# Patient Record
Sex: FEMALE | Race: WHITE | NOT HISPANIC OR LATINO | Employment: OTHER | URBAN - METROPOLITAN AREA
[De-identification: names, ages, dates, MRNs, and addresses within clinical notes are randomized per-mention and may not be internally consistent; named-entity substitution may affect disease eponyms.]

---

## 2023-06-20 ENCOUNTER — TRANSFERRED RECORDS (OUTPATIENT)
Dept: HEALTH INFORMATION MANAGEMENT | Facility: CLINIC | Age: 58
End: 2023-06-20

## 2023-06-22 ENCOUNTER — APPOINTMENT (OUTPATIENT)
Dept: GENERAL RADIOLOGY | Facility: CLINIC | Age: 58
DRG: 856 | End: 2023-06-22
Attending: EMERGENCY MEDICINE
Payer: COMMERCIAL

## 2023-06-22 ENCOUNTER — APPOINTMENT (OUTPATIENT)
Dept: CT IMAGING | Facility: CLINIC | Age: 58
DRG: 856 | End: 2023-06-22
Attending: EMERGENCY MEDICINE
Payer: COMMERCIAL

## 2023-06-22 ENCOUNTER — HOSPITAL ENCOUNTER (INPATIENT)
Facility: CLINIC | Age: 58
LOS: 9 days | Discharge: HOME | DRG: 856 | End: 2023-07-01
Attending: EMERGENCY MEDICINE | Admitting: INTERNAL MEDICINE
Payer: COMMERCIAL

## 2023-06-22 DIAGNOSIS — L97.522 FOOT ULCER, LEFT, WITH FAT LAYER EXPOSED (H): Primary | ICD-10-CM

## 2023-06-22 DIAGNOSIS — L03.211 FACIAL CELLULITIS: ICD-10-CM

## 2023-06-22 DIAGNOSIS — L03.032 CELLULITIS OF LEFT TOE: ICD-10-CM

## 2023-06-22 LAB
ALBUMIN SERPL BCG-MCNC: 4.2 G/DL (ref 3.5–5.2)
ALP SERPL-CCNC: 102 U/L (ref 35–104)
ALT SERPL W P-5'-P-CCNC: 5 U/L (ref 0–50)
ANION GAP SERPL CALCULATED.3IONS-SCNC: 15 MMOL/L (ref 7–15)
AST SERPL W P-5'-P-CCNC: 16 U/L (ref 0–45)
BASOPHILS # BLD AUTO: 0 10E3/UL (ref 0–0.2)
BASOPHILS NFR BLD AUTO: 0 %
BILIRUB SERPL-MCNC: 0.5 MG/DL
BUN SERPL-MCNC: 9 MG/DL (ref 6–20)
CALCIUM SERPL-MCNC: 9.5 MG/DL (ref 8.6–10)
CHLORIDE SERPL-SCNC: 100 MMOL/L (ref 98–107)
CREAT SERPL-MCNC: 0.53 MG/DL (ref 0.51–0.95)
DEPRECATED HCO3 PLAS-SCNC: 21 MMOL/L (ref 22–29)
EOSINOPHIL # BLD AUTO: 0 10E3/UL (ref 0–0.7)
EOSINOPHIL NFR BLD AUTO: 0 %
ERYTHROCYTE [DISTWIDTH] IN BLOOD BY AUTOMATED COUNT: 12.1 % (ref 10–15)
GFR SERPL CREATININE-BSD FRML MDRD: >90 ML/MIN/1.73M2
GLUCOSE BLDC GLUCOMTR-MCNC: 201 MG/DL (ref 70–99)
GLUCOSE SERPL-MCNC: 222 MG/DL (ref 70–99)
HBA1C MFR BLD: 6.8 %
HCT VFR BLD AUTO: 43.1 % (ref 35–47)
HGB BLD-MCNC: 14.6 G/DL (ref 11.7–15.7)
IMM GRANULOCYTES # BLD: 0.1 10E3/UL
IMM GRANULOCYTES NFR BLD: 1 %
LACTATE SERPL-SCNC: 1.4 MMOL/L (ref 0.7–2)
LYMPHOCYTES # BLD AUTO: 1.4 10E3/UL (ref 0.8–5.3)
LYMPHOCYTES NFR BLD AUTO: 11 %
MCH RBC QN AUTO: 31.3 PG (ref 26.5–33)
MCHC RBC AUTO-ENTMCNC: 33.9 G/DL (ref 31.5–36.5)
MCV RBC AUTO: 93 FL (ref 78–100)
MONOCYTES # BLD AUTO: 0.8 10E3/UL (ref 0–1.3)
MONOCYTES NFR BLD AUTO: 6 %
NEUTROPHILS # BLD AUTO: 10 10E3/UL (ref 1.6–8.3)
NEUTROPHILS NFR BLD AUTO: 82 %
NRBC # BLD AUTO: 0 10E3/UL
NRBC BLD AUTO-RTO: 0 /100
PLATELET # BLD AUTO: 239 10E3/UL (ref 150–450)
POTASSIUM SERPL-SCNC: 4 MMOL/L (ref 3.4–5.3)
PROT SERPL-MCNC: 7.8 G/DL (ref 6.4–8.3)
RBC # BLD AUTO: 4.66 10E6/UL (ref 3.8–5.2)
SODIUM SERPL-SCNC: 136 MMOL/L (ref 136–145)
WBC # BLD AUTO: 12.2 10E3/UL (ref 4–11)

## 2023-06-22 PROCEDURE — 70487 CT MAXILLOFACIAL W/DYE: CPT

## 2023-06-22 PROCEDURE — 93005 ELECTROCARDIOGRAM TRACING: CPT

## 2023-06-22 PROCEDURE — 999N000127 HC STATISTIC PERIPHERAL IV START W US GUIDANCE

## 2023-06-22 PROCEDURE — 250N000013 HC RX MED GY IP 250 OP 250 PS 637: Performed by: INTERNAL MEDICINE

## 2023-06-22 PROCEDURE — 82010 KETONE BODYS QUAN: CPT | Performed by: INTERNAL MEDICINE

## 2023-06-22 PROCEDURE — 250N000011 HC RX IP 250 OP 636: Performed by: EMERGENCY MEDICINE

## 2023-06-22 PROCEDURE — 80053 COMPREHEN METABOLIC PANEL: CPT | Performed by: EMERGENCY MEDICINE

## 2023-06-22 PROCEDURE — 99223 1ST HOSP IP/OBS HIGH 75: CPT | Performed by: INTERNAL MEDICINE

## 2023-06-22 PROCEDURE — 250N000012 HC RX MED GY IP 250 OP 636 PS 637: Performed by: INTERNAL MEDICINE

## 2023-06-22 PROCEDURE — 83036 HEMOGLOBIN GLYCOSYLATED A1C: CPT | Performed by: INTERNAL MEDICINE

## 2023-06-22 PROCEDURE — 73630 X-RAY EXAM OF FOOT: CPT | Mod: LT

## 2023-06-22 PROCEDURE — 99418 PROLNG IP/OBS E/M EA 15 MIN: CPT | Performed by: INTERNAL MEDICINE

## 2023-06-22 PROCEDURE — 82962 GLUCOSE BLOOD TEST: CPT

## 2023-06-22 PROCEDURE — 250N000011 HC RX IP 250 OP 636: Performed by: STUDENT IN AN ORGANIZED HEALTH CARE EDUCATION/TRAINING PROGRAM

## 2023-06-22 PROCEDURE — 258N000003 HC RX IP 258 OP 636: Performed by: EMERGENCY MEDICINE

## 2023-06-22 PROCEDURE — 99285 EMERGENCY DEPT VISIT HI MDM: CPT | Mod: 25

## 2023-06-22 PROCEDURE — 70487 CT MAXILLOFACIAL W/DYE: CPT | Mod: 26 | Performed by: RADIOLOGY

## 2023-06-22 PROCEDURE — 73630 X-RAY EXAM OF FOOT: CPT | Mod: 26 | Performed by: RADIOLOGY

## 2023-06-22 PROCEDURE — 85014 HEMATOCRIT: CPT | Performed by: EMERGENCY MEDICINE

## 2023-06-22 PROCEDURE — 96374 THER/PROPH/DIAG INJ IV PUSH: CPT

## 2023-06-22 PROCEDURE — 83605 ASSAY OF LACTIC ACID: CPT | Performed by: EMERGENCY MEDICINE

## 2023-06-22 PROCEDURE — 99285 EMERGENCY DEPT VISIT HI MDM: CPT | Performed by: EMERGENCY MEDICINE

## 2023-06-22 PROCEDURE — 120N000002 HC R&B MED SURG/OB UMMC

## 2023-06-22 PROCEDURE — 36415 COLL VENOUS BLD VENIPUNCTURE: CPT | Performed by: EMERGENCY MEDICINE

## 2023-06-22 RX ORDER — HYDROMORPHONE HYDROCHLORIDE 1 MG/ML
.3-.5 INJECTION, SOLUTION INTRAMUSCULAR; INTRAVENOUS; SUBCUTANEOUS
Status: DISCONTINUED | OUTPATIENT
Start: 2023-06-22 | End: 2023-07-01 | Stop reason: HOSPADM

## 2023-06-22 RX ORDER — METRONIDAZOLE 500 MG/100ML
500 INJECTION, SOLUTION INTRAVENOUS ONCE
Status: COMPLETED | OUTPATIENT
Start: 2023-06-22 | End: 2023-06-22

## 2023-06-22 RX ORDER — IOPAMIDOL 755 MG/ML
75 INJECTION, SOLUTION INTRAVASCULAR ONCE
Status: COMPLETED | OUTPATIENT
Start: 2023-06-22 | End: 2023-06-22

## 2023-06-22 RX ORDER — HYDROMORPHONE HYDROCHLORIDE 1 MG/ML
0.5 INJECTION, SOLUTION INTRAMUSCULAR; INTRAVENOUS; SUBCUTANEOUS
Status: DISCONTINUED | OUTPATIENT
Start: 2023-06-22 | End: 2023-06-22

## 2023-06-22 RX ORDER — LIDOCAINE 40 MG/G
CREAM TOPICAL
Status: DISCONTINUED | OUTPATIENT
Start: 2023-06-22 | End: 2023-07-01 | Stop reason: HOSPADM

## 2023-06-22 RX ORDER — LORAZEPAM 2 MG/ML
0.5 INJECTION INTRAMUSCULAR ONCE
Status: COMPLETED | OUTPATIENT
Start: 2023-06-22 | End: 2023-06-22

## 2023-06-22 RX ORDER — NICOTINE POLACRILEX 4 MG
15-30 LOZENGE BUCCAL
Status: DISCONTINUED | OUTPATIENT
Start: 2023-06-22 | End: 2023-06-23

## 2023-06-22 RX ORDER — LEVOFLOXACIN 5 MG/ML
750 INJECTION, SOLUTION INTRAVENOUS EVERY 24 HOURS
Status: DISCONTINUED | OUTPATIENT
Start: 2023-06-23 | End: 2023-07-01 | Stop reason: HOSPADM

## 2023-06-22 RX ORDER — PROCHLORPERAZINE 25 MG
25 SUPPOSITORY, RECTAL RECTAL EVERY 12 HOURS PRN
Status: DISCONTINUED | OUTPATIENT
Start: 2023-06-22 | End: 2023-07-01 | Stop reason: HOSPADM

## 2023-06-22 RX ORDER — ONDANSETRON 2 MG/ML
4 INJECTION INTRAMUSCULAR; INTRAVENOUS EVERY 6 HOURS PRN
Status: DISCONTINUED | OUTPATIENT
Start: 2023-06-22 | End: 2023-07-01 | Stop reason: HOSPADM

## 2023-06-22 RX ORDER — ACETAMINOPHEN 325 MG/1
975 TABLET ORAL EVERY 8 HOURS
Status: DISCONTINUED | OUTPATIENT
Start: 2023-06-22 | End: 2023-07-01 | Stop reason: HOSPADM

## 2023-06-22 RX ORDER — ONDANSETRON 4 MG/1
4 TABLET, ORALLY DISINTEGRATING ORAL EVERY 6 HOURS PRN
Status: DISCONTINUED | OUTPATIENT
Start: 2023-06-22 | End: 2023-07-01 | Stop reason: HOSPADM

## 2023-06-22 RX ORDER — LEVOFLOXACIN 5 MG/ML
750 INJECTION, SOLUTION INTRAVENOUS ONCE
Status: COMPLETED | OUTPATIENT
Start: 2023-06-22 | End: 2023-06-22

## 2023-06-22 RX ORDER — PROCHLORPERAZINE MALEATE 5 MG
10 TABLET ORAL EVERY 6 HOURS PRN
Status: DISCONTINUED | OUTPATIENT
Start: 2023-06-22 | End: 2023-07-01 | Stop reason: HOSPADM

## 2023-06-22 RX ORDER — DEXTROSE MONOHYDRATE 25 G/50ML
25-50 INJECTION, SOLUTION INTRAVENOUS
Status: DISCONTINUED | OUTPATIENT
Start: 2023-06-22 | End: 2023-06-23

## 2023-06-22 RX ORDER — FAMOTIDINE 20 MG/1
20 TABLET, FILM COATED ORAL 2 TIMES DAILY PRN
Status: DISCONTINUED | OUTPATIENT
Start: 2023-06-22 | End: 2023-07-01 | Stop reason: HOSPADM

## 2023-06-22 RX ORDER — METRONIDAZOLE 500 MG/100ML
500 INJECTION, SOLUTION INTRAVENOUS EVERY 12 HOURS
Status: DISCONTINUED | OUTPATIENT
Start: 2023-06-23 | End: 2023-07-01 | Stop reason: HOSPADM

## 2023-06-22 RX ADMIN — IOPAMIDOL 75 ML: 755 INJECTION, SOLUTION INTRAVENOUS at 18:35

## 2023-06-22 RX ADMIN — SODIUM CHLORIDE 500 ML: 9 INJECTION, SOLUTION INTRAVENOUS at 17:12

## 2023-06-22 RX ADMIN — LEVOFLOXACIN 750 MG: 5 INJECTION, SOLUTION INTRAVENOUS at 19:39

## 2023-06-22 RX ADMIN — METRONIDAZOLE 500 MG: 5 INJECTION, SOLUTION INTRAVENOUS at 17:42

## 2023-06-22 RX ADMIN — INSULIN GLARGINE 10 UNITS: 100 INJECTION, SOLUTION SUBCUTANEOUS at 21:33

## 2023-06-22 RX ADMIN — OXYCODONE HYDROCHLORIDE 2.5 MG: 5 TABLET ORAL at 21:30

## 2023-06-22 RX ADMIN — HYDROMORPHONE HYDROCHLORIDE 0.5 MG: 1 INJECTION, SOLUTION INTRAMUSCULAR; INTRAVENOUS; SUBCUTANEOUS at 17:12

## 2023-06-22 ASSESSMENT — ACTIVITIES OF DAILY LIVING (ADL)
ADLS_ACUITY_SCORE: 35
ADLS_ACUITY_SCORE: 35
ADLS_ACUITY_SCORE: 37
ADLS_ACUITY_SCORE: 35

## 2023-06-22 NOTE — CONSULTS
".  [facility] DENTAL   CONSULT  Glenny Campos,  MRN: [id number],  : 1965      ASSESSMENT   57 year old female presents to the Emergency Department for evaluation of right-sided upper facial swelling.        PLAN  1. Unasyn 3g q6h, 0.12% chlorhexidine twice daily, pain control per primary   2. Admit to medicine, [facility] on consult (Dr. Jose Enrique Granados)  3. Patient to be rounded on by on call resident.   4.  Possible I&D to be placed on 23 if site is drainable. Patient is vehemently against doing procedure under local. NO2 or General Anesthesia is her preferable choice.   5. HOB >30  6. Patients endodontist Dr. Soler believes the tooth can be restored. Will follow back up with endodontist once released from ED.     Please contact the [facility] resident on-call with questions or concerns.    [place] with staff Dr. Granados, at 2040 hrs.     Juany Gutierrez, ASHLY  [facility], PGY-1  ____________________________________________    HPI  57 year old female who had a history of right third tooth root canal years ago.  She again had an infection in this and saw a Dr. Soler at [facility] for a retreat of #3 mesial buccal root, and distal buccal root on 23. Dr Soler advised patient to take antibiotics after said procedure, but the patient refused and instead took essential oils for palliative treatment.  Since that time she has had increasing pain and swelling.  She has no fever but does have some chills.  She has nausea without vomiting.  She reports taking some of her pain medication seem to upset her stomach.  She also has complaints of a left third toe infection and has previously had amputation of her great toe.  She has no trauma.  She tells me that she will need to have an anesthesiologist place her IV and if she needs a procedure she will need to be \"knocked out.\"     HISTORY  Past Medical History: History reviewed. No pertinent past medical history.  Past Surgical History: History reviewed. No " "pertinent surgical history.  Medications:   No current facility-administered medications on file prior to encounter.  No current outpatient medications on file prior to encounter.       levofloxacin  750 mg Intravenous Once     metroNIDAZOLE  500 mg Intravenous Once     Allergies:   Allergies   Allergen Reactions     Cephalosporins      Other reaction(s): *Unknown     Piperacillin-Tazobactam In Dex Hives     Statins Muscle Pain (Myalgia)     Cephalexin Rash     Clindamycin Rash     possible rash, was on zosyn at the same time       Penicillins Hives and Rash     Social History:   Social History     Socioeconomic History     Marital status:      Spouse name: Not on file     Number of children: Not on file     Years of education: Not on file     Highest education level: Not on file   Occupational History     Not on file   Tobacco Use     Smoking status: Not on file     Smokeless tobacco: Not on file   Substance and Sexual Activity     Alcohol use: Not on file     Drug use: Not on file     Sexual activity: Not on file   Other Topics Concern     Not on file   Social History Narrative     Not on file     Social Determinants of Health     Financial Resource Strain: Not on file   Food Insecurity: Not on file   Transportation Needs: Not on file   Physical Activity: Not on file   Stress: Not on file   Social Connections: Not on file   Intimate Partner Violence: Not on file   Housing Stability: Not on file       REVIEW OF SYSTEMS  10 point ROS reviewed and negative aside from listed in HPI    PHYSICAL EXAM  Vital Signs:   Vitals:    06/22/23 1545 06/22/23 1736 06/22/23 1800   BP: (!) 144/81 (!) 148/71    Pulse: 94 87 90   Resp: 18     Temp: 99.9  F (37.7  C) 99.2  F (37.3  C)    TempSrc: Oral     SpO2: 98%  97%   Weight: 131.5 kg (290 lb)     Height: 1.778 m (5' 10\")         GEN: WD/WN female, NAD  HEENT: NC/AT, EOMI, PERRL, moderate swelling of the right mandibular maxillary region, right eye was puffy and indurated " along with the right maxillary upper region. No obvious submandibular lymphadenopathy, no abnormal skin lesions, inferior border of mandible is palpable bilaterally, CAROL 27 mm. Unable to view the intraoral cavity due to the patient stating she was in too much pain.   CV: RRR, no M/G/R, warm and well-perfused  PULM: CTAB, breathing comfortably on room air  GI: Soft, ND/NT  MSK: SHUKLA, no peripheral extremity edema  NEURO: AAOx4, CN II-XII intact bilaterally  PSYCH: Appropriate mood and affect               REVIEW OF LABORATORY DATA  Most Recent 3 CBC's:  Recent Labs   Lab Test 06/22/23  1640   WBC 12.2*   HGB 14.6   MCV 93         Most Recent 3 BMP's:  Recent Labs   Lab Test 06/22/23  1640      POTASSIUM 4.0   CHLORIDE 100   CO2 21*   BUN 9.0   CR 0.53   ANIONGAP 15   JOSE CARLOS 9.5   *     Most Recent 2 LFT's:  Recent Labs   Lab Test 06/22/23  1640   AST 16   ALT 5   ALKPHOS 102   BILITOTAL 0.5     Most Recent INR's and Anticoagulation Dosing History:  Anticoagulation Dose History         No data to display              Most Recent 3 Troponin's:No lab results found.  Most Recent Cholesterol Panel:No lab results found.  Most Recent 6 Bacteria Isolates From Any Culture (See EPIC Reports for Culture Details):No lab results found.  Most Recent TSH, T4 and A1c Labs:No lab results found.    IMAGING RESULTS (Include outside hospital results)     Independently reviewed   1. There is substantial inflammatory change about the right maxillary  and mandibular subcutaneous soft tissues without discrete fluid  collection following recent dental procedure to the right maxillary  third molar. Small adjacent foci of likely postsurgical gas.  2. Reactive bilateral lymph nodes, noted above.  3. Right maxillary sinusitis without definite dentigerous cyst or  odontogenic origin.

## 2023-06-22 NOTE — ED PROVIDER NOTES
"ED Provider Note  [facility]      History     Chief Complaint   Patient presents with     Post-op Problem     Facial Swelling     HPI  Glenny Campos is a 57 year old female who had a history of right third tooth root canal years ago.  She again had an infection in this region and underwent a dental procedure 2 days ago.  Since that time she has had increasing pain and swelling.  She has no fever but does have some chills.  She has nausea without vomiting.  She reports taking some of her pain medication seem to upset her stomach.  She also has complaints of a left third toe infection and has previously had amputation of her great toe.  She has no trauma.  She tells me that she will need to have an anesthesiologist place her IV and if she needs a procedure she will need to be \"knocked out.\"    Past Medical History  Past Medical History:   Diagnosis Date     Type 2 diabetes mellitus with diabetic polyneuropathy (H)      History reviewed. No pertinent surgical history.  No current outpatient medications on file.    Allergies   Allergen Reactions     Cephalosporins      Other reaction(s): *Unknown     Piperacillin-Tazobactam In Dex Hives     Statins Muscle Pain (Myalgia)     Cephalexin Rash     Clindamycin Rash     possible rash, was on zosyn at the same time       Penicillins Hives and Rash     Family History  History reviewed. No pertinent family history.  Social History          A medically appropriate review of systems was performed with pertinent positives and negatives noted in the HPI, and all other systems negative.    Physical Exam   BP: (!) 144/81  Pulse: 94  Temp: 99.9  F (37.7  C)  Resp: 18  Height: 177.8 cm (5' 10\")  Weight: 131.5 kg (290 lb)  SpO2: 98 %  Physical Exam  Constitutional:       General: She is in acute distress.      Appearance: She is well-developed.   HENT:      Head: Normocephalic and atraumatic.     Eyes:      Conjunctiva/sclera: Conjunctivae normal.      " Pupils: Pupils are equal, round, and reactive to light.   Neck:      Thyroid: No thyromegaly.      Trachea: No tracheal deviation.   Cardiovascular:      Rate and Rhythm: Normal rate and regular rhythm.      Heart sounds: Normal heart sounds. No murmur heard.  Pulmonary:      Effort: Pulmonary effort is normal. No respiratory distress.      Breath sounds: Normal breath sounds. No wheezing.   Chest:      Chest wall: No tenderness.   Abdominal:      General: There is no distension.      Palpations: Abdomen is soft.      Tenderness: There is no abdominal tenderness.   Musculoskeletal:         General: No tenderness.      Cervical back: Normal range of motion and neck supple.        Feet:    Skin:     General: Skin is warm.      Findings: No rash.   Neurological:      Mental Status: She is alert and oriented to person, place, and time.      Sensory: No sensory deficit.   Psychiatric:         Behavior: Behavior normal.         ED Course, Procedures, & Data      Procedures             Results for orders placed or performed during the hospital encounter of 06/22/23   CT Facial Bones with Contrast     Status: None    Narrative    CT FACIAL BONES WITH CONTRAST 6/22/2023 6:48 PM    History:  root canal 6/20.  Facial swelling    Comparison:  None      Technique: Using thin collimation multidetector helical acquisition  technique, axial and coronal thin section CT images were reconstructed  through the facial bones. Images were reviewed in bone and soft tissue  windows.    Findings:    Reported dental procedure involving the third maxillary molar. The  third molar and lateral margins of the maxillary sinus without  evidence of dentigerous cyst, however, there is at least moderate  right maxillary sinus mucosal debris/thickening, with a milder degree  within the left maxillary sinus and scattered paranasal sinus mucosal  thickening. There is substantial right subcutaneous facial soft tissue  inflammatory change. Questionable  small fluid collection along the  right hemimandible, image 21 of series 2. There is trace air foci near  the postsurgical region (series 2 image 19-20), also with somewhat  streaky hypodensities within the inflammatory change without discrete  fluid collection. There is somewhat limited evaluation secondary to  dental hardware streak artifact. Bilateral nonenlarged reactive  parotid, some and fibular, and submental lymph nodes, as well as  bilateral upper jugular lymph node seen.     No evident fracture of the facial bones. The cribriform plate appears  intact. Alignment of the facial bones appears normal.  The orbits are  normal. Mastoid air cells are unremarkable. Visualized portions of the  brain parenchyma demonstrate no definite abnormality.       Impression    Impression:  1. Extensive inflammatory change about the right maxillary and  mandibular subcutaneous soft tissues. Questionable small fluid  collection along the right hemimandible.  2. Reactive bilateral lymph nodes, noted above.  3. Right maxillary sinusitis without definite dentigerous cyst or  odontogenic origin.    I have personally reviewed the examination and initial interpretation  and I agree with the findings.    JUDY TAN MD         SYSTEM ID:  [id number]   Comprehensive metabolic panel     Status: Abnormal   Result Value Ref Range    Sodium 136 136 - 145 mmol/L    Potassium 4.0 3.4 - 5.3 mmol/L    Chloride 100 98 - 107 mmol/L    Carbon Dioxide (CO2) 21 (L) 22 - 29 mmol/L    Anion Gap 15 7 - 15 mmol/L    Urea Nitrogen 9.0 6.0 - 20.0 mg/dL    Creatinine 0.53 0.51 - 0.95 mg/dL    Calcium 9.5 8.6 - 10.0 mg/dL    Glucose 222 (H) 70 - 99 mg/dL    Alkaline Phosphatase 102 35 - 104 U/L    AST 16 0 - 45 U/L    ALT 5 0 - 50 U/L    Protein Total 7.8 6.4 - 8.3 g/dL    Albumin 4.2 3.5 - 5.2 g/dL    Bilirubin Total 0.5 <=1.2 mg/dL    GFR Estimate >90 >60 mL/min/1.73m2   Lactic acid whole blood     Status: Normal   Result Value Ref Range    Lactic Acid  1.4 0.7 - 2.0 mmol/L   CBC with platelets and differential     Status: Abnormal   Result Value Ref Range    WBC Count 12.2 (H) 4.0 - 11.0 10e3/uL    RBC Count 4.66 3.80 - 5.20 10e6/uL    Hemoglobin 14.6 11.7 - 15.7 g/dL    Hematocrit 43.1 35.0 - 47.0 %    MCV 93 78 - 100 fL    MCH 31.3 26.5 - 33.0 pg    MCHC 33.9 31.5 - 36.5 g/dL    RDW 12.1 10.0 - 15.0 %    Platelet Count 239 150 - 450 10e3/uL    % Neutrophils 82 %    % Lymphocytes 11 %    % Monocytes 6 %    % Eosinophils 0 %    % Basophils 0 %    % Immature Granulocytes 1 %    NRBCs per 100 WBC 0 <1 /100    Absolute Neutrophils 10.0 (H) 1.6 - 8.3 10e3/uL    Absolute Lymphocytes 1.4 0.8 - 5.3 10e3/uL    Absolute Monocytes 0.8 0.0 - 1.3 10e3/uL    Absolute Eosinophils 0.0 0.0 - 0.7 10e3/uL    Absolute Basophils 0.0 0.0 - 0.2 10e3/uL    Absolute Immature Granulocytes 0.1 <=0.4 10e3/uL    Absolute NRBCs 0.0 10e3/uL   Result Value Ref Range   EKG 12-lead, tracing only - DKA     Status: None   Result Value Ref Range    Systolic Blood Pressure  mmHg    Diastolic Blood Pressure  mmHg    Ventricular Rate 89 BPM    Atrial Rate 89 BPM    MI Interval 196 ms    QRS Duration 76 ms     ms    QTc 435 ms    P Axis 21 degrees    R AXIS 0 degrees    T Axis -12 degrees    Interpretation ECG       Sinus rhythm  Possible Inferior infarct , age undetermined  Abnormal ECG  Unconfirmed report - interpretation of this ECG is computer generated - see medical record for final interpretation  Confirmed by - EMERGENCY ROOM, PHYSICIAN ([id number]),  NIMISHA MELCHOR ([id number]) on 6/23/2023 1:06:22 PM                  Critical care was not performed.     Medical Decision Making  The patient's presentation was of high complexity (an acute health issue posing potential threat to life or bodily function).    The patient's evaluation involved:  ordering and/or review of 3+ test(s) in this encounter (see separate area of note for details)  review of 3+ test result(s) ordered prior to this  encounter (see separate area of note for details)  independent interpretation of testing performed by another health professional (see separate area of note for details)  discussion of management or test interpretation with another health professional (see separate area of note for details)    The patient's management necessitated high risk (a decision regarding hospitalization).      Assessment & Plan    Patient is a 57-year-old female who underwent a dental procedure for infection 2 days ago.  She previously had a root canal on that tooth with a crown placed.  Given having increasing pain and facial swelling since time of the procedure.  She has no fever but does report some chills.  She has tried using essential oils without relief.  She did not initially want to have oral antibiotics.  Patient also has complaints for left third toe infection.    On exam, patient pulse rate is 94 with a temperature of 99.9.  Respirations are 18 with oxygen saturations of 98%.    Patient has right-sided facial swelling over her maxilla, right sinus, and around her right eye which impacts really open the right eye.  It is red and and indurated.  It is exquisitely tender in the maxillary region.  There is no evidence of airway compromise.    Her left foot reveals a shallow ulceration between her second and third toes.  There is mild swelling.    IV was established and bloods were drawn.    Patient has multiple antibiotic allergies, and treatment plan was discussed with the ER pharmacist for appropriate antibiotics.  We decided upon Levaquin and Flagyl.  I also discussed the case with history who recommended a facial CT.    Patient's white count returned at 12.2 with a hemoglobin of 14.6.  Last white count on 3/5/2023 was 5.2.    Patient's comprehensive metabolic profile was remarkable for a glucose of 222 and bicarb of 21.  3 months ago, her bicarb was 20 with a glucose of 140.    Lactate level returned at 1.4.    Patient was given  IV fluids.    Patient had a foot x-ray obtained.  I read independently.  It showed amputation of the great toe with partial amputation of the tip of the second toe.  There was some cortical disruption of the third toe without obvious osteomyelitis.  Patient is on antibiotics which should cover a foot infection as well.    Facial CT showed extensive inflammatory change about the right maxillary and mandibular subcutaneous soft tissue with a questional small fluid collection.    Case again discussed with dentistry.  Patient be admitted to medicine service with dentistry consultation.  She will continue on antibiotics.  She may need a drainage procedure if does not improve with antibiotics.        I have reviewed the nursing notes. I have reviewed the findings, diagnosis, plan and need for follow up with the patient.    New Prescriptions    No medications on file       Final diagnoses:   Facial cellulitis   Cellulitis of left toe       Wilmar Golden MD  [facility] EMERGENCY DEPARTMENT  6/22/2023     Wilmar Golden MD  06/24/23 1031

## 2023-06-22 NOTE — ED TRIAGE NOTES
Patient presents to ED with faical swelling after oral surgery on Tuesday. Pain 9/10.      Triage Assessment     Row Name 06/22/23 1350       Triage Assessment (Adult)    Airway WDL X  facial swelling       Respiratory WDL    Respiratory WDL WDL       Skin Circulation/Temperature WDL    Skin Circulation/Temperature WDL WDL       Cardiac WDL    Cardiac WDL WDL       Peripheral/Neurovascular WDL    Peripheral Neurovascular WDL WDL       Cognitive/Neuro/Behavioral WDL    Cognitive/Neuro/Behavioral WDL WDL

## 2023-06-23 LAB
ALBUMIN SERPL BCG-MCNC: 4 G/DL (ref 3.5–5.2)
ALBUMIN UR-MCNC: 70 MG/DL
ALP SERPL-CCNC: 107 U/L (ref 35–104)
ALT SERPL W P-5'-P-CCNC: 12 U/L (ref 0–50)
ANION GAP SERPL CALCULATED.3IONS-SCNC: 10 MMOL/L (ref 7–15)
ANION GAP SERPL CALCULATED.3IONS-SCNC: 15 MMOL/L (ref 7–15)
ANION GAP SERPL CALCULATED.3IONS-SCNC: 19 MMOL/L (ref 7–15)
ANION GAP SERPL CALCULATED.3IONS-SCNC: 20 MMOL/L (ref 7–15)
APPEARANCE UR: CLEAR
AST SERPL W P-5'-P-CCNC: 18 U/L (ref 0–45)
ATRIAL RATE - MUSE: 89 BPM
B-OH-BUTYR SERPL-SCNC: 1.9 MMOL/L
B-OH-BUTYR SERPL-SCNC: 2.5 MMOL/L
B-OH-BUTYR SERPL-SCNC: 4 MMOL/L
B-OH-BUTYR SERPL-SCNC: 4.4 MMOL/L
B-OH-BUTYR SERPL-SCNC: 4.4 MMOL/L
BACTERIA #/AREA URNS HPF: ABNORMAL /HPF
BASE EXCESS BLDV CALC-SCNC: -2.3 MMOL/L (ref -7.7–1.9)
BASE EXCESS BLDV CALC-SCNC: -4.9 MMOL/L (ref -7.7–1.9)
BASOPHILS # BLD AUTO: 0 10E3/UL (ref 0–0.2)
BASOPHILS NFR BLD AUTO: 0 %
BILIRUB DIRECT SERPL-MCNC: <0.2 MG/DL (ref 0–0.3)
BILIRUB SERPL-MCNC: 0.4 MG/DL
BILIRUB UR QL STRIP: NEGATIVE
BUN SERPL-MCNC: 7.4 MG/DL (ref 6–20)
BUN SERPL-MCNC: 7.7 MG/DL (ref 6–20)
BUN SERPL-MCNC: 7.8 MG/DL (ref 6–20)
BUN SERPL-MCNC: 7.9 MG/DL (ref 6–20)
CALCIUM SERPL-MCNC: 8.7 MG/DL (ref 8.6–10)
CALCIUM SERPL-MCNC: 9.1 MG/DL (ref 8.6–10)
CALCIUM SERPL-MCNC: 9.2 MG/DL (ref 8.6–10)
CALCIUM SERPL-MCNC: 9.3 MG/DL (ref 8.6–10)
CHLORIDE SERPL-SCNC: 100 MMOL/L (ref 98–107)
CHLORIDE SERPL-SCNC: 102 MMOL/L (ref 98–107)
CHLORIDE SERPL-SCNC: 105 MMOL/L (ref 98–107)
CHLORIDE SERPL-SCNC: 99 MMOL/L (ref 98–107)
COLOR UR AUTO: ABNORMAL
CREAT SERPL-MCNC: 0.5 MG/DL (ref 0.51–0.95)
CREAT SERPL-MCNC: 0.51 MG/DL (ref 0.51–0.95)
CREAT SERPL-MCNC: 0.51 MG/DL (ref 0.51–0.95)
CREAT SERPL-MCNC: 0.57 MG/DL (ref 0.51–0.95)
CRP SERPL-MCNC: 316 MG/L
DEPRECATED HCO3 PLAS-SCNC: 15 MMOL/L (ref 22–29)
DEPRECATED HCO3 PLAS-SCNC: 17 MMOL/L (ref 22–29)
DEPRECATED HCO3 PLAS-SCNC: 20 MMOL/L (ref 22–29)
DEPRECATED HCO3 PLAS-SCNC: 23 MMOL/L (ref 22–29)
DIASTOLIC BLOOD PRESSURE - MUSE: NORMAL MMHG
EOSINOPHIL # BLD AUTO: 0 10E3/UL (ref 0–0.7)
EOSINOPHIL NFR BLD AUTO: 0 %
ERYTHROCYTE [DISTWIDTH] IN BLOOD BY AUTOMATED COUNT: 12.4 % (ref 10–15)
ERYTHROCYTE [SEDIMENTATION RATE] IN BLOOD BY WESTERGREN METHOD: 40 MM/HR (ref 0–30)
GFR SERPL CREATININE-BSD FRML MDRD: >90 ML/MIN/1.73M2
GLUCOSE BLDC GLUCOMTR-MCNC: 162 MG/DL (ref 70–99)
GLUCOSE BLDC GLUCOMTR-MCNC: 167 MG/DL (ref 70–99)
GLUCOSE BLDC GLUCOMTR-MCNC: 168 MG/DL (ref 70–99)
GLUCOSE BLDC GLUCOMTR-MCNC: 197 MG/DL (ref 70–99)
GLUCOSE BLDC GLUCOMTR-MCNC: 205 MG/DL (ref 70–99)
GLUCOSE BLDC GLUCOMTR-MCNC: 206 MG/DL (ref 70–99)
GLUCOSE BLDC GLUCOMTR-MCNC: 207 MG/DL (ref 70–99)
GLUCOSE BLDC GLUCOMTR-MCNC: 208 MG/DL (ref 70–99)
GLUCOSE BLDC GLUCOMTR-MCNC: 212 MG/DL (ref 70–99)
GLUCOSE BLDC GLUCOMTR-MCNC: 260 MG/DL (ref 70–99)
GLUCOSE BLDC GLUCOMTR-MCNC: 280 MG/DL (ref 70–99)
GLUCOSE BLDC GLUCOMTR-MCNC: 300 MG/DL (ref 70–99)
GLUCOSE BLDC GLUCOMTR-MCNC: 303 MG/DL (ref 70–99)
GLUCOSE SERPL-MCNC: 178 MG/DL (ref 70–99)
GLUCOSE SERPL-MCNC: 230 MG/DL (ref 70–99)
GLUCOSE SERPL-MCNC: 300 MG/DL (ref 70–99)
GLUCOSE SERPL-MCNC: 320 MG/DL (ref 70–99)
GLUCOSE UR STRIP-MCNC: 500 MG/DL
HCO3 BLDV-SCNC: 20 MMOL/L (ref 21–28)
HCO3 BLDV-SCNC: 23 MMOL/L (ref 21–28)
HCT VFR BLD AUTO: 40.6 % (ref 35–47)
HGB BLD-MCNC: 13.8 G/DL (ref 11.7–15.7)
HGB UR QL STRIP: NEGATIVE
IMM GRANULOCYTES # BLD: 0.1 10E3/UL
IMM GRANULOCYTES NFR BLD: 1 %
INR PPP: 1.3 (ref 0.85–1.15)
INTERPRETATION ECG - MUSE: NORMAL
KETONES UR STRIP-MCNC: >150 MG/DL
LEUKOCYTE ESTERASE UR QL STRIP: ABNORMAL
LYMPHOCYTES # BLD AUTO: 0.7 10E3/UL (ref 0.8–5.3)
LYMPHOCYTES NFR BLD AUTO: 5 %
MAGNESIUM SERPL-MCNC: 3.1 MG/DL (ref 1.7–2.3)
MCH RBC QN AUTO: 31.2 PG (ref 26.5–33)
MCHC RBC AUTO-ENTMCNC: 34 G/DL (ref 31.5–36.5)
MCV RBC AUTO: 92 FL (ref 78–100)
MONOCYTES # BLD AUTO: 1 10E3/UL (ref 0–1.3)
MONOCYTES NFR BLD AUTO: 6 %
MUCOUS THREADS #/AREA URNS LPF: PRESENT /LPF
NEUTROPHILS # BLD AUTO: 13.3 10E3/UL (ref 1.6–8.3)
NEUTROPHILS NFR BLD AUTO: 88 %
NITRATE UR QL: NEGATIVE
NRBC # BLD AUTO: 0 10E3/UL
NRBC BLD AUTO-RTO: 0 /100
O2/TOTAL GAS SETTING VFR VENT: 0 %
O2/TOTAL GAS SETTING VFR VENT: 0 %
OSMOLALITY SERPL: 307 MMOL/KG (ref 275–295)
P AXIS - MUSE: 21 DEGREES
PCO2 BLDV: 34 MM HG (ref 40–50)
PCO2 BLDV: 42 MM HG (ref 40–50)
PH BLDV: 7.35 [PH] (ref 7.32–7.43)
PH BLDV: 7.37 [PH] (ref 7.32–7.43)
PH UR STRIP: 5.5 [PH] (ref 5–7)
PHOSPHATE SERPL-MCNC: 1.9 MG/DL (ref 2.5–4.5)
PHOSPHATE SERPL-MCNC: 2.3 MG/DL (ref 2.5–4.5)
PHOSPHATE SERPL-MCNC: 2.6 MG/DL (ref 2.5–4.5)
PHOSPHATE SERPL-MCNC: 3.2 MG/DL (ref 2.5–4.5)
PLATELET # BLD AUTO: 251 10E3/UL (ref 150–450)
PO2 BLDV: 28 MM HG (ref 25–47)
PO2 BLDV: 32 MM HG (ref 25–47)
POTASSIUM SERPL-SCNC: 3.6 MMOL/L (ref 3.4–5.3)
POTASSIUM SERPL-SCNC: 3.8 MMOL/L (ref 3.4–5.3)
POTASSIUM SERPL-SCNC: 4.2 MMOL/L (ref 3.4–5.3)
POTASSIUM SERPL-SCNC: 4.3 MMOL/L (ref 3.4–5.3)
PR INTERVAL - MUSE: 196 MS
PROT SERPL-MCNC: 7.7 G/DL (ref 6.4–8.3)
QRS DURATION - MUSE: 76 MS
QT - MUSE: 358 MS
QTC - MUSE: 435 MS
R AXIS - MUSE: 0 DEGREES
RBC # BLD AUTO: 4.43 10E6/UL (ref 3.8–5.2)
RBC URINE: 0 /HPF
SODIUM SERPL-SCNC: 134 MMOL/L (ref 136–145)
SODIUM SERPL-SCNC: 136 MMOL/L (ref 136–145)
SODIUM SERPL-SCNC: 137 MMOL/L (ref 136–145)
SODIUM SERPL-SCNC: 138 MMOL/L (ref 136–145)
SP GR UR STRIP: 1.03 (ref 1–1.03)
SQUAMOUS EPITHELIAL: 3 /HPF
SYSTOLIC BLOOD PRESSURE - MUSE: NORMAL MMHG
T AXIS - MUSE: -12 DEGREES
UROBILINOGEN UR STRIP-MCNC: NORMAL MG/DL
VENTRICULAR RATE- MUSE: 89 BPM
WBC # BLD AUTO: 15.1 10E3/UL (ref 4–11)
WBC URINE: 24 /HPF

## 2023-06-23 PROCEDURE — 82248 BILIRUBIN DIRECT: CPT | Performed by: INTERNAL MEDICINE

## 2023-06-23 PROCEDURE — 82010 KETONE BODYS QUAN: CPT | Performed by: PHYSICIAN ASSISTANT

## 2023-06-23 PROCEDURE — G0463 HOSPITAL OUTPT CLINIC VISIT: HCPCS

## 2023-06-23 PROCEDURE — 87086 URINE CULTURE/COLONY COUNT: CPT | Performed by: INTERNAL MEDICINE

## 2023-06-23 PROCEDURE — 82310 ASSAY OF CALCIUM: CPT | Performed by: INTERNAL MEDICINE

## 2023-06-23 PROCEDURE — 250N000011 HC RX IP 250 OP 636: Performed by: INTERNAL MEDICINE

## 2023-06-23 PROCEDURE — 81001 URINALYSIS AUTO W/SCOPE: CPT | Performed by: INTERNAL MEDICINE

## 2023-06-23 PROCEDURE — 250N000013 HC RX MED GY IP 250 OP 250 PS 637: Performed by: INTERNAL MEDICINE

## 2023-06-23 PROCEDURE — 36415 COLL VENOUS BLD VENIPUNCTURE: CPT

## 2023-06-23 PROCEDURE — 82962 GLUCOSE BLOOD TEST: CPT

## 2023-06-23 PROCEDURE — 86140 C-REACTIVE PROTEIN: CPT | Performed by: INTERNAL MEDICINE

## 2023-06-23 PROCEDURE — 258N000003 HC RX IP 258 OP 636: Performed by: INTERNAL MEDICINE

## 2023-06-23 PROCEDURE — 85610 PROTHROMBIN TIME: CPT | Performed by: INTERNAL MEDICINE

## 2023-06-23 PROCEDURE — 36415 COLL VENOUS BLD VENIPUNCTURE: CPT | Performed by: CLINICAL NURSE SPECIALIST

## 2023-06-23 PROCEDURE — 120N000002 HC R&B MED SURG/OB UMMC

## 2023-06-23 PROCEDURE — 999N000128 HC STATISTIC PERIPHERAL IV START W/O US GUIDANCE

## 2023-06-23 PROCEDURE — 99223 1ST HOSP IP/OBS HIGH 75: CPT | Performed by: PHYSICIAN ASSISTANT

## 2023-06-23 PROCEDURE — 83930 ASSAY OF BLOOD OSMOLALITY: CPT | Performed by: INTERNAL MEDICINE

## 2023-06-23 PROCEDURE — 82803 BLOOD GASES ANY COMBINATION: CPT | Performed by: INTERNAL MEDICINE

## 2023-06-23 PROCEDURE — 36415 COLL VENOUS BLD VENIPUNCTURE: CPT | Performed by: INTERNAL MEDICINE

## 2023-06-23 PROCEDURE — 82010 KETONE BODYS QUAN: CPT | Performed by: INTERNAL MEDICINE

## 2023-06-23 PROCEDURE — 83735 ASSAY OF MAGNESIUM: CPT | Performed by: INTERNAL MEDICINE

## 2023-06-23 PROCEDURE — 87077 CULTURE AEROBIC IDENTIFY: CPT

## 2023-06-23 PROCEDURE — 85652 RBC SED RATE AUTOMATED: CPT | Performed by: INTERNAL MEDICINE

## 2023-06-23 PROCEDURE — 87077 CULTURE AEROBIC IDENTIFY: CPT | Performed by: CLINICAL NURSE SPECIALIST

## 2023-06-23 PROCEDURE — 99233 SBSQ HOSP IP/OBS HIGH 50: CPT | Performed by: INTERNAL MEDICINE

## 2023-06-23 PROCEDURE — 250N000009 HC RX 250: Performed by: INTERNAL MEDICINE

## 2023-06-23 PROCEDURE — 250N000013 HC RX MED GY IP 250 OP 250 PS 637

## 2023-06-23 PROCEDURE — 250N000011 HC RX IP 250 OP 636: Mod: JZ | Performed by: INTERNAL MEDICINE

## 2023-06-23 PROCEDURE — 84100 ASSAY OF PHOSPHORUS: CPT | Performed by: INTERNAL MEDICINE

## 2023-06-23 PROCEDURE — 87149 DNA/RNA DIRECT PROBE: CPT

## 2023-06-23 PROCEDURE — 85025 COMPLETE CBC W/AUTO DIFF WBC: CPT | Performed by: INTERNAL MEDICINE

## 2023-06-23 RX ORDER — NALOXONE HYDROCHLORIDE 0.4 MG/ML
0.4 INJECTION, SOLUTION INTRAMUSCULAR; INTRAVENOUS; SUBCUTANEOUS
Status: DISCONTINUED | OUTPATIENT
Start: 2023-06-23 | End: 2023-07-01 | Stop reason: HOSPADM

## 2023-06-23 RX ORDER — HYDROMORPHONE HYDROCHLORIDE 1 MG/ML
0.5 INJECTION, SOLUTION INTRAMUSCULAR; INTRAVENOUS; SUBCUTANEOUS ONCE
Status: DISCONTINUED | OUTPATIENT
Start: 2023-06-24 | End: 2023-06-23

## 2023-06-23 RX ORDER — NALOXONE HYDROCHLORIDE 0.4 MG/ML
0.2 INJECTION, SOLUTION INTRAMUSCULAR; INTRAVENOUS; SUBCUTANEOUS
Status: DISCONTINUED | OUTPATIENT
Start: 2023-06-23 | End: 2023-07-01 | Stop reason: HOSPADM

## 2023-06-23 RX ORDER — NICOTINE POLACRILEX 4 MG
15-30 LOZENGE BUCCAL
Status: DISCONTINUED | OUTPATIENT
Start: 2023-06-23 | End: 2023-06-23

## 2023-06-23 RX ORDER — SODIUM CHLORIDE AND POTASSIUM CHLORIDE 150; 450 MG/100ML; MG/100ML
INJECTION, SOLUTION INTRAVENOUS CONTINUOUS
Status: DISCONTINUED | OUTPATIENT
Start: 2023-06-23 | End: 2023-06-23

## 2023-06-23 RX ORDER — DEXTROSE MONOHYDRATE, SODIUM CHLORIDE, AND POTASSIUM CHLORIDE 50; 1.49; 4.5 G/1000ML; G/1000ML; G/1000ML
INJECTION, SOLUTION INTRAVENOUS CONTINUOUS
Status: DISCONTINUED | OUTPATIENT
Start: 2023-06-23 | End: 2023-06-23

## 2023-06-23 RX ORDER — DEXTROSE MONOHYDRATE 25 G/50ML
25-50 INJECTION, SOLUTION INTRAVENOUS
Status: DISCONTINUED | OUTPATIENT
Start: 2023-06-23 | End: 2023-07-01 | Stop reason: HOSPADM

## 2023-06-23 RX ORDER — LIDOCAINE HYDROCHLORIDE 10 MG/ML
10 INJECTION, SOLUTION EPIDURAL; INFILTRATION; INTRACAUDAL; PERINEURAL ONCE
Status: DISCONTINUED | OUTPATIENT
Start: 2023-06-24 | End: 2023-07-01 | Stop reason: HOSPADM

## 2023-06-23 RX ORDER — DIPHENHYDRAMINE HYDROCHLORIDE 50 MG/ML
50 INJECTION INTRAMUSCULAR; INTRAVENOUS ONCE
Status: COMPLETED | OUTPATIENT
Start: 2023-06-24 | End: 2023-06-24

## 2023-06-23 RX ORDER — NICOTINE POLACRILEX 4 MG
15-30 LOZENGE BUCCAL
Status: DISCONTINUED | OUTPATIENT
Start: 2023-06-23 | End: 2023-07-01 | Stop reason: HOSPADM

## 2023-06-23 RX ORDER — DEXTROSE MONOHYDRATE 25 G/50ML
25-50 INJECTION, SOLUTION INTRAVENOUS
Status: DISCONTINUED | OUTPATIENT
Start: 2023-06-23 | End: 2023-06-23

## 2023-06-23 RX ORDER — POTASSIUM PHOS IN 0.9 % NACL 15MMOL/250
15 PLASTIC BAG, INJECTION (ML) INTRAVENOUS
Status: COMPLETED | OUTPATIENT
Start: 2023-06-23 | End: 2023-06-24

## 2023-06-23 RX ORDER — METOCLOPRAMIDE HYDROCHLORIDE 5 MG/ML
10 INJECTION INTRAMUSCULAR; INTRAVENOUS EVERY 6 HOURS PRN
Status: DISCONTINUED | OUTPATIENT
Start: 2023-06-23 | End: 2023-07-01 | Stop reason: HOSPADM

## 2023-06-23 RX ORDER — HYDROMORPHONE HYDROCHLORIDE 1 MG/ML
0.5 INJECTION, SOLUTION INTRAMUSCULAR; INTRAVENOUS; SUBCUTANEOUS ONCE
Status: COMPLETED | OUTPATIENT
Start: 2023-06-24 | End: 2023-06-24

## 2023-06-23 RX ORDER — DEXTROSE MONOHYDRATE 100 MG/ML
INJECTION, SOLUTION INTRAVENOUS CONTINUOUS PRN
Status: DISCONTINUED | OUTPATIENT
Start: 2023-06-23 | End: 2023-07-01 | Stop reason: HOSPADM

## 2023-06-23 RX ORDER — CHLORHEXIDINE GLUCONATE ORAL RINSE 1.2 MG/ML
15 SOLUTION DENTAL 2 TIMES DAILY
Status: DISCONTINUED | OUTPATIENT
Start: 2023-06-23 | End: 2023-07-01 | Stop reason: HOSPADM

## 2023-06-23 RX ORDER — DIPHENHYDRAMINE HYDROCHLORIDE 50 MG/ML
50 INJECTION INTRAMUSCULAR; INTRAVENOUS EVERY 6 HOURS PRN
Status: DISCONTINUED | OUTPATIENT
Start: 2023-06-24 | End: 2023-06-23

## 2023-06-23 RX ADMIN — CHLORHEXIDINE GLUCONATE 15 ML: 1.2 SOLUTION ORAL at 09:10

## 2023-06-23 RX ADMIN — POTASSIUM & SODIUM PHOSPHATES POWDER PACK 280-160-250 MG 1 PACKET: 280-160-250 PACK at 19:34

## 2023-06-23 RX ADMIN — HUMAN INSULIN 2 UNITS/HR: 100 INJECTION, SOLUTION SUBCUTANEOUS at 13:59

## 2023-06-23 RX ADMIN — HUMAN INSULIN 1.5 UNITS/HR: 100 INJECTION, SOLUTION SUBCUTANEOUS at 16:28

## 2023-06-23 RX ADMIN — SODIUM CHLORIDE 1000 ML: 9 INJECTION, SOLUTION INTRAVENOUS at 09:53

## 2023-06-23 RX ADMIN — INSULIN GLARGINE 10 UNITS: 100 INJECTION, SOLUTION SUBCUTANEOUS at 09:10

## 2023-06-23 RX ADMIN — HUMAN INSULIN 3 UNITS/HR: 100 INJECTION, SOLUTION SUBCUTANEOUS at 12:42

## 2023-06-23 RX ADMIN — ONDANSETRON 4 MG: 2 INJECTION INTRAMUSCULAR; INTRAVENOUS at 00:58

## 2023-06-23 RX ADMIN — METRONIDAZOLE 500 MG: 5 INJECTION, SOLUTION INTRAVENOUS at 18:48

## 2023-06-23 RX ADMIN — METRONIDAZOLE 500 MG: 5 INJECTION, SOLUTION INTRAVENOUS at 05:58

## 2023-06-23 RX ADMIN — ACETAMINOPHEN 975 MG: 325 TABLET, FILM COATED ORAL at 09:52

## 2023-06-23 RX ADMIN — CHLORHEXIDINE GLUCONATE 15 ML: 1.2 SOLUTION ORAL at 21:56

## 2023-06-23 RX ADMIN — LEVOFLOXACIN 750 MG: 5 INJECTION, SOLUTION INTRAVENOUS at 16:37

## 2023-06-23 RX ADMIN — POTASSIUM CHLORIDE AND SODIUM CHLORIDE: 450; 150 INJECTION, SOLUTION INTRAVENOUS at 10:59

## 2023-06-23 RX ADMIN — POTASSIUM PHOSPHATE, MONOBASIC POTASSIUM PHOSPHATE, DIBASIC 15 MMOL: 224; 236 INJECTION, SOLUTION, CONCENTRATE INTRAVENOUS at 23:24

## 2023-06-23 RX ADMIN — ACETAMINOPHEN 975 MG: 325 TABLET, FILM COATED ORAL at 16:31

## 2023-06-23 RX ADMIN — ACETAMINOPHEN 975 MG: 325 TABLET, FILM COATED ORAL at 21:58

## 2023-06-23 RX ADMIN — HUMAN INSULIN 3.5 UNITS/HR: 100 INJECTION, SOLUTION SUBCUTANEOUS at 11:36

## 2023-06-23 ASSESSMENT — ACTIVITIES OF DAILY LIVING (ADL)
ADLS_ACUITY_SCORE: 37
ADLS_ACUITY_SCORE: 39
ADLS_ACUITY_SCORE: 39
ADLS_ACUITY_SCORE: 37
ADLS_ACUITY_SCORE: 39
ADLS_ACUITY_SCORE: 37
ADLS_ACUITY_SCORE: 39

## 2023-06-23 NOTE — PROGRESS NOTES
[facility]    Medicine Progress Note - Hospitalist Service, GOLD TEAM 10    Date of Admission:  6/22/2023    Assessment & Plan   56 yo female with history of DM2 on insulin pump complicated by retinopathy, peripheral neuropathy, and prior left toe amputations and chronic left foot wound, obesity, HLD admitted for sepsis 2/2 dental infection and R facial cellulitis. Hospital course complicated by DKA 2/2 infection.     Sepsis 2/2 R Canine Space Abscess, Associated Facial Cellulitis and Sinusitis   Had root canal of R maxillary 1st molar on 6/20 as outpatient which led to above complication. Presented to ED with worsening facial pain and swelling, had leukocytosis and tachycardia at admission.   -Continue levofloxacin and metronidazole (had multiple drug allergies)  -OMFS consulted, appreciate assistance    -Recomment bedside I&D R canine space abscess under local anesthesia    -Warm compress, avoid ice, HOB >30   -Dental team consulted, appreciate assistance    -Recommend 0.12% chlorhexidine glutamate mouthrinse BID  -Pain control: PRN oral oxycodone and PRN IV dilaudid     Insulin Dependent DM2  Likely DKA 2/2 infection as above   Typically has a continuous glucose monitor and an insulin pump and per notes and per patient's pump she has about 1 unit of humalog per hour. Has had her pump off for about 12 hours prior to admission.   On 6/23 AM, labs showed DKA although pH on VBG was 7.37 (?).   -Endocrine consulted, appreciate assistance   -6/23 - started insulin drip with DKA protocol in AM, with closing gap transitioned to non-DKA insulin drip in evening. Lantus given per endocrine recs.   -Trend BMP and ketones, ensure gap is closing     Left Third Toe Wound   With h/o osteomyelitis of prior amputated toes. X-ray without obvious signs of osteo.   -Podiatry/Orthopedics consulted, appreciate assistance    -No evidence of infections   -No urgent interventions   -Needs  "ongoing podiatry care  -WOCN Consulted    -Wound cares per their orders   -In addition to podiatry consult, recommend vascular consult. Will place pending remainder of hospital course.         Diet: Combination Diet Full Liquid    DVT Prophylaxis: Pneumatic Compression Devices and Ambulate every shift  Brenner Catheter: Not present  Lines: None     Cardiac Monitoring: ACTIVE order. Indication: DKA  Code Status: Full Code      Clinically Significant Risk Factors             # Anion Gap Metabolic Acidosis: Highest Anion Gap = 20 mmol/L in last 2 days, will monitor and treat as appropriate   # Coagulation Defect: INR = 1.30 (Ref range: 0.85 - 1.15) and/or PTT = N/A, will monitor for bleeding          # DMII: A1C = 6.8 % (Ref range: <5.7 %) within past 6 months, PRESENT ON ADMISSION  # Severe Obesity: Estimated body mass index is 41.61 kg/m  as calculated from the following:    Height as of this encounter: 1.778 m (5' 10\").    Weight as of this encounter: 131.5 kg (290 lb)., PRESENT ON ADMISSION          Disposition Plan     Expected Discharge Date: 06/24/2023                  Jhoana Estrella DO  Hospitalist Service, GOLD TEAM 10  [facility]  Securely message with [employer] (more info)  Text page via [facility] Paging/Directory   See signed in provider for up to date coverage information  ______________________________________________________________________    Interval History   No overnight events reported.  She is having facial pain, ongoing nausea and vomiting.     Physical Exam   Vital Signs: Temp: 99.1  F (37.3  C) Temp src: Oral BP: (!) 160/74 Pulse: 88   Resp: 20 SpO2: 97 % O2 Device: None (Room air)    Weight: 290 lbs 0 oz    Constitutional: in mild distress due to nausea/vomiting, pleasant and cooperative   Cardiovascular: regular rate and rhythm, normal S1 and S2,  no bilateral lower extremity edema   Respiratory: clear to auscultation bilaterally, no wheezing, rales, or " rhonchi, normal work of breathing   GI: abdomen soft, non tender, non distended, bowel sounds present   Neuro: alert and oriented, no gross focal deficits noted   Skin: Right upper cheek, upper lip, and lower eyelid swollen, erythematous, and tender     Medical Decision Making       50 MINUTES SPENT BY ME on the date of service doing chart review, history, exam, documentation & further activities per the note.  MANAGEMENT DISCUSSED with the following over the past 24 hours: OMFS, endocrinology       Data     I have personally reviewed the following data over the past 24 hrs:    15.1 (H)  \   13.8   / 251     137 102 7.9 /  197 (H)   3.8 20 (L) 0.50 (L) \       ALT: 12 AST: 18 AP: 107 (H) TBILI: 0.4   ALB: 4.0 TOT PROTEIN: 7.7 LIPASE: N/A       TSH: N/A T4: N/A A1C: N/A       Procal: N/A CRP: 316.00 (H) Lactic Acid: N/A       INR:  1.30 (H) PTT:  N/A   D-dimer:  N/A Fibrinogen:  N/A       Imaging results reviewed over the past 24 hrs:   Recent Results (from the past 24 hour(s))   CT Facial Bones with Contrast    Narrative    CT FACIAL BONES WITH CONTRAST 6/22/2023 6:48 PM    History:  root canal 6/20.  Facial swelling    Comparison:  None      Technique: Using thin collimation multidetector helical acquisition  technique, axial and coronal thin section CT images were reconstructed  through the facial bones. Images were reviewed in bone and soft tissue  windows.    Findings:    Reported dental procedure involving the third maxillary molar. The  third molar and lateral margins of the maxillary sinus without  evidence of dentigerous cyst, however, there is at least moderate  right maxillary sinus mucosal debris/thickening, with a milder degree  within the left maxillary sinus and scattered paranasal sinus mucosal  thickening. There is substantial right subcutaneous facial soft tissue  inflammatory change. Questionable small fluid collection along the  right hemimandible, image 21 of series 2. There is trace air foci  near  the postsurgical region (series 2 image 19-20), also with somewhat  streaky hypodensities within the inflammatory change without discrete  fluid collection. There is somewhat limited evaluation secondary to  dental hardware streak artifact. Bilateral nonenlarged reactive  parotid, some and fibular, and submental lymph nodes, as well as  bilateral upper jugular lymph node seen.     No evident fracture of the facial bones. The cribriform plate appears  intact. Alignment of the facial bones appears normal.  The orbits are  normal. Mastoid air cells are unremarkable. Visualized portions of the  brain parenchyma demonstrate no definite abnormality.       Impression    Impression:  1. Extensive inflammatory change about the right maxillary and  mandibular subcutaneous soft tissues. Questionable small fluid  collection along the right hemimandible.  2. Reactive bilateral lymph nodes, noted above.  3. Right maxillary sinusitis without definite dentigerous cyst or  odontogenic origin.    I have personally reviewed the examination and initial interpretation  and I agree with the findings.    JUDY TAN MD         SYSTEM ID:  [id number]

## 2023-06-23 NOTE — H&P
[facility]    History and Physical - Hospitalist Service, GOLD TEAM        Date of Admission:  6/22/2023    Assessment & Plan      Glenny Campos is a 57 year old female with past medical history of Type 2 diabetes mellitus (on insulin pump and previously followed with endo) (complicated by retinopathy, neuropathy, prior left toe amputations), obesity, HLD, biliary colic who presents with sepsis secondary to dental infection, sinusitis and facial cellulitis (right sided).    Sepsis secondary to dental infection, facial cellulitis  Appears quite ill. Had a root canal- was seen by dental and OMFS and they'll still follow the patient. No discrete drainable abscess seen on imaging  -has multiple drug allergies so instead of unasyn she received levofloxacin and flagyl which we will continue  -scheduled tylenol, cold packs, PRN oxycodone (low dose) for moderate pain, IV dilaudid PRN for severe pain  -Will re-image if worsening  -Will consider ENT or ophthalmology consultation if worsening/not improving    Nausea with vomiting  -PRN anti-emetics    Type 2 DM complicated by polyneuropathy, prior amputations of left toes  -A1c 6.8 on admit  -Typically has a continuous glucose monitor and an insulin pump and per notes and per patient's pump she has about 1 unit of humalog per hour. Has had her pump off for about 12 hours prior to admission  -We discussed that when her insulin needs are labile (due to poor PO intake, hyperglycemia due to infection), I wouldn't recommend her insulin pump for now  -For now, will give glargine 10 units BID and monitor blood sugars q4h (since PO intake is low)  -Sliding scale insulin as well    Left third toe wound with h/o osteomyelitis of prior amputated toes  -x-ray without obvious signs of osteo  -will consult podiatry and WOC nurse       Diet: Combination Diet Full Liquid    DVT Prophylaxis: Pneumatic Compression Devices  Brenner Catheter: Not  "present  Lines: None     Cardiac Monitoring: None  Code Status: Full Code      Clinically Significant Risk Factors Present on Admission                      # DMII: A1C = 6.8 % (Ref range: <5.7 %) within past 6 months    # Severe Obesity: Estimated body mass index is 41.61 kg/m  as calculated from the following:    Height as of this encounter: 1.778 m (5' 10\").    Weight as of this encounter: 131.5 kg (290 lb).            Disposition Plan      Expected Discharge Date: 06/24/2023                  Laureen Mitchell MD  Hospitalist Service, [facility]  Securely message with [employer] (more info)  Text page via [facility] Paging/Directory   See signed in provider for up to date coverage information    ______________________________________________________________________    Chief Complaint   Face swelling and pain    History is obtained from the patient and the patient's significant other    History of Present Illness   Glenny Campos is a 57 year old female with past medical history of Type 2 diabetes mellitus (on insulin pump and previously followed with endo) (complicated by retinopathy, neuropathy, prior left toe amputations), obesity, HLD, biliary colic who presents with sepsis secondary to dental infection, sinusitis and facial cellulitis (right sided).    She had recent root canal on 6/20. She started having increased redness, pain and swelling around the right cheek, lip, eye area. She tried essential oils on it and it continued to get worse. Has had poor appetite. Has had vomiting and unable to keep anything down. Did try pain med at home that upset her stomach -flurbiprofen.     Doesn't take much in the way of prescribed medications other than her insulin pump. She takes supplements but didn't feel up to reviewing these with me.       Past Medical History    Type 2 DM  Retinopathy  Neuropathy  HLD  Obesity  Biliary colic with h/o gallstone " pancreatitis      Past Surgical History    Left 1st and 2nd toe amputation      Prior to Admission Medications   Patient had tried some flurbiprofen prior to arrival      Social History   Employed, still working. Has significant other       Allergies   Allergies   Allergen Reactions     Cephalosporins      Other reaction(s): *Unknown     Piperacillin-Tazobactam In Dex Hives     Statins Muscle Pain (Myalgia)     Cephalexin Rash     Clindamycin Rash     possible rash, was on zosyn at the same time       Penicillins Hives and Rash        Physical Exam   Vital Signs: Temp: 99.5  F (37.5  C) Temp src: Oral BP: (!) 161/71 Pulse: 94   Resp: 18 SpO2: 98 % O2 Device: None (Room air)    Weight: 290 lbs 0 oz    Constitutional: Awake, alert, appears ill. Actively vomiting.   Head: Normocephalic. Atraumatic.   EENT: significant right eye swelling, right facial swelling, right sided lip swelling. Red and tender. Right eyelid swollen  Cardiovascular: Regular rate and rhythm. No murmurs, clicks, gallops or rubs. No edema  Respiratory: Clear to auscultation without wheezes or crackles. Normal respiratory rate and effort.   Musculoskeletal: Has a left third toe eschar. Has absence of left great toe  Skin: no suspicious lesions, rashes, jaundice, or cyanosis -  Psychiatric: mentation appears normal and affect flat      Medical Decision Making   90 MINUTES SPENT BY ME on the date of service doing chart review, history, exam, documentation & further activities per the note.      Data     I have personally reviewed the following data over the past 24 hrs:    12.2 (H)  \   14.6   / 239     136 100 9.0 /  201 (H)   4.0 21 (L) 0.53 \       ALT: 5 AST: 16 AP: 102 TBILI: 0.5   ALB: 4.2 TOT PROTEIN: 7.8 LIPASE: N/A       TSH: N/A T4: N/A A1C: 6.8 (H)       Procal: N/A CRP: N/A Lactic Acid: 1.4         Imaging results reviewed over the past 24 hrs:   Recent Results (from the past 24 hour(s))   CT Facial Bones with Contrast    Narrative    CT  FACIAL BONES WITH CONTRAST 6/22/2023 6:48 PM    History:  root canal 6/20.  Facial swelling    Comparison:  None      Technique: Using thin collimation multidetector helical acquisition  technique, axial and coronal thin section CT images were reconstructed  through the facial bones. Images were reviewed in bone and soft tissue  windows.    Findings:    Reported dental procedure involving the third maxillary molar. The  third molar and lateral margins of the maxillary sinus without  evidence of dentigerous cyst, however, there is at least moderate  right maxillary sinus mucosal debris/thickening, with a milder degree  within the left maxillary sinus and scattered paranasal sinus mucosal  thickening. There is substantial right subcutaneous facial soft tissue  inflammatory change. Questionable small fluid collection along the  right hemimandible, image 21 of series 2. There is trace air foci near  the postsurgical region (series 2 image 19-20), also with somewhat  streaky hypodensities within the inflammatory change without discrete  fluid collection. There is somewhat limited evaluation secondary to  dental hardware streak artifact. Bilateral nonenlarged reactive  parotid, some and fibular, and submental lymph nodes, as well as  bilateral upper jugular lymph node seen.     No evident fracture of the facial bones. The cribriform plate appears  intact. Alignment of the facial bones appears normal.  The orbits are  normal. Mastoid air cells are unremarkable. Visualized portions of the  brain parenchyma demonstrate no definite abnormality.       Impression    Impression:  1. Extensive inflammatory change about the right maxillary and  mandibular subcutaneous soft tissues. Questionable small fluid  collection along the right hemimandible.  2. Reactive bilateral lymph nodes, noted above.  3. Right maxillary sinusitis without definite dentigerous cyst or  odontogenic origin.    I have personally reviewed the examination and  initial interpretation  and I agree with the findings.    JUDY TAN MD         SYSTEM ID:  [id number]

## 2023-06-23 NOTE — PROGRESS NOTES
Patient vomiting at bedside, zofran administered. Complaining that she is thirsty, but refuses to drink our water because it is not bottled water.

## 2023-06-23 NOTE — CONSULTS
[facility]  ORTHOPAEDIC SURGERY CONSULT - HISTORY AND PHYSICAL    DATE OF CONSULT: 6/23/2023 10:43 AM    REQUESTING PROVIDER: Jhoana Estrella DO, MD - [facility] medicine team staff.    CC: Chronic ulcers on the distal aspect of the third toe on the left foot and remaining second toe of the right foot    DATE OF INJURY: About a month and a half ago    HISTORY OF PRESENT ILLNESS:   The orthopaedic surgery service was consulted by Jhoana Bueno DO for evaluation and treatment recommendations of chronic left foot wounds.    Glenny Campos is a 57 year old  female with pmhx type 2 diabetes who is currently admitted to the emergency department with oral abscess in setting of recent root canal that has now been complicated by spread causing facial cellulitis.  Orthopedic surgery has been called to evaluate patient's left foot given that patient has chronic ulcers and want a rule out as a additional source of infection.  Patient has history of amputation of the first toe on the left foot in 2020.  Patient reports that she had the distal phalanx amputated of the second toe on the left foot in May of this year.  Patient reports at that time she developed an ulcer on the adjacent third toe at the distal aspect.  Patient has been receiving wound care.  Patient denies any recent trauma or injuries to the foot.  She has baseline neuropathy secondary to her diabetes.  Patient reports that her oral symptoms started a few days ago after she underwent a revision of her root canal.  Patient reports that she has had fevers and chills which she attributes to the oral infection.  She is not complaining of any current pain in the left foot at this time.  Denies any new numbness or tingling in the left foot.    Patient currently denies any complaints of chest pain or shortness of breath.    PAST MEDICAL HISTORY:   Past Medical History:   Diagnosis Date     Type 2 diabetes mellitus with diabetic polyneuropathy (H)       [Patient denies any personal history of bleeding disorders, clotting disorders, or adverse reactions to anesthesia].    PAST SURGICAL HISTORY:    History reviewed. No pertinent surgical history.    MEDICATIONS:   Prior to Admission medications    Not on File       ALLERGIES:   Cephalosporins, Piperacillin-tazobactam in dex, Statins, Cephalexin, Clindamycin, and Penicillins    SOCIAL HISTORY:   Social History     Socioeconomic History     Marital status:      Spouse name: Not on file     Number of children: Not on file     Years of education: Not on file     Highest education level: Not on file   Occupational History     Not on file   Tobacco Use     Smoking status: Not on file     Smokeless tobacco: Not on file   Substance and Sexual Activity     Alcohol use: Not on file     Drug use: Not on file     Sexual activity: Not on file   Other Topics Concern     Not on file   Social History Narrative     Not on file     Social Determinants of Health     Financial Resource Strain: Not on file   Food Insecurity: Not on file   Transportation Needs: Not on file   Physical Activity: Not on file   Stress: Not on file   Social Connections: Not on file   Intimate Partner Violence: Not on file   Housing Stability: Not on file     Living situation: Patient lives in a home with her  who is with her at bedside.    Tobacco: Denies active use  Alcohol: Occasional drinker  Illicit Drugs: Denies    FAMILY HISTORY:  History reviewed. No pertinent family history.    Patient denies known family history of bleeding, clotting, or anesthesia related complications.     REVIEW OF SYSTEMS:   Positive for findings reported in HPI. Otherwise a 10-point reviews of systems was negative except as noted above in the HPI.     PHYSICAL EXAM:   Vitals:    06/22/23 1800 06/22/23 1935 06/23/23 0130 06/23/23 0849   BP:  (!) 161/71 (!) 154/70 134/71   BP Location:   Left arm Right arm   Pulse: 90 94 97    Resp:  18 18 22   Temp:  99.5  F  (37.5  C)  98.4  F (36.9  C)   TempSrc:  Oral  Oral   SpO2: 97% 98% 98% 98%   Weight:       Height:         General: Awake, alert, appropriate, following commands, NAD.  Lungs: Breathing comfortably and nonlabored, no wheezes or stridor noted.  Heart/Cardiovascular: Regular pulse, no peripheral cyanosis.  Back: Nontender to palpation throughout, no step-offs or deformities appreciated. Bilateral SI joints non-tender.   Pelvis: Stable to AP and Lateral compression, non-tender.  Right Upper Extremity: No deformity, skin intact. No significant tenderness to palpation over clavicle, AC joint, shoulder, arm, elbow, forearm, wrist. Normal ROM shoulder, elbow, wrist without pain. Motor intact distally with finger flexion/extension/intrinsics/EPL, OK sign 5/5 strength. SILT ax/m/r/u nerve distributions. Radial pulse palpable, 2+.   Left Upper Extremity: No deformity, skin intact. No significant tenderness to palpation over clavicle, AC joint, shoulder, arm, elbow, forearm, wrist. Normal ROM shoulder, elbow, wrist without pain. Motor intact distally with finger flexion/extension/intrinsics/EPL, OK sign 5/5 strength. SILT ax/m/r/u nerve distributions. Radial pulse palpable, 2+.   Right Lower Extremity: No deformity, skin intact. No significant tenderness to palpation over thigh, knee, leg, ankle/foot. No pain with ROM hip/knee/ankle. Motor intact distally TA/GSC/EHL/FHL with 5/5 strength.  Patient has diminished sensation to light touch in the lower extremity up to about the mid shin. DP/PT pulses palpable, 2+, toes warm and well perfused.   Left Lower Extremity: Patient has a well-healed incision from the amputation of the first toe.  The tip of the remaining second toe is well-healed but patient does have a granulated wound noted over the dorsal aspect of the second toe extending into the webspace between the second and third toe.  The third toe tip is blackened with appearance of dry gangrene.  There is no palpable  fluctuance.  Patient does not have any significant erythema noted at the forefoot.  There is no palpable fluctuance noted at the forefoot in the vicinity of the first second and third toe.  There is no drainage noted.  No significant tenderness to palpation over thigh, knee, leg, ankle/foot. No pain with ROM hip/knee/ankle. Motor intact distally TA/GSC/EHL/FHL with 5/5 strength.  Patient has diminished sensation to light touch in the lower extremity up to about the mid shin DP/PT pulses palpable, 2+, toes warm and well perfused.     LABS:  Hemoglobin   Date Value Ref Range Status   06/23/2023 13.8 11.7 - 15.7 g/dL Final   06/22/2023 14.6 11.7 - 15.7 g/dL Final     WBC Count   Date Value Ref Range Status   06/23/2023 15.1 (H) 4.0 - 11.0 10e3/uL Final     Platelet Count   Date Value Ref Range Status   06/23/2023 251 150 - 450 10e3/uL Final     INR   Date Value Ref Range Status   06/23/2023 1.30 (H) 0.85 - 1.15 Final     Creatinine   Date Value Ref Range Status   06/23/2023 0.51 0.51 - 0.95 mg/dL Final     Glucose   Date Value Ref Range Status   06/23/2023 300 (H) 70 - 99 mg/dL Final     GLUCOSE BY METER POCT   Date Value Ref Range Status   06/23/2023 280 (H) 70 - 99 mg/dL Final     No results found for: CRP  No results found for: SED      IMAGING:  Images of the left foot reviewed.  No obvious osteomyelitis noted.  There are no new fractures noted.  Nor dislocations.    IMPRESSION:   Glenny Campos is a 57 year old who is currently admitted to the hospital and is undergoing medical management for oral/facial infection in setting of recent revision root canal.  Patient does have chronic ulcers of the left lower extremity and has had ulcers progressed to require amputation in the past.  Evaluation of the wounds on the left foot right now do not show any clear evidence of acute infection.  Patient's inflammatory markers are elevated and this is likely secondary to the obvious oral abscesses that patient is being  managed for.  Radiographic imaging of the left foot do not show any signs of osteo mellitus.  There is no recommendation for any urgent intervention of the left foot at this time.  Patient will benefit from ongoing podiatry care and wound care management of the chronic ulcers.  Patient is currently on antibiotics for her abscesses and the mouth.  There is no recommendation for antibiotics for the left foot.  Podiatry will be contacted and made aware of this patient and will evaluate.  Continue care per primary team.  Patient can continue to be weightbearing as tolerated on the lower extremities.    Orthopaedics will follow this patient peripherally at this time. Please call or page me or the on-call resident with any concerns or questions.        Assessment and Plan discussed with Dr. Rockwell,      Thank you,    Xavier Wakefield MD   [id number]  Department of Orthopaedic Surgery  Pager [phone]

## 2023-06-23 NOTE — ED NOTES
Pt inquired about placing her home insulin pump. Per Dr Cantu, blood sugar and insulin management with be addressed by admitting team. Pt updated.

## 2023-06-23 NOTE — PROGRESS NOTES
[facility]    Patient: Glenny Campos  : 1965  MRN: [id number]  Date of Admission: 2023  Date of Visit: 2023  Requesting Provider: Jhoana Estrella    Assessment and Recommendations:   Assessment:  Glenny Campos is a 57 year old female with PMH of T2DM, obesity, HLD, biliary colic presents with sepsis secondary to dental infection related to tooth #3 (upper right first molar) and right-sided facial cellulitis      Recommendations:  1. Continue with IV Flagyl and levofloxacin. Patient was provided however refused option to drain abscess under local anesthesia and/or nitrous   2. Continue PRN oxycodone and IV dilaudid for pain   3. Continue anti-emetics PRN  4. Recommend 0.12% chlorhexidine glutamate mouthrinse BID (ordered for you). Patient initially refused as she prefers to take her essential oil mouthrinse, writer was able to eventually convince patient to rinse prescribed mouthrinse besides the essential oil mouthrinse for the intra-oral anti-bacterial effects.  5. Recommend warm compression, avoid cold packs  6. Head of bed > 30   7. Dental team will continue to follow up while inpatient   8. Patient to be follow up with Dr. Estrella Soler from [facility] upon discharge. Next visit is planned next Tuesday     Clinic information:   [facility]  [address] [place] [address]  [phone]    Recommendations discussed with attending Dr. Jose Enrique Granados    The [facility] team will continue to follow along. Please feel free to call dental on call (number on amc) with any questions.     Sarahi Gamboa DDS   [facility] PGY-1   Pager: [phone]    History of Present Illness:   No acute event overnight. Patient continues to reports pain and nausea throughout the night. She denies vomiting. She denies other constitutional symptoms. She reports that the soreness today goes  down to her right throat.     Patient was asleep when writer came for interview and exam, however was arousable and stayed awake the whole exam and interview.    Patient was accompanied with her  Zhao during the entirety of the interview and exam. Bill is aware about patient refusing local anesthesia and prescribed mouthrinse and he is willing to try to convince patient to use the prescribed mouthrinse.    Review of Systems:     Complete ROS obtained. Pertinent positives/negatives as noted in the HPI.     CONSTITUTIONAL:  No fevers or chills  INTEGUMENTARY/SKIN: negative for worrisome rashes, moles or lesions. Positive for her left third toe lesions.   EYES: Negative for icterus, vision changes or irritation  ENT/MOUTH:  Positive for oral lesions and sore throat  RESPIRATORY:  Negative for cough and dyspnea  CARDIOVASCULAR:  Negative for chest pain, palpitations and  shortness of breath  GASTROINTESTINAL:  Negative for abdominal pain, nausea, vomiting, diarrhea and constipation  GENITOURINARY:  Negative for dysuria, hematuria, frequency and urgency  MUSCULOSKELETAL: Negative for joint pain, swelling, motion restriction, negative for musculoskeletal pain  NEURO:  Negative for headache, altered mental status, numbness or weakness  PSYCHIATRIC: Negative for changes in mood or affect  HEMATOLOGIC/LYMPHATIC: negative for lymphadenopathy or bleeding  ALLERGIC/IMMUNOLOGIC: Negative for allergic reaction   ENDOCRINE: Negative for temperature intolerance, skin/hair changes    Past Medical History:     Past Medical History:   Diagnosis Date     Type 2 diabetes mellitus with diabetic polyneuropathy (H)        Allergies:     Allergies   Allergen Reactions     Cephalosporins      Other reaction(s): *Unknown     Piperacillin-Tazobactam In Dex Hives     Statins Muscle Pain (Myalgia)     Cephalexin Rash     Clindamycin Rash     possible rash, was on zosyn at the same time       Penicillins Hives and Rash       Family  "History:   History reviewed. No pertinent family history.    Social History:     Social History     Socioeconomic History     Marital status:      Spouse name: Not on file     Number of children: Not on file     Years of education: Not on file     Highest education level: Not on file   Occupational History     Not on file   Tobacco Use     Smoking status: Not on file     Smokeless tobacco: Not on file   Substance and Sexual Activity     Alcohol use: Not on file     Drug use: Not on file     Sexual activity: Not on file   Other Topics Concern     Not on file   Social History Narrative     Not on file     Social Determinants of Health     Financial Resource Strain: Not on file   Food Insecurity: Not on file   Transportation Needs: Not on file   Physical Activity: Not on file   Stress: Not on file   Social Connections: Not on file   Intimate Partner Violence: Not on file   Housing Stability: Not on file         Physical Exam:   BP (!) 154/70 (BP Location: Left arm)   Pulse 97   Temp 99.5  F (37.5  C) (Oral)   Resp 18   Ht 1.778 m (5' 10\")   Wt 131.5 kg (290 lb)   SpO2 98%   BMI 41.61 kg/m       General: awake and alert laying in bed with some distress from pain and nausea.   HEENT: AT/NC. Sclera and conjunctiva clear. Pupils equal. TMs normal. Nasal and oral cavities without lesions. No tonsillar erythema, edema, or exudates.     Extraoral exam: NC/AT, EOMI, PERRL, elba-orbital edema has been improved since yesterday with eye opening improved this morning almost as wide as the contralateral side. Facial edema at the right sided cheek is still present with induration and overlying erythema, no purulent drainage. Mild edema at right sided chin with no erythema or induration or purulent drainage.     No obvious submandibular lymphadenopathy, no abnormal skin lesions, inferior border of mandible is palpable bilaterally, CAROL limited to 2 fingers. No TMJ discomfort bilaterally.      Intraoral exam: limited due " to patient's limited mouth opening and refusing to open wide for exam. Tooth #3 with presence of polyethylene sutures in place at the lingual aspect, buccal aspect with presence of white patched mucosa, most likely from food impaction and lack of movements. Mallampati III. CAROL limited to 2 fingers. FOM soft and tender.     Neck: Supple.   Heart: RRR.   Lungs: Effort normal. CTAB.   Abdomen: Bowel sounds present. S/NT/ND. No organomegaly appreciated.   Extremities: Right third toe lesions with presence of packing no obvious persistent bleeding.  Skin: No suspicious lesions on exposed areas.   Lymphatic: No concerning LAD.  Neurologic: Mentation intact. Speech normal. Moves all extremities spontaneously. Grossly non-focal.   Psychiatric: Mood appropriate. Behavior normal.     Recent Microbiology Data:   No results for input(s): CULT, SDES in the last 168 hours.    Imaging:     Dental CT reveals condyles seated in fossa bilaterally, maxillary sinuses clear bilaterally. View of upper dentition is somewhat limited due to tooth restorative meterial artifacts. Presence of dental material at the apex of roots #3. Also small fluid collection along the right salvador-mandible.     Recent Results (from the past 48 hour(s))   CT Facial Bones with Contrast    Narrative    CT FACIAL BONES WITH CONTRAST 6/22/2023 6:48 PM    History:  root canal 6/20.  Facial swelling    Comparison:  None      Technique: Using thin collimation multidetector helical acquisition  technique, axial and coronal thin section CT images were reconstructed  through the facial bones. Images were reviewed in bone and soft tissue  windows.    Findings:    Reported dental procedure involving the third maxillary molar. The  third molar and lateral margins of the maxillary sinus without  evidence of dentigerous cyst, however, there is at least moderate  right maxillary sinus mucosal debris/thickening, with a milder degree  within the left maxillary sinus and  scattered paranasal sinus mucosal  thickening. There is substantial right subcutaneous facial soft tissue  inflammatory change. Questionable small fluid collection along the  right hemimandible, image 21 of series 2. There is trace air foci near  the postsurgical region (series 2 image 19-20), also with somewhat  streaky hypodensities within the inflammatory change without discrete  fluid collection. There is somewhat limited evaluation secondary to  dental hardware streak artifact. Bilateral nonenlarged reactive  parotid, some and fibular, and submental lymph nodes, as well as  bilateral upper jugular lymph node seen.     No evident fracture of the facial bones. The cribriform plate appears  intact. Alignment of the facial bones appears normal.  The orbits are  normal. Mastoid air cells are unremarkable. Visualized portions of the  brain parenchyma demonstrate no definite abnormality.       Impression    Impression:  1. Extensive inflammatory change about the right maxillary and  mandibular subcutaneous soft tissues. Questionable small fluid  collection along the right hemimandible.  2. Reactive bilateral lymph nodes, noted above.  3. Right maxillary sinusitis without definite dentigerous cyst or  odontogenic origin.    I have personally reviewed the examination and initial interpretation  and I agree with the findings.    UJDY TAN MD         SYSTEM ID:  [id number]

## 2023-06-23 NOTE — CONSULTS
NEW INPATIENT DIABETES MANAGEMENT CONSULT  Glenny Campos  Age: 57 year old  MRN # [id number]   YOB: 1965    Chief Complaint: Right Facial Cellulitis  Reason for Consult: Diabetes on an insulin pump ?DKA  Consulting Provider: Jhoana Patel    History of Present Illness:   Glenny Campos is a 57 year old female with past medical history of Type 2 diabetes mellitus (on insulin pump and previously followed with endo) (complicated by retinopathy, neuropathy, prior left toe amputations), obesity, HLD, biliary colic who presents with sepsis secondary to dental infection, sinusitis and facial cellulitis (right sided).    Diabetes Focus:  Glenny Campos presents with Right facial cellulitis post dental procedure( root canal) this past Tuesday.  She has not been able to eat since Tuesday.  She has had pain and low grade fevers.  She has had some vomiting she thinks from the pain medicine.  The nausea and vomiting were worse overnight. She is urinating frequently she thinks from the IV fluids and reports thirst.  Cannot really tell me if this was the case prior to admission.  She says she always runs higher BG overnight.  Her BG in the am have been 150 and then the rest of the day they are 200's.  She cannot tell me if she was running in basal 1 or basal 2.  She says she removed her pod-- she thinks on Wednesday night and did not replace it because she thought she would need an MRI and she thought she needed to come in.  Her BG were initially in the low 200's upon admission and then this morning up to the 300's.  She received Lantus 10 last night and Lantus 10 again this morning with medium sliding scale.  Her presenting Co2=21 and then Co2= 17 this am.  Her AG was 15 and AG 20 this am.  Her ketone this am=4.00 and her azau=373.   Her VBG has ph=7.37. She has good mentation although teary and ill appearing.       Review BG:      Other Active Medical Problems: Right facial cellulitis  Diabetes Mellitus  Type: 2  Duration:    Diagnosed in 1997  Diabetic Complications: partial left 2nd toe amputation  With infection of  Third toe, diabetic retinopathy, diabetic neuropathy  Prior to Admission Diabetes Regimen:    Did not tolerate metformin  Gerard 3  Omnipod Dash ( has been wearing this since 10/13/2022)    BG monitor: Gerard 3  Frequency of checks: multiple times daily  Omnipod Dash  Basal 1  00:00-11:29-->  1.05  11:30 am to 17:59-->  1.0  18:00 to 23:59-->  1.05  Total basal=24.85  ISF=1:45  Insulin to carb ratio: 1:15   Target Glucose=100-110  Total Gluc 4 hours    Inactive Basal 2  00:00-06:59--> 1.5  07:00-16:59--> 1  17:00--23:59-->2  Total basal 34.5      Omnipod Dash setting per Epic in 11/2/22    Basal 00:00-09:59 am-->  1.5  10:00 am to 21:59--> 1.0  22:00 to 23:59--> 1.5    ISF=1:55  1:18 carb ratio  Target=110-120    Diet: 2 meals but has not eaten since Tuesday  Usual BG control PTA:  with A1c 6.8 on 6/22/2023  History of DKA:  Less likely DKA but just ketosis  Able to Detect Hypoglycemia: yes she gets sweaty and fatigue  Usual Diabetes Care Provider: Lori Leone MD   Department of Diabetes and Endocrinology   [facility]     Primary Care Provider: Provider Not In System  Factors Impacting IP Glucose Control: Cellulitis of the right face, not able to eat well due to swelling, prefers supplements rather then medication  Current Diet: full liquid diet    10 point ROS completed with pertinent positives and negatives noted in the HPI  Past medical, family and social histories are reviewed and updated.    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 [employer]    Family History     Dementia Birth Father    possible   Cancer, Colon Birth Mother        Cataract Birth Mother        Diabetes, Type II Birth  "Mother        Glaucoma Birth Mother        Cataract Maternal Grandfather        Cerebrovascular Disease Maternal Grandfather        Coronary Artery Disease Maternal Grandmother    CHF   Glaucoma Other    maternal aunts       PMH:  Amputation of left great toe (C) 10/21/2020   Background diabetic retinopathy(362.01) 2/19/2010   Chicken pox   Cortical senile cataract LT>RT 10/31/2013   Dermatophytosis of foot 2/18/2015   Diabetes ([facility])   Diabetes mellitus, type 1 ([facility]) 1997   dx age 31   Diabetes, neuropathy   Diabetes, retinopathy   Diabetic neuropathy ([facility])   ICD 10   Diabetic polyneuropathy ([facility]) 10/9/2013   Per documentation 10-10-12, Chip Ye   Diabetic ulcer of toe of left foot associated with type 1 diabetes mellitus, limited to breakdown of skin ([facility]) 4/26/2017   Essential hypertension 11/30/2015   FHx: glaucoma 3/12/2009   Gall stone   Hammer toes of both feet 4/27/2016   Hypercholesterolemia   Keloid scar 2/25/2015   Pancreatic pseudocyst 1996   infected/drained   Pancreatitis 1992   ? GB disease   Pes planus of both feet 4/27/2016   Plantar fasciitis, right 3/29/2018   Scarring, keloid 2/5/2009   Tinea pedis of both feet 4/27/2016     Physical Exam   BP (!) 154/70 (BP Location: Left arm)   Pulse 97   Temp 99.5  F (37.5  C) (Oral)   Resp 18   Ht 1.778 m (5' 10\")   Wt 131.5 kg (290 lb)   SpO2 98%   BMI 41.61 kg/m    General: awake, alert appears ill,  present at side   HEENT: normocephalic, atraumatic. significant right eye swelling, right facial swelling, right sided lip swelling. Red  Right eyelid swollen  Lungs: unlabored respiration, no cough  ABD: rounded, nondistended  Skin: warm and dry, no obvious lesions but limited exam, venous stasis changes on legs  Right arm with ilbre  MSK:  moves all extremities, Has a left third toe with black tissue. Has absence of left great toe and part of left second toe  Lymp:  some LE edema   Mental status:  alert, oriented to self, place, " date  Psych:  teary but appropriate interaction     Most Recent Laboratory Tests:  Recent Labs   Lab 06/23/23 0622   HGB 13.8     Recent Labs   Lab 06/22/23  1640   A1C 6.8*     Recent Labs   Lab 06/23/23 0622   CR 0.51     Recent Labs   Lab 06/23/23  0622 06/23/23  0105 06/22/23 2133 06/22/23  1640   * 303* 201* 222*     ROUTINE IP LABS (Last four results)  BMPRecent Labs   Lab 06/23/23  1134 06/23/23  0918 06/23/23  0854 06/23/23  0622 06/22/23 2133 06/22/23  1640   NA  --  136  --  134*  --  136   POTASSIUM  --  4.3  --  4.2  --  4.0   CHLORIDE  --  99  --  100  --  100   JOSE CARLOS  --  9.2  --  8.7  --  9.5   CO2  --  17*  --  15*  --  21*   BUN  --  7.7  --  7.4  --  9.0   CR  --  0.57  --  0.51  --  0.53   * 320* 280* 300*   < > 222*    < > = values in this interval not displayed.     CBC  Recent Labs   Lab 06/23/23 0622 06/22/23  1640   WBC 15.1* 12.2*   RBC 4.43 4.66   HGB 13.8 14.6   HCT 40.6 43.1   MCV 92 93   MCH 31.2 31.3   MCHC 34.0 33.9   RDW 12.4 12.1    239     INR  Recent Labs   Lab 06/23/23 0622   INR 1.30*     Lab Results   Component Value Date    AST 18 06/23/2023     Lab Results   Component Value Date    ALT 12 06/23/2023     No results found for: BILICONJ   Lab Results   Component Value Date    BILITOTAL 0.4 06/23/2023     Lab Results   Component Value Date    ALBUMIN 4.0 06/23/2023     Lab Results   Component Value Date    PROTTOTAL 7.7 06/23/2023      Lab Results   Component Value Date    ALKPHOS 107 06/23/2023     Hemoglobin A1C (%)   Date Value   08/25/2022 7.2 (H)      Point of Care A1c (%)   Date Value   11/30/2017 7.4 (H)      Hemoglobin A1C (Rapid) (%)   Date Value   04/16/2019 8.8 (H)     trend or trigger automated decision support.     C-Peptide, Serum  Order: [id number]   Ref Range & Units 10 mo ago   C-Peptide 0.5 - 3.3 ng/mL 1.2    Comment: INTERPRETIVE INFORMATION: Serum, C-Peptide     Reference Interval applies to fasting specimens. To convert to   nmol/L,  multiply by 0.33   Performed By: [employer]   [address] [place] [address]   : Gurvinder Haro MD, PhD   Resulting Agency  [facility]     Specimen Collected: 08/25/22  8:26 AM    Performed by: [facility] Last Resulted: 08/27/22  7:49 PM   Received From: [facility]day  Result Received: 06/22/23  3:12 PM       Assessment:   Glenny Campos is a 57 year old women with past medical history of Type 2 diabetes mellitus (on insulin pump and previously followed with endo) (complicated by retinopathy, neuropathy, prior left toe amputations), obesity, HLD, biliary colic who presents with sepsis secondary to dental infection, sinusitis and facial cellulitis (right sided).    1.  Type 2 diabetes, suboptimally controlled with retinopathy, amputation, neuropathy  2.  Possible diabetic ketoacidosis  VBG ph=7.37, AG=20 and Co2=15  3.  Infection/stress  induced hyperglycemia  4. Elevated BMI    Plan:     -Continue the insulin drip, could change to non DKA since AG continues to improve after BMP this evening   -  Lantus 20 units, will give again this evening at 21:00   -Novolog 1:15 CHO coverage, although not really eating   -Fluids per primary team   -Hold Novolog medium sliding scale   -BG monitoring every hour   -hypoglycemia protocol   -recommend diet per primary care with carb counting protocol   -diabetes education needs will be assessed closer to discharge   -on discharge, will recommend outpatient follow up with [facility] endocrinology and CDE    [facility] by [facility]  Thank you for this consult; Inpatient Diabetes will continue to follow.     To contact Endocrine Diabetes service:   From 8AM-4PM: page inpatient diabetes provider that is following the patient  For questions or updates from 4PM-8AM: page the diabetes job code for on call fellow: [phone]    Review of prior external note(s) from -  [facility] or Care Everywhere   80 minutes spent on the date of the encounter doing chart review, history and exam, documentation and further activities per the note     Over 50% of my time on the unit was spent counseling the patient and/or coordinating care regarding acute hyperglycemia management.  See note for details.         Estrella Liu PA-C  Inpatient Diabetes Service  Pager   [phone]  Date of Service: 6/23/2023

## 2023-06-23 NOTE — ED PROVIDER NOTES
This patient was signed over to me by my colleague pending dental residency consultation and CT read.  CT demonstrates no sign of obvious drainable abscess.  The patient was evaluated by the dental resident in the emergency department with recommendation against acute bedside procedure.  Recommendation provided was for hospitalization for antibiotics due to concern of facial cellulitis.  Patient currently protecting airway and vitally stable currently appropriate for floor level of care.     Khris Cantu MD  06/22/23 1954

## 2023-06-23 NOTE — CONSULTS
[facility] Nurse Inpatient Assessment     Consulted for: left 3rd toe podiatry also consulted     Summary: spouse present and giving helpful information     Patient History (according to provider note(s):      Glenny Campos is a 57 year old female with past medical history of Type 2 diabetes mellitus (on insulin pump and previously followed with endo) (complicated by retinopathy, neuropathy, prior left toe amputations), obesity, HLD, biliary colic who presents with sepsis secondary to dental infection, sinusitis and facial cellulitis (right sided).   She also has complaints of a left third toe infection and has previously had amputation of her great toe.     Assessment:      Areas visualized during today's visit: Focused:    Wound Location:  Left 2nd & 3rd toe      Date of last photo 6/23  Wound History: had had previous amputations and recently had a partial amputation of 2nd toe sees a podiatrist   Wound Base:   2nd toe 100 % granulation tissue moderate amounts of drainage   3rd toe 98% 2% eschar & unknown but red and draining small amounts of serosanguinous      Palpation of the wound bed: would not tolerate it       Drainage: moderate     Description of drainage: serosanguinous     Measurements (length x width x depth, in cm)   2nd toe 2  x 2  x  0.2 cm  3rd toe  2.5 x 2 cm      Tunneling N/A     Undermining N/A  Periwound skin: edematous, irritant dermatitis, macerated and 3rd toe has thick yellow hyperkeratotic build up       Color: red      Temperature: hot  Odor: none  Pain: severe and tension to hands, feet and body, intermittent and stabbing      Treatment Plan:     Left 2nd & 3rd toes - q shift until POD sees and writes orders   Apply vashe moistened #[id number]  2x2 gauze between toes x 10 minutes   Remove and place a NS moistened Aquacel Ag to open wound  on 2nd toe only (cut to fit)  Betadine to 3rd toe   Apply kerlix gauze to secure     Orders:  Written    RECOMMEND PRIMARY TEAM ORDER: Podiatry consult and Vascular consult  Education provided: plan of care and Infection prevention   Discussed plan of care with: Patient and Family  [facility] nurse follow-up plan: weekly and only if Podiatry signs off   Notify WO if wound(s) deteriorate.  Nursing to notify the Provider(s) and re-consult the WO Nurse if new skin concern.    DATA:     Current support surface: Standard  ED cart  Containment of urine/stool: Continent of bladder and Continent of bowel  BMI: Body mass index is 41.61 kg/m .   Active diet order: Orders Placed This Encounter      Combination Diet Full Liquid     Output: No intake/output data recorded.     Labs: Recent Labs   Lab 06/23/23  0622 06/22/23  1640   ALBUMIN 4.0 4.2   HGB 13.8 14.6   INR 1.30*  --    WBC 15.1* 12.2*   A1C  --  6.8*     Pressure injury risk assessment:   Sensory Perception: 3-->slightly limited  Moisture: 4-->rarely moist  Activity: 3-->walks occasionally  Mobility: 3-->slightly limited  Nutrition: 2-->probably inadequate  Friction and Shear: 2-->potential problem  Sloan Score: 17    SHWETA Mauro RN CWOCN  Pager no longer is use, please contact through [employer] group: [facility] Nurse  Dept. Office Number: [phone]

## 2023-06-23 NOTE — CONSULTS
ORAL & MAXILLOFACIAL SURGERY   CONSULT  Glenny Campos,  MRN: [id number],  : 1965        ASSESSMENT   57 year old female with pmh significant for T2DM now in DKA, obesity, HLD, biliary colic presents with sepsis 2/2 to a right canine space abscess and worsening facial cellulitis and sinusitis following a dental procedure (likley apicoectomy per patient description) completed by an endodontist on  of her right maxillary 1st molar.    PLAN  1. Rec bedside I&D right canine space abscess under local anesthesia  2. Patient in DKA- d/w primary, will defer surgical intervention until better glycemic control.   3. Continue levo and flagyl given allergy to penicillin  4. Endocrinology on board, appreciate recs   5. Warm compresses to right face, avoid ice  6.  HOB > 30  7.  At this time, no concern for potential airway compromise. Indicated procedure is not emergent and can be delayed until medically optimized  8. Remainder of care per primary  9. OMFS will continue to follow while inpatient    Please contact the OMFS resident on-call with questions or concerns.    Discussed with chief resident and staff.    Evelio Thomas DDS  Oral & Maxillofacial Surgery, Intern    Oral and [facility]illofacial Surgery Clinic - [facility]  [address] [place] [address]  Clinic phone number: [phone]  Clinic fax number: [phone]  ________________________________________      HPI  57 year old female with pmh significant for T2DM, obesity, HLD, biliary colic presents with sepsis 2/2 to a right canine space abscess and worsening facial cellulitis and sinusitis following a dental procedure (likley apicoectomy per patient description) completed by an endodontist on  of her right maxillary 1st molar. Patient states that following the procedure her face became significantly swollen and painful. She reports to putting ice on the swelling which made it  worse. She ultimately presented to the ED for evaluation. A CT facial with contrast demonstrates early abscess vs phlegmon changes along the right posterior maxilla adjacent tooth #3. She currently denies dysphagia, odynophagia, dyspnea, or dysphonia. She is tolerating her secretions well. Otherwise denies f/c/n/v or any constitutional symptoms.    HISTORY  Past Medical History:   Past Medical History:   Diagnosis Date     Type 2 diabetes mellitus with diabetic polyneuropathy (H)      Past Surgical History: History reviewed. No pertinent surgical history.  Medications:   No current facility-administered medications on file prior to encounter.  No current outpatient medications on file prior to encounter.       acetaminophen  975 mg Oral Q8H     chlorhexidine  15 mL Swish & Spit BID     insulin aspart  1-7 Units Subcutaneous TID AC     insulin aspart  1-5 Units Subcutaneous At Bedtime     insulin aspart   Subcutaneous TID w/meals     insulin glargine  10 Units Subcutaneous BID     levofloxacin  750 mg Intravenous Q24H     metroNIDAZOLE  500 mg Intravenous Q12H     sodium chloride (PF)  3 mL Intracatheter Q8H     Allergies:   Allergies   Allergen Reactions     Cephalosporins      Other reaction(s): *Unknown     Piperacillin-Tazobactam In Dex Hives     Statins Muscle Pain (Myalgia)     Cephalexin Rash     Clindamycin Rash     possible rash, was on zosyn at the same time       Penicillins Hives and Rash     Social History:   Social History     Socioeconomic History     Marital status:      Spouse name: Not on file     Number of children: Not on file     Years of education: Not on file     Highest education level: Not on file   Occupational History     Not on file   Tobacco Use     Smoking status: Not on file     Smokeless tobacco: Not on file   Substance and Sexual Activity     Alcohol use: Not on file     Drug use: Not on file     Sexual activity: Not on file   Other Topics Concern     Not on file   Social  History Narrative     Not on file     Social Determinants of Health     Financial Resource Strain: Not on file   Food Insecurity: Not on file   Transportation Needs: Not on file   Physical Activity: Not on file   Stress: Not on file   Social Connections: Not on file   Intimate Partner Violence: Not on file   Housing Stability: Not on file       REVIEW OF SYSTEMS  10 point ROS reviewed and negative aside from listed in HPI    PHYSICAL EXAM  Vital Signs:   Vitals:    06/22/23 1800 06/22/23 1935 06/23/23 0130 06/23/23 0849   BP:  (!) 161/71 (!) 154/70 134/71   BP Location:   Left arm Right arm   Pulse: 90 94 97    Resp:  18 18 22   Temp:  99.5  F (37.5  C)  98.4  F (36.9  C)   TempSrc:  Oral  Oral   SpO2: 97% 98% 98% 98%   Weight:       Height:           GEN: WD/WN female, NAD  HEENT: NC/AT, EOMI, PERRL, moderate indurated swelling of the right infraorbital area, inferior border of the mandible is palpable bilaterally, no submandibular or submental swelling, fluctuant swelling along the right maxillary vestibule, no vestibular swelling of the mandible, FOM is soft and nontender, uvula is midline, ACROL 30mm spontaneously, can be manipulated to 40mm under distress, uvula is midline, no purulent drainage,   CV: RRR, no M/G/R, warm and well-perfused  PULM: CTAB, breathing comfortably on room air  GI: Soft, ND/NT  MSK: SHUKLA, no peripheral extremity edema  NEURO: AAOx4, CN II-XII intact bilaterally  PSYCH: Appropriate mood and affect            REVIEW OF LABORATORY DATA  Most Recent 3 CBC's:  Recent Labs   Lab Test 06/23/23 0622 06/22/23  1640   WBC 15.1* 12.2*   HGB 13.8 14.6   MCV 92 93    239      Most Recent 3 BMP's:  Recent Labs   Lab Test 06/23/23 0622 06/23/23  0105 06/22/23  2133 06/22/23  1640   *  --   --  136   POTASSIUM 4.2  --   --  4.0   CHLORIDE 100  --   --  100   CO2 15*  --   --  21*   BUN 7.4  --   --  9.0   CR 0.51  --   --  0.53   ANIONGAP 19*  --   --  15   JOSE CARLOS 8.7  --   --  9.5   *  303* 201* 222*     Most Recent 2 LFT's:  Recent Labs   Lab Test 06/23/23  0622 06/22/23  1640   AST 18 16   ALT 12 5   ALKPHOS 107* 102   BILITOTAL 0.4 0.5     Most Recent INR's and Anticoagulation Dosing History:  Anticoagulation Dose History        Latest Ref Rng & Units 6/23/2023   Recent Dosing and Labs   INR 0.85 - 1.15 1.30      Most Recent 3 Troponin's:No lab results found.  Most Recent Cholesterol Panel:No lab results found.  Most Recent 6 Bacteria Isolates From Any Culture (See EPIC Reports for Culture Details):No lab results found.  Most Recent TSH, T4 and A1c Labs:  Recent Labs   Lab Test 06/22/23  1640   A1C 6.8*       IMAGING RESULTS (Include outside hospital results)     Independently reviewed A CT facial with contrast demonstrates early abscess vs phlegmon changes along the right posterior maxilla adjacent tooth #3.

## 2023-06-24 LAB
ANION GAP SERPL CALCULATED.3IONS-SCNC: 11 MMOL/L (ref 7–15)
B-OH-BUTYR SERPL-SCNC: 0.7 MMOL/L
B-OH-BUTYR SERPL-SCNC: 1.8 MMOL/L
BACTERIA UR CULT: NO GROWTH
BUN SERPL-MCNC: 8.2 MG/DL (ref 6–20)
CALCIUM SERPL-MCNC: 9.1 MG/DL (ref 8.6–10)
CHLORIDE SERPL-SCNC: 104 MMOL/L (ref 98–107)
CREAT SERPL-MCNC: 0.49 MG/DL (ref 0.51–0.95)
DEPRECATED HCO3 PLAS-SCNC: 22 MMOL/L (ref 22–29)
ENTEROCOCCUS FAECALIS: NOT DETECTED
ENTEROCOCCUS FAECIUM: NOT DETECTED
ERYTHROCYTE [DISTWIDTH] IN BLOOD BY AUTOMATED COUNT: 12.6 % (ref 10–15)
GFR SERPL CREATININE-BSD FRML MDRD: >90 ML/MIN/1.73M2
GLUCOSE BLDC GLUCOMTR-MCNC: 116 MG/DL (ref 70–99)
GLUCOSE BLDC GLUCOMTR-MCNC: 118 MG/DL (ref 70–99)
GLUCOSE BLDC GLUCOMTR-MCNC: 121 MG/DL (ref 70–99)
GLUCOSE BLDC GLUCOMTR-MCNC: 122 MG/DL (ref 70–99)
GLUCOSE BLDC GLUCOMTR-MCNC: 123 MG/DL (ref 70–99)
GLUCOSE BLDC GLUCOMTR-MCNC: 125 MG/DL (ref 70–99)
GLUCOSE BLDC GLUCOMTR-MCNC: 134 MG/DL (ref 70–99)
GLUCOSE BLDC GLUCOMTR-MCNC: 135 MG/DL (ref 70–99)
GLUCOSE BLDC GLUCOMTR-MCNC: 138 MG/DL (ref 70–99)
GLUCOSE BLDC GLUCOMTR-MCNC: 139 MG/DL (ref 70–99)
GLUCOSE BLDC GLUCOMTR-MCNC: 142 MG/DL (ref 70–99)
GLUCOSE BLDC GLUCOMTR-MCNC: 142 MG/DL (ref 70–99)
GLUCOSE BLDC GLUCOMTR-MCNC: 143 MG/DL (ref 70–99)
GLUCOSE BLDC GLUCOMTR-MCNC: 144 MG/DL (ref 70–99)
GLUCOSE BLDC GLUCOMTR-MCNC: 145 MG/DL (ref 70–99)
GLUCOSE BLDC GLUCOMTR-MCNC: 151 MG/DL (ref 70–99)
GLUCOSE BLDC GLUCOMTR-MCNC: 154 MG/DL (ref 70–99)
GLUCOSE BLDC GLUCOMTR-MCNC: 154 MG/DL (ref 70–99)
GLUCOSE BLDC GLUCOMTR-MCNC: 155 MG/DL (ref 70–99)
GLUCOSE BLDC GLUCOMTR-MCNC: 157 MG/DL (ref 70–99)
GLUCOSE BLDC GLUCOMTR-MCNC: 167 MG/DL (ref 70–99)
GLUCOSE SERPL-MCNC: 138 MG/DL (ref 70–99)
HCT VFR BLD AUTO: 38.4 % (ref 35–47)
HGB BLD-MCNC: 12.8 G/DL (ref 11.7–15.7)
LISTERIA SPECIES (DETECTED/NOT DETECTED): NOT DETECTED
MAGNESIUM SERPL-MCNC: 2 MG/DL (ref 1.7–2.3)
MCH RBC QN AUTO: 30.8 PG (ref 26.5–33)
MCHC RBC AUTO-ENTMCNC: 33.3 G/DL (ref 31.5–36.5)
MCV RBC AUTO: 93 FL (ref 78–100)
MRSA DNA SPEC QL NAA+PROBE: NEGATIVE
PHOSPHATE SERPL-MCNC: 2.4 MG/DL (ref 2.5–4.5)
PHOSPHATE SERPL-MCNC: 2.8 MG/DL (ref 2.5–4.5)
PLATELET # BLD AUTO: 276 10E3/UL (ref 150–450)
POTASSIUM SERPL-SCNC: 3.9 MMOL/L (ref 3.4–5.3)
POTASSIUM SERPL-SCNC: 4 MMOL/L (ref 3.4–5.3)
RBC # BLD AUTO: 4.15 10E6/UL (ref 3.8–5.2)
SA TARGET DNA: POSITIVE
SODIUM SERPL-SCNC: 137 MMOL/L (ref 136–145)
STAPHYLOCOCCUS AUREUS: NOT DETECTED
STAPHYLOCOCCUS EPIDERMIDIS: DETECTED
STAPHYLOCOCCUS LUGDUNENSIS: NOT DETECTED
STREPTOCOCCUS AGALACTIAE: NOT DETECTED
STREPTOCOCCUS ANGINOSUS GROUP: NOT DETECTED
STREPTOCOCCUS PNEUMONIAE: NOT DETECTED
STREPTOCOCCUS PYOGENES: NOT DETECTED
STREPTOCOCCUS SPECIES: NOT DETECTED
WBC # BLD AUTO: 12.2 10E3/UL (ref 4–11)

## 2023-06-24 PROCEDURE — 87040 BLOOD CULTURE FOR BACTERIA: CPT | Performed by: INTERNAL MEDICINE

## 2023-06-24 PROCEDURE — 250N000009 HC RX 250

## 2023-06-24 PROCEDURE — 82962 GLUCOSE BLOOD TEST: CPT

## 2023-06-24 PROCEDURE — 250N000011 HC RX IP 250 OP 636

## 2023-06-24 PROCEDURE — 250N000009 HC RX 250: Performed by: INTERNAL MEDICINE

## 2023-06-24 PROCEDURE — 80048 BASIC METABOLIC PNL TOTAL CA: CPT | Performed by: INTERNAL MEDICINE

## 2023-06-24 PROCEDURE — 84100 ASSAY OF PHOSPHORUS: CPT | Performed by: INTERNAL MEDICINE

## 2023-06-24 PROCEDURE — 83735 ASSAY OF MAGNESIUM: CPT | Performed by: INTERNAL MEDICINE

## 2023-06-24 PROCEDURE — 258N000003 HC RX IP 258 OP 636: Performed by: INTERNAL MEDICINE

## 2023-06-24 PROCEDURE — 99233 SBSQ HOSP IP/OBS HIGH 50: CPT | Performed by: INTERNAL MEDICINE

## 2023-06-24 PROCEDURE — 0W930ZZ DRAINAGE OF ORAL CAVITY AND THROAT, OPEN APPROACH: ICD-10-PCS | Performed by: DENTIST

## 2023-06-24 PROCEDURE — 250N000011 HC RX IP 250 OP 636: Performed by: INTERNAL MEDICINE

## 2023-06-24 PROCEDURE — 85027 COMPLETE CBC AUTOMATED: CPT | Performed by: INTERNAL MEDICINE

## 2023-06-24 PROCEDURE — 84132 ASSAY OF SERUM POTASSIUM: CPT | Performed by: INTERNAL MEDICINE

## 2023-06-24 PROCEDURE — 87077 CULTURE AEROBIC IDENTIFY: CPT

## 2023-06-24 PROCEDURE — 87641 MR-STAPH DNA AMP PROBE: CPT | Performed by: INTERNAL MEDICINE

## 2023-06-24 PROCEDURE — 120N000002 HC R&B MED SURG/OB UMMC

## 2023-06-24 PROCEDURE — 99231 SBSQ HOSP IP/OBS SF/LOW 25: CPT | Mod: GC | Performed by: INTERNAL MEDICINE

## 2023-06-24 PROCEDURE — 250N000013 HC RX MED GY IP 250 OP 250 PS 637: Performed by: INTERNAL MEDICINE

## 2023-06-24 PROCEDURE — 999N000127 HC STATISTIC PERIPHERAL IV START W US GUIDANCE

## 2023-06-24 PROCEDURE — 87070 CULTURE OTHR SPECIMN AEROBIC: CPT

## 2023-06-24 PROCEDURE — 36415 COLL VENOUS BLD VENIPUNCTURE: CPT | Performed by: INTERNAL MEDICINE

## 2023-06-24 PROCEDURE — 82010 KETONE BODYS QUAN: CPT | Performed by: INTERNAL MEDICINE

## 2023-06-24 RX ORDER — LIDOCAINE HYDROCHLORIDE AND EPINEPHRINE 10; 10 MG/ML; UG/ML
INJECTION, SOLUTION INFILTRATION; PERINEURAL
Status: COMPLETED
Start: 2023-06-24 | End: 2023-06-24

## 2023-06-24 RX ORDER — FLURBIPROFEN 100 MG
1 TABLET ORAL
Status: ON HOLD | COMMUNITY
Start: 2023-06-21 | End: 2023-06-30

## 2023-06-24 RX ORDER — OXYCODONE HYDROCHLORIDE 5 MG/1
5 TABLET ORAL EVERY 4 HOURS PRN
Status: DISCONTINUED | OUTPATIENT
Start: 2023-06-24 | End: 2023-07-01 | Stop reason: HOSPADM

## 2023-06-24 RX ORDER — LIDOCAINE HYDROCHLORIDE AND EPINEPHRINE 10; 10 MG/ML; UG/ML
1 INJECTION, SOLUTION INFILTRATION; PERINEURAL ONCE
Status: COMPLETED | OUTPATIENT
Start: 2023-06-24 | End: 2023-06-24

## 2023-06-24 RX ORDER — KETOROLAC TROMETHAMINE 30 MG/ML
30 INJECTION, SOLUTION INTRAMUSCULAR; INTRAVENOUS EVERY 6 HOURS PRN
Status: ACTIVE | OUTPATIENT
Start: 2023-06-24 | End: 2023-06-29

## 2023-06-24 RX ORDER — INSULIN PUMP CONTROLLER
EACH MISCELLANEOUS
COMMUNITY
Start: 2023-05-11

## 2023-06-24 RX ORDER — BLOOD-GLUCOSE SENSOR
EACH MISCELLANEOUS
COMMUNITY
Start: 2023-06-06

## 2023-06-24 RX ADMIN — LIDOCAINE HYDROCHLORIDE AND EPINEPHRINE 1 ML: 10; 10 INJECTION, SOLUTION INFILTRATION; PERINEURAL at 06:53

## 2023-06-24 RX ADMIN — HUMAN INSULIN 3 UNITS/HR: 100 INJECTION, SOLUTION SUBCUTANEOUS at 07:01

## 2023-06-24 RX ADMIN — VANCOMYCIN HYDROCHLORIDE 2500 MG: 1 INJECTION, POWDER, LYOPHILIZED, FOR SOLUTION INTRAVENOUS at 15:39

## 2023-06-24 RX ADMIN — LIDOCAINE HYDROCHLORIDE,EPINEPHRINE BITARTRATE 1 ML: 10; .01 INJECTION, SOLUTION INFILTRATION; PERINEURAL at 06:53

## 2023-06-24 RX ADMIN — DIPHENHYDRAMINE HYDROCHLORIDE 50 MG: 50 INJECTION, SOLUTION INTRAMUSCULAR; INTRAVENOUS at 05:56

## 2023-06-24 RX ADMIN — METRONIDAZOLE 500 MG: 5 INJECTION, SOLUTION INTRAVENOUS at 20:02

## 2023-06-24 RX ADMIN — POTASSIUM PHOSPHATE, MONOBASIC POTASSIUM PHOSPHATE, DIBASIC 15 MMOL: 224; 236 INJECTION, SOLUTION, CONCENTRATE INTRAVENOUS at 02:25

## 2023-06-24 RX ADMIN — LEVOFLOXACIN 750 MG: 5 INJECTION, SOLUTION INTRAVENOUS at 20:01

## 2023-06-24 RX ADMIN — HYDROMORPHONE HYDROCHLORIDE 0.5 MG: 1 INJECTION, SOLUTION INTRAMUSCULAR; INTRAVENOUS; SUBCUTANEOUS at 05:58

## 2023-06-24 RX ADMIN — ACETAMINOPHEN 975 MG: 325 TABLET, FILM COATED ORAL at 16:11

## 2023-06-24 RX ADMIN — INSULIN GLARGINE 40 UNITS: 100 INJECTION, SOLUTION SUBCUTANEOUS at 20:58

## 2023-06-24 RX ADMIN — METRONIDAZOLE 500 MG: 5 INJECTION, SOLUTION INTRAVENOUS at 07:01

## 2023-06-24 RX ADMIN — HUMAN INSULIN 0.5 UNITS/HR: 100 INJECTION, SOLUTION SUBCUTANEOUS at 16:16

## 2023-06-24 RX ADMIN — ACETAMINOPHEN 975 MG: 325 TABLET, FILM COATED ORAL at 04:48

## 2023-06-24 RX ADMIN — HYDROMORPHONE HYDROCHLORIDE 0.5 MG: 1 INJECTION, SOLUTION INTRAMUSCULAR; INTRAVENOUS; SUBCUTANEOUS at 13:18

## 2023-06-24 RX ADMIN — HUMAN INSULIN 0.2 UNITS/HR: 100 INJECTION, SOLUTION SUBCUTANEOUS at 17:24

## 2023-06-24 ASSESSMENT — ACTIVITIES OF DAILY LIVING (ADL)
ADLS_ACUITY_SCORE: 39

## 2023-06-24 NOTE — PLAN OF CARE
"BP (!) 154/68 (BP Location: Right arm, Patient Position: Semi-Nails's, Cuff Size: Adult Large)   Pulse 86   Temp 99.5  F (37.5  C) (Oral)   Resp 18   Ht 1.778 m (5' 10\")   Wt 131.5 kg (290 lb)   SpO2 98%   BMI 41.61 kg/m      Goal Outcome Evaluation:    [id number]    Admitted for facial swelling and pain 2/2 dental work recently. Swelling and pain is getting better per pt report. Elevated BP, didn't meet parameter for team notification. VS q8h. Tolerable pain on scheduled Tylenol, no prn pain med needed. On two iv abx. On Insulin drip for DKA (Ketone 1.9), BS q1h. Phos replaced via IV. Oral surgery team is here for I&D.   "

## 2023-06-24 NOTE — MEDICATION SCRIBE - ADMISSION MEDICATION HISTORY
Medication Scribe Admission Medication History    Admission medication history is complete. The information provided in this note is only as accurate as the sources available at the time of the update.    Medication reconciliation/reorder completed by provider prior to medication history? No    Information Source(s): Patient, Family member and CareEverywhere/SureScripts via in-person    Pertinent Information: Patient could not communicate well because of pain in face/jaw.     Changes made to PTA medication list:    Added: Insulin listpro 100 unit/mL, [place]-vitamins, Gerard 3 sensor freestyles, flurbiprofen 100 mg, Omnipod dash (gen 5)    Deleted: None    Changed: None    Medication Affordability:  Not including over the counter (OTC) medications, was there a time in the past 3 months when you did not take your medications as prescribed because of cost?: Unable to Assess    Allergies reviewed with patient and updates made in EHR: yes    Medication History Completed By: Izzy Patel 6/24/2023 12:59 PM    Prior to Admission medications    Medication Sig Last Dose Taking? Auth Provider Long Term End Date   Continuous Blood Gluc Sensor (FREESTYLE GERARD 3 SENSOR) MISC    Reported, Patient     flurbiprofen (ANSAID) 100 MG tablet Take 1 tablet by mouth 3 times daily   Reported, Patient Yes    Insulin Disposable Pump (OMNIPOD DASH PODS, GEN 4,) MISC    Reported, Patient     Multiple Vitamin (MULTIVITAMIN ADULT PO) Take 1 tablet by mouth daily   Reported, Patient

## 2023-06-24 NOTE — PROGRESS NOTES
"Vital signs:  Temp: 99.1  F (37.3  C) Temp src: Axillary BP: (!) 150/67 Pulse: 87   Resp: 18 SpO2: 97 % O2 Device: None (Room air)   Height: 177.8 cm (5' 10\") Weight: 131.5 kg (290 lb)     Admitted for facial swelling and pain 2/2 dental work recently.  Tolerable pain on scheduled Tylenol, no prn pain med needed. On two iv abx. On Insulin drip for DKA (Ketone 1.8), BS q1h. Oral surgery team performed I & D this am. Continue with insulin gtt and IV antibiotics.          "

## 2023-06-24 NOTE — ED NOTES
Bed: ED25  Expected date:   Expected time:   Means of arrival:   Comments:  HOLD FOR PATIENT IN ROOM 1

## 2023-06-24 NOTE — PROGRESS NOTES
ORAL & MAXILLOFACIAL SURGERY   PROGRESS NOTE  Glenny Campos,  MRN: [id number],  : 1965           ASSESSMENT:  57 year old female with pmh significant for T2DM (found to be in DKA on admission), obesity, HLD, biliary colic presents with sepsis 2/2 to a right canine space abscess and worsening facial cellulitis and sinusitis following a dental procedure (likley apicoectomy per patient description) completed by an endodontist on  of her right maxillary 1st molar. She is now POD0 s/p I&D of a right canine space abscess with placement of a penrose drain (x1).     PLAN  1. Bedside I&D right canine space abscess under local anesthesia this AM (see procedure note for details)  2. Continue levo and flagyl given allergy to penicillin; surgical cultures obtained, will follow up on results  3. Endocrinology on board, appreciate recs   4. Warm compresses to right face, avoid ice  6.  HOB > 30  7. Follow up for drain removal will be coordinated outpatient for 1 week pending hospital course (address below)  8. Remainder of care per primary  9. OMFS will continue to follow peripherally     Please contact the OMFS resident on-call with questions or concerns.     Discussed with chief resident and staff.     Evelio Thomas DDS  Oral & Maxillofacial Surgery, Intern    Please contact the OMFS resident on-call with questions or concerns.  ____________________________________      SUBJECTIVE:  NAD, ADELIA overnight. Patient has not been able to move to the floor and remains in the ED. States the swelling feels about the same and the pain is a 8/10. Denies any purulent drainage or foul taste in her mouth. Continues to deny odynophagia, dysphagia, dyspnea, or dysphonia.    PHYSICAL EXAM:   Vitals:    23 0849 23 1143 23 1634 23 0000   BP: 134/71 (!) 141/73 (!) 160/74 (!) 154/68   BP Location: Right arm Right arm Right arm Right arm   Patient Position:   Semi-Nails's Semi-Nails's   Cuff Size:   Adult  Large Adult Large   Pulse:  91 88 86   Resp: 22 22 20 18   Temp: 98.4  F (36.9  C) 99.2  F (37.3  C) 99.1  F (37.3  C) 99.5  F (37.5  C)   TempSrc: Oral Oral Oral Oral   SpO2: 98% 97% 97% 98%   Weight:       Height:          GEN: WD/WN female, NAD  HEENT: NC/AT, EOMI, PERRL, moderate indurated swelling of the right infraorbital area, inferior border of the mandible is palpable bilaterally, no submandibular or submental swelling, fluctuant swelling along the right maxillary vestibule, no vestibular swelling of the mandible, FOM is soft and nontender, uvula is midline, CAROL 30mm spontaneously, can be manipulated to 40mm under distress, uvula is midline, no purulent drainage,   CV: RRR, no M/G/R, warm and well-perfused  PULM: CTAB, breathing comfortably on room air  GI: Soft, ND/NT  MSK: SHUKLA, no peripheral extremity edema  NEURO: AAOx4, CN II-XII intact bilaterally  PSYCH: Appropriate mood and affect     LABS:   CBC RESULTS:   Recent Labs   Lab Test 06/23/23  0622   WBC 15.1*   RBC 4.43   HGB 13.8   HCT 40.6   MCV 92   MCH 31.2   MCHC 34.0   RDW 12.4         Last Comprehensive Metabolic Panel:  Sodium   Date Value Ref Range Status   06/23/2023 138 136 - 145 mmol/L Final     Potassium   Date Value Ref Range Status   06/23/2023 3.6 3.4 - 5.3 mmol/L Final     Chloride   Date Value Ref Range Status   06/23/2023 105 98 - 107 mmol/L Final     Carbon Dioxide (CO2)   Date Value Ref Range Status   06/23/2023 23 22 - 29 mmol/L Final     Anion Gap   Date Value Ref Range Status   06/23/2023 10 7 - 15 mmol/L Final     GLUCOSE BY METER POCT   Date Value Ref Range Status   06/24/2023 142 (H) 70 - 99 mg/dL Final     Urea Nitrogen   Date Value Ref Range Status   06/23/2023 7.8 6.0 - 20.0 mg/dL Final     Creatinine   Date Value Ref Range Status   06/23/2023 0.51 0.51 - 0.95 mg/dL Final     GFR Estimate   Date Value Ref Range Status   06/23/2023 >90 >60 mL/min/1.73m2 Final     Calcium   Date Value Ref Range Status   06/23/2023 9.1  8.6 - 10.0 mg/dL Final        RADIOLOGY:  No new imaging

## 2023-06-24 NOTE — PROCEDURES
PROCEDURE: Bedside incision and drainage of a right canine space abscess and placement of a penrose drain (x1)    INDICATION: 57 year old female with pmh significant for T2DM, obesity, HLD, biliary colic presents with sepsis 2/2 to a right canine space abscess and worsening facial cellulitis and sinusitis following a dental procedure (neriley apicoectomy per patient description) completed by an endodontist on 06/20 of her right maxillary 1st molar. Patient states that following the procedure her face became significantly swollen and painful. She reports to putting ice on the swelling which made it worse. She ultimately presented to the ED for evaluation. A CT facial with contrast demonstrates early abscess vs phlegmon changes along the right posterior maxilla adjacent tooth #3. She currently denies dysphagia, odynophagia, dyspnea, or dysphonia. She is tolerating her secretions well. Otherwise denies f/c/n/v or any constitutional symptoms.    PERFORMED BY: Evelio Thomas DDS     Informed consent was obtained from the patient prior to beginning this procedure. All risks, benefits, alternatives and medical necessity were communicated. Time was given for questions to be asked regarding the procedure, all of which were answered.    A surgical time-out was completed by the entire surgical team verifying correct patient, procedure, site, positioning, and availability of necessary imaging and equipment. Prior to beginning the procedure, the patient was administered 0.5mg dilaudid and 50mg benadryl for pain control. The patient was anesthetized with 10 cc of 1% lidocaine.  An incision was made with a #15 scalpel blade at the head of fluctuance within the right maxillary vestibule.  Purulence was immediately encountered.  A culture swab was collected and sent.  A curved hemostat was used to bluntly dissect in a subperiosteal plane and into the cutaneous tissues.  Loculations were broken up.  Approximately 5 cc of purulence was  drained from the site.  The site was profusely irrigated with copious amounts of normal saline.  A 1/2 inch Penrose drain was placed into the right canine space and secured with a single 3-0 silk suture.  The bleeding was controlled and resolved with direct pressure to the wound.  The patient tolerated the procedure well.  A warm compress was placed on the patient's face.

## 2023-06-24 NOTE — PROGRESS NOTES
IDS inpatient progress note      Assessment:   Glenny Campos is a 57 year old women with past medical history of Type 2 diabetes mellitus (on insulin pump and previously followed with endo) (complicated by retinopathy, neuropathy, prior left toe amputations), obesity, HLD, biliary colic who presents with sepsis secondary to dental infection, sinusitis and facial cellulitis (right sided).    1.  Type 2 diabetes, suboptimally controlled with retinopathy, amputation, neuropathy hemoglobin A1c 6.8 on this admission  2.  Impending diabetic ketoacidosis  VBG ph=7.37, AG=20 and Co2=15  3.  Infection/stress  induced hyperglycemia  4. Elevated BMI  Continues to have high requirement and insulin drip.  Rate mostly over the night 3 units/h.  Likely increased requirement due to the active infection.  Yesterday she received 10 units of Lantus in the morning and 20 units at the bedtime.    Plan:     -Continue with non-DKA insulin drip for today.   -Increase Lantus from 20 units to 40 units starting from tonight.   -Increase NovoLog carb coverage from 1: 15 g CHO to 1: 10 g CHO with meals and snacks. (Did not start to eat yet)   -Hold Novolog medium sliding scale while on the insulin drip.   -BG monitoring every 2 hours with adjusting the rate of the drip accordingly.   -hypoglycemia protocol   -recommend diet per primary care with carb counting protocol   -diabetes education needs will be assessed closer to discharge   -on discharge, will recommend outpatient follow up with [facility] endocrinology and CDE    History of Present Illness:   Glenny Campos is a 57 year old female with past medical history of Type 2 diabetes mellitus (on insulin pump and previously followed with endo) (complicated by retinopathy, neuropathy, prior left toe amputations), obesity, HLD, biliary colic who presents with sepsis secondary to dental infection, sinusitis and facial cellulitis (right sided).    Diabetes Focus:  Glenny Campos  presents with Right facial cellulitis post dental procedure( root canal) this past Tuesday.  She has not been able to eat since Tuesday.  She has had pain and low grade fevers.  She has had some vomiting she thinks from the pain medicine.  The nausea and vomiting were worse overnight. She is urinating frequently she thinks from the IV fluids and reports thirst.  Cannot really tell me if this was the case prior to admission.  She says she always runs higher BG overnight.  Her BG in the am have been 150 and then the rest of the day they are 200's.  She cannot tell me if she was running in basal 1 or basal 2.  She says she removed her pod-- she thinks on Wednesday night and did not replace it because she thought she would need an MRI and she thought she needed to come in.  Her BG were initially in the low 200's upon admission and then this morning up to the 300's.  She received Lantus 10 last night and Lantus 10 again this morning with medium sliding scale.  Her presenting Co2=21 and then Co2= 17 this am.  Her AG was 15 and AG 20 this am.  Her ketone this am=4.00 and her yfww=878.   Her VBG has ph=7.37. She has good mentation although teary and ill appearing.       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  With infection of  Third toe, diabetic retinopathy, diabetic neuropathy  Prior to Admission Diabetes Regimen:    Did not tolerate metformin  Gerard 3  Omnipod Dash ( has been wearing this since 10/13/2022)    BG monitor: Gerard 3  Frequency of checks: multiple times daily  Omnipod Dash (the following settings were confirmed from the PDM)  Basal 1                       (these are the settings basal 1 which she was using prior to the admission)  00:00-11:29-->  1.05  11:30 am to 17:59-->  1.0  18:00 to 23:59-->  1.05  Total basal=24.85  ISF=1:45  Insulin to carb ratio: 1:15  "  Target Glucose=100-110  Total Gluc 4 hours    Inactive Basal 2 (switched from the settings to basal 1 prior to the admission)  00:00-06:59--> 1.5  07:00-16:59--> 1  17:00--23:59-->2  Total basal 34.5      Omnipod Dash setting per Epic in 11/2/22    Basal 00:00-09:59 am-->  1.5  10:00 am to 21:59--> 1.0  22:00 to 23:59--> 1.5    ISF=1:55  1:18 carb ratio  Target=110-120    Diet: 2 meals but has not eaten since Tuesday  Usual BG control PTA:  with A1c 6.8 on 6/22/2023  History of DKA:  Less likely DKA but just ketosis  Able to Detect Hypoglycemia: yes she gets sweaty and fatigue  Usual Diabetes Care Provider: Lori Leone MD   Department of Diabetes and Endocrinology   [facility]     Primary Care Provider: No primary care provider on file.  Factors Impacting IP Glucose Control: Cellulitis of the right face, not able to eat well due to swelling, prefers supplements rather then medication  Current Diet: full liquid diet        Physical Exam   BP (!) 152/73   Pulse 91   Temp 99.1  F (37.3  C) (Axillary)   Resp 18   Ht 1.778 m (5' 10\")   Wt 131.5 kg (290 lb)   SpO2 95%   BMI 41.61 kg/m    General: awake, alert appears ill,  present at side   HEENT:  Facial swelling.  Lungs: unlabored respiration, no cough  MSK:  moves all extremities,   Mental status:  alert, oriented to self, place, date  Psych:  Not able to talk because of the bandage on her face however she was able to communicate with us by writing.    Most Recent Laboratory Tests:  Recent Labs   Lab 06/24/23  0601   HGB 12.8     Recent Labs   Lab 06/22/23  1640   A1C 6.8*     Recent Labs   Lab 06/24/23  0601   CR 0.49*     Recent Labs   Lab 06/24/23  0901 06/24/23  0805 06/24/23  0701 06/24/23  0601 06/24/23  0549 06/24/23  0419   * 154* 167* 138* 142* 154*     ROUTINE IP LABS (Last four results)  BMP  Recent Labs   Lab 06/24/23  0901 06/24/23  0805 06/24/23  0701 06/24/23  0601 06/23/23  2156 06/23/23 2049 " 06/23/23  1527 06/23/23  1422 06/23/23  1134 06/23/23  0918   NA  --   --   --  137  --  138  --  137  --  136   POTASSIUM  --   --   --  3.9  --  3.6  --  3.8  --  4.3   CHLORIDE  --   --   --  104  --  105  --  102  --  99   JOSE CARLOS  --   --   --  9.1  --  9.1  --  9.3  --  9.2   CO2  --   --   --  22  --  23  --  20*  --  17*   BUN  --   --   --  8.2  --  7.8  --  7.9  --  7.7   CR  --   --   --  0.49*  --  0.51  --  0.50*  --  0.57   * 154* 167* 138*   < > 178*   < > 230*   < > 320*    < > = values in this interval not displayed.     CBC  Recent Labs   Lab 06/24/23  0601 06/23/23  0622 06/22/23  1640   WBC 12.2* 15.1* 12.2*   RBC 4.15 4.43 4.66   HGB 12.8 13.8 14.6   HCT 38.4 40.6 43.1   MCV 93 92 93   MCH 30.8 31.2 31.3   MCHC 33.3 34.0 33.9   RDW 12.6 12.4 12.1    251 239     INR  Recent Labs   Lab 06/23/23  0622   INR 1.30*         trend or trigger automated decision support.     C-Peptide, Serum  Order: [id number]   Ref Range & Units 10 mo ago   C-Peptide 0.5 - 3.3 ng/mL 1.2    Comment: INTERPRETIVE INFORMATION: Serum, C-Peptide     Reference Interval applies to fasting specimens. To convert to   nmol/L, multiply by 0.33   Performed By: [facility]   [address] [place] [id number]   : Gurvinder Haro MD, PhD   Resulting Agency  [facility]     Specimen Collected: 08/25/22  8:26 AM    Performed by: [facility] Last Resulted: 08/27/22  7:49 PM   Received From: [facility]  Result Received: 06/22/23  3:12 PM         Italo Franco     Endocrinology diabetes and metabolism  fellow   Pager number: [phone]       I have seen and examined the patient, reviewed records, reviewed and edited the fellow's note.  I agree with the plan of care.    Preet Simmons MD   Division of Diabetes, Endocrinology and Metabolism  Department of Medicine

## 2023-06-24 NOTE — PROGRESS NOTES
[facility]    Medicine Progress Note - Hospitalist Service, GOLD TEAM 10    Date of Admission:  6/22/2023    Assessment & Plan   58 yo female with history of DM2 on insulin pump complicated by retinopathy, peripheral neuropathy, and prior left toe amputations and chronic left foot wound, obesity, HLD admitted for sepsis 2/2 dental infection and R facial cellulitis. Hospital course complicated by DKA 2/2 infection.     Today:  -Blood cultures 6/23 with staph epi in 2/4   -Add vancomycin   -Recheck blood cultures today   -Likely ID consult tomorrow   -Discussed with endocrine, continue insulin drip for today, increase insulin glargine, hopefully discontinue insulin drip tomorrow   -Recheck K, Phos, and Ketones this afternoon   -Add PRN toradol for pain     Sepsis 2/2 R Canine Space Abscess, Associated Facial Cellulitis and Sinusitis   S/p I&D of a right canine space abscess with placement of a penrose drain (x1) 6/24   Had root canal of R maxillary 1st molar on 6/20 as outpatient which led to above complication. Presented to ED with worsening facial pain and swelling, had leukocytosis and tachycardia at admission.   -Antibiotics (has multiple drug allergies)    -Levofloxacin 6/22 -    -Metronidazole 6/22 -    -Vancomycin 6/24 -   -OMFS consulted, appreciate assistance    -s/p I&D of a right canine space abscess with placement of a penrose drain (x1).   -Warm compress, avoid ice, HOB >30   -Dental team consulted, appreciate assistance    -Recommend 0.12% chlorhexidine glutamate mouthrinse BID  -Pain control: Scheduled acetaminophen, PRN oral oxycodone, PRN IV dilaudid, PRN toradol      Staph Epi + Blood cultures 2/4   Blood cultures drawn 6/23 as a part of sepsis/DKA work-up. 6/24 returned positive for staph Epi (methicillin sensitive). Unclear if represents true bacteremia vs contamination.   -Continue vancomycin 6/24 -   -Follow up repeat blood cultures 6/24 - pending  "  -Check MRSA nares     Insulin Dependent DM2  Likely DKA 2/2 infection as above, improving DKA   Typically has a continuous glucose monitor and an insulin pump and per notes and per patient's pump she has about 1 unit of humalog per hour. Has had her pump off for about 12 hours prior to admission.   On 6/23 AM, labs showed DKA although pH on VBG was 7.37 (?).   -Endocrine consulted, appreciate assistance   -6/23 - started insulin drip with DKA protocol in AM, with closing gap transitioned to non-DKA insulin drip in evening. Lantus given per endocrine recs.   -6/24 - continued on insulin drip + lantus per endocrine recs/assistance   -Trend BMP and ketones    Left Third Toe Wound   With h/o osteomyelitis of prior amputated toes. X-ray without obvious signs of osteo.   -Podiatry/Orthopedics consulted, appreciate assistance    -No evidence of infections   -No urgent interventions   -Needs ongoing podiatry care  -WOCN Consulted    -Wound cares per their orders   -In addition to podiatry consult, recommend vascular consult. Will place consult pending remainder of hospital course.       Diet: Combination Diet Full Liquid    DVT Prophylaxis: Pneumatic Compression Devices and Ambulate every shift  Brenner Catheter: Not present  Lines: None     Cardiac Monitoring: ACTIVE order. Indication: DKA  Code Status: Full Code      Clinically Significant Risk Factors             # Anion Gap Metabolic Acidosis: Highest Anion Gap = 20 mmol/L in last 2 days, will monitor and treat as appropriate   # Coagulation Defect: INR = 1.30 (Ref range: 0.85 - 1.15) and/or PTT = N/A, will monitor for bleeding          # DMII: A1C = 6.8 % (Ref range: <5.7 %) within past 6 months, PRESENT ON ADMISSION  # Severe Obesity: Estimated body mass index is 41.61 kg/m  as calculated from the following:    Height as of this encounter: 1.778 m (5' 10\").    Weight as of this encounter: 131.5 kg (290 lb)., PRESENT ON ADMISSION          Disposition Plan     Expected " Discharge Date: 06/24/2023                  Jhoana Estrella DO  Hospitalist Service, GOLD TEAM 10  [facility]  Securely message with Notegraph[employer] (more info)  Text page via [employer] Paging/Directory   See signed in provider for up to date coverage information  ______________________________________________________________________    Interval History   No overnight events reported.  Had I&D of dental abscess at bedside early this morning by OMFS. Pain is stable since. Cannot talk due to dressing on her face. No nausea or vomiting today.     Physical Exam   Vital Signs: Temp: 99.1  F (37.3  C) Temp src: Axillary BP: (!) 152/73 Pulse: 91   Resp: 18 SpO2: 95 % O2 Device: None (Room air)    Weight: 290 lbs 0 oz    Constitutional: no apparent distress, pleasant and cooperative   Cardiovascular: regular rate and rhythm, normal S1 and S2  Respiratory: clear to auscultation bilaterally, no wheezing, rales, or rhonchi, normal work of breathing   GI: abdomen soft, non tender, non distended, bowel sounds present   Neuro: alert and oriented, no gross focal deficits noted   Skin: Right upper cheek, upper lip, and lower eyelid swollen, erythematous, and tender, appears somewhat worse than yesterday     Medical Decision Making       50 MINUTES SPENT BY ME on the date of service doing chart review, history, exam, documentation & further activities per the note.  MANAGEMENT DISCUSSED with the following over the past 24 hours: OMFS, endocrinology       Data     I have personally reviewed the following data over the past 24 hrs:    12.2 (H)  \   12.8   / 276     137 104 8.2 /  121 (H)   3.9 22 0.49 (L) \       Imaging results reviewed over the past 24 hrs:   No results found for this or any previous visit (from the past 24 hour(s)).

## 2023-06-24 NOTE — PHARMACY-VANCOMYCIN DOSING SERVICE
Pharmacy Vancomycin Initial Note  Date of Service 2023  Patient's  1965  57 year old, female    Indication: Bacteremia    Current estimated CrCl = Estimated Creatinine Clearance: 191.9 mL/min (A) (based on SCr of 0.49 mg/dL (L)).    Creatinine for last 3 days  2023:  4:40 PM Creatinine 0.53 mg/dL  2023:  6:22 AM Creatinine 0.51 mg/dL;  9:18 AM Creatinine 0.57 mg/dL;  2:22 PM Creatinine 0.50 mg/dL;  8:49 PM Creatinine 0.51 mg/dL  2023:  6:01 AM Creatinine 0.49 mg/dL    Recent Vancomycin Level(s) for last 3 days  No results found for requested labs within last 3 days.      Vancomycin IV Administrations (past 72 hours)      No vancomycin orders with administrations in past 72 hours.                Nephrotoxins and other renal medications (From now, onward)    Start     Dose/Rate Route Frequency Ordered Stop    23 0400  vancomycin (VANCOCIN) 1,500 mg in 0.9% NaCl 250 mL intermittent infusion         1,500 mg  over 90 Minutes Intravenous EVERY 12 HOURS 23 1437      23 1440  vancomycin (VANCOCIN) 2,500 mg in sodium chloride 0.9 % 500 mL intermittent infusion         2,500 mg  over 120 Minutes Intravenous ONCE 23 1437      23 1428  ketorolac (TORADOL) injection 30 mg         30 mg Intravenous EVERY 6 HOURS PRN 23 1428 23 1427          Contrast Orders - past 72 hours (72h ago, onward)    Start     Dose/Rate Route Frequency Stop    23 1835  iopamidol (ISOVUE-370) solution 75 mL         75 mL Intravenous ONCE 23 1835          [employer] Prediction of Planned Initial Vancomycin Regimen  Loading dose: 2500 mg at 14:36 2023.  Regimen: 1500 mg IV every 12 hours.  Start time: 14:36 on 2023  Exposure target: AUC24 (range)400-600 mg/L.hr   AUC24,ss: 538 mg/L.hr  Probability of AUC24 > 400: 71 %  Ctrough,ss: 13.6 mg/L  Probability of Ctrough,ss > 20: 33 %  Probability of nephrotoxicity (Lodise COLTEN ): 9 %        Plan:  1. Start  vancomycin  2500 mg IV once then 1500 mg Q12H.   2. Vancomycin monitoring method: AUC  3. Vancomycin therapeutic monitoring goal: 400-600 mg*h/L  4. Pharmacy will check vancomycin levels as appropriate in 1-3 Days.    5. Serum creatinine levels will be ordered daily for the first week of therapy and at least twice weekly for subsequent weeks.      Souleymane Becerra RPH

## 2023-06-24 NOTE — ED NOTES
Evelio Naidu DDS at bedside with Sarahi Gamboa DDS. Incision and drainage performed by DDS. Patient tolerated the procedure fairly. Sample collected. 1 stitch placed to right upper buccal area. DDS recommended warm compress to right side of face.

## 2023-06-25 LAB
ALBUMIN SERPL BCG-MCNC: 3.2 G/DL (ref 3.5–5.2)
ALP SERPL-CCNC: 94 U/L (ref 35–104)
ALT SERPL W P-5'-P-CCNC: 16 U/L (ref 0–50)
ANION GAP SERPL CALCULATED.3IONS-SCNC: 12 MMOL/L (ref 7–15)
AST SERPL W P-5'-P-CCNC: 30 U/L (ref 0–45)
B-OH-BUTYR SERPL-SCNC: 1.4 MMOL/L
BILIRUB SERPL-MCNC: 0.3 MG/DL
BUN SERPL-MCNC: 6.1 MG/DL (ref 6–20)
CALCIUM SERPL-MCNC: 8.7 MG/DL (ref 8.6–10)
CHLORIDE SERPL-SCNC: 105 MMOL/L (ref 98–107)
CREAT SERPL-MCNC: 0.44 MG/DL (ref 0.51–0.95)
DEPRECATED HCO3 PLAS-SCNC: 22 MMOL/L (ref 22–29)
GFR SERPL CREATININE-BSD FRML MDRD: >90 ML/MIN/1.73M2
GLUCOSE BLDC GLUCOMTR-MCNC: 100 MG/DL (ref 70–99)
GLUCOSE BLDC GLUCOMTR-MCNC: 102 MG/DL (ref 70–99)
GLUCOSE BLDC GLUCOMTR-MCNC: 107 MG/DL (ref 70–99)
GLUCOSE BLDC GLUCOMTR-MCNC: 111 MG/DL (ref 70–99)
GLUCOSE BLDC GLUCOMTR-MCNC: 113 MG/DL (ref 70–99)
GLUCOSE BLDC GLUCOMTR-MCNC: 116 MG/DL (ref 70–99)
GLUCOSE BLDC GLUCOMTR-MCNC: 119 MG/DL (ref 70–99)
GLUCOSE BLDC GLUCOMTR-MCNC: 122 MG/DL (ref 70–99)
GLUCOSE BLDC GLUCOMTR-MCNC: 132 MG/DL (ref 70–99)
GLUCOSE BLDC GLUCOMTR-MCNC: 135 MG/DL (ref 70–99)
GLUCOSE BLDC GLUCOMTR-MCNC: 97 MG/DL (ref 70–99)
GLUCOSE BLDC GLUCOMTR-MCNC: 98 MG/DL (ref 70–99)
GLUCOSE SERPL-MCNC: 102 MG/DL (ref 70–99)
PHOSPHATE SERPL-MCNC: 3.3 MG/DL (ref 2.5–4.5)
POTASSIUM SERPL-SCNC: 3.5 MMOL/L (ref 3.4–5.3)
PROT SERPL-MCNC: 6.7 G/DL (ref 6.4–8.3)
SODIUM SERPL-SCNC: 139 MMOL/L (ref 136–145)

## 2023-06-25 PROCEDURE — 82962 GLUCOSE BLOOD TEST: CPT

## 2023-06-25 PROCEDURE — 99233 SBSQ HOSP IP/OBS HIGH 50: CPT | Performed by: INTERNAL MEDICINE

## 2023-06-25 PROCEDURE — 99418 PROLNG IP/OBS E/M EA 15 MIN: CPT | Performed by: STUDENT IN AN ORGANIZED HEALTH CARE EDUCATION/TRAINING PROGRAM

## 2023-06-25 PROCEDURE — 36415 COLL VENOUS BLD VENIPUNCTURE: CPT | Performed by: INTERNAL MEDICINE

## 2023-06-25 PROCEDURE — 250N000011 HC RX IP 250 OP 636: Mod: JZ | Performed by: INTERNAL MEDICINE

## 2023-06-25 PROCEDURE — C9113 INJ PANTOPRAZOLE SODIUM, VIA: HCPCS | Mod: JZ | Performed by: INTERNAL MEDICINE

## 2023-06-25 PROCEDURE — 250N000012 HC RX MED GY IP 250 OP 636 PS 637: Performed by: STUDENT IN AN ORGANIZED HEALTH CARE EDUCATION/TRAINING PROGRAM

## 2023-06-25 PROCEDURE — 84100 ASSAY OF PHOSPHORUS: CPT | Performed by: INTERNAL MEDICINE

## 2023-06-25 PROCEDURE — 99223 1ST HOSP IP/OBS HIGH 75: CPT | Performed by: STUDENT IN AN ORGANIZED HEALTH CARE EDUCATION/TRAINING PROGRAM

## 2023-06-25 PROCEDURE — 80053 COMPREHEN METABOLIC PANEL: CPT | Performed by: INTERNAL MEDICINE

## 2023-06-25 PROCEDURE — 250N000013 HC RX MED GY IP 250 OP 250 PS 637: Performed by: INTERNAL MEDICINE

## 2023-06-25 PROCEDURE — 99231 SBSQ HOSP IP/OBS SF/LOW 25: CPT | Mod: GC | Performed by: INTERNAL MEDICINE

## 2023-06-25 PROCEDURE — 258N000003 HC RX IP 258 OP 636: Performed by: INTERNAL MEDICINE

## 2023-06-25 PROCEDURE — 250N000013 HC RX MED GY IP 250 OP 250 PS 637

## 2023-06-25 PROCEDURE — 120N000002 HC R&B MED SURG/OB UMMC

## 2023-06-25 PROCEDURE — 87040 BLOOD CULTURE FOR BACTERIA: CPT | Performed by: INTERNAL MEDICINE

## 2023-06-25 PROCEDURE — 82010 KETONE BODYS QUAN: CPT | Performed by: INTERNAL MEDICINE

## 2023-06-25 RX ADMIN — ACETAMINOPHEN 975 MG: 325 TABLET, FILM COATED ORAL at 12:09

## 2023-06-25 RX ADMIN — LEVOFLOXACIN 750 MG: 5 INJECTION, SOLUTION INTRAVENOUS at 19:56

## 2023-06-25 RX ADMIN — ACETAMINOPHEN 975 MG: 325 TABLET, FILM COATED ORAL at 01:33

## 2023-06-25 RX ADMIN — INSULIN GLARGINE 40 UNITS: 100 INJECTION, SOLUTION SUBCUTANEOUS at 22:15

## 2023-06-25 RX ADMIN — VANCOMYCIN HYDROCHLORIDE 1500 MG: 10 INJECTION, POWDER, LYOPHILIZED, FOR SOLUTION INTRAVENOUS at 16:17

## 2023-06-25 RX ADMIN — CHLORHEXIDINE GLUCONATE 15 ML: 1.2 SOLUTION ORAL at 19:56

## 2023-06-25 RX ADMIN — METRONIDAZOLE 500 MG: 5 INJECTION, SOLUTION INTRAVENOUS at 05:07

## 2023-06-25 RX ADMIN — ACETAMINOPHEN 975 MG: 325 TABLET, FILM COATED ORAL at 20:56

## 2023-06-25 RX ADMIN — CHLORHEXIDINE GLUCONATE 15 ML: 1.2 SOLUTION ORAL at 08:57

## 2023-06-25 RX ADMIN — VANCOMYCIN HYDROCHLORIDE 1500 MG: 10 INJECTION, POWDER, LYOPHILIZED, FOR SOLUTION INTRAVENOUS at 05:53

## 2023-06-25 RX ADMIN — PANTOPRAZOLE SODIUM 40 MG: 40 INJECTION, POWDER, FOR SOLUTION INTRAVENOUS at 08:57

## 2023-06-25 RX ADMIN — METRONIDAZOLE 500 MG: 5 INJECTION, SOLUTION INTRAVENOUS at 18:31

## 2023-06-25 ASSESSMENT — ACTIVITIES OF DAILY LIVING (ADL)
ADLS_ACUITY_SCORE: 26

## 2023-06-25 NOTE — CONSULTS
[facility]EAS[facility] CONSULT NOTE     Patient:  Glenny Campos   Date of birth 1965, Medical record number [id number]  Date of Visit:  06/25/2023  Date of Admission: 6/22/2023  Consult Requester:Jhoana Estrella DO          Assessment and Recommendations:   ID Problem List  1. Right sided dental infection following dental procedure complicated by facial cellulitis with hemimandible abscess (s/p bedside I&D 6/24) and maxillary sinusitis  2. Chronic wound of left foot - 2nd and 3rd digits  3. Positive blood culture - Staph epidermidis, +6/23 in 2/2 sets with repeat NGTD - unclear if true infection vs pathogen  4. History of osteomyelitis of left great toe s/p amputation 2020 through metatarsal head, and distal 2nd toe s/p amputation 05/2023  5. T2DM with polyneuropathy (A1C 6.8)  6. Multiple antibiotics allergies     RECOMMENDATION:  1. Continue current regimen - IV vancomycin, PO levofloxacin, and PO flagyl for now  2. Repeat blood cultures daily  3. Follow up pending cultures - blood / oral abscess  4. Recommend MRI foot to evaluate for osteomyelitis  5. Appreciate clarification from OMFS/dental team regarding OSH dental procedure - what was done, ?if any exposed bone (pt/family mentions possible use of cadaver bone, but unsure if they meant current or future use?) or if further imaging recommended by dental/OMFS to evaluate any bone involvement  6. Consider outpatient allergy assessment    ASSESSMENT:  Glenny Campos is a 57 year old female with past medical history significant for Type 2 DM with retinopathy, peripheral neuropathy, previous left great toe amputation (2020, distal 2nd toe 2023) due to osteomyelitis with  chronic left foot wound who was admitted 6/22 for right sided dental infection following recent dental procedure (6/20) complicated by facial cellulitis and abscess s/p bedside I&D (culture pending). Hospital course complicated by DKA and positive blood cultures for Staph  epidermidis (started on IV vancomycin). Concern also for nonhealing chronic left foot wound for likely osteomyelitis. She has multiple reported antibiotic allergies/intolerances which make choosing a regimen difficult, in addition to the fact that patient refused to take oral antibiotics following her dental procedure and likely also after her recent May toe amputation (outside podiatry concerned for poor healing 6/7). She uses essential oils instead of antibiotics - both topically to foot and ingesting.     We recommend continue current antimicrobial regimen - IV vancomycin (cover Staph epi pending sensitivities, additional gram positive coverage), PO levofloxacin (oral lena, gram negatives) and PO flagyl (anaerobes, oral coverage) to cover dental infection, possible blood infection, and possible left foot osteomyelitis at this time. It is unclear if blood cultures from 6/23 represent true infection (possible from oral source vs foot) vs contaminant as patient remains afebrile, blood cultures collected only due to DKA workup, and repeat cultures collected at start of IV vancomycin are NGTD. Recommend further imaging of left foot to evaluate for osteomyelitis. Would appreciate if OMFS/dental team could clarify with her outside dental provider if any cadaver bone was used (patient/family report this was mentioned, though not sure if it was truly used or if discussing future needs?), if any current concern for exposed bone around dental infection, or if any further imaging from dental perspective. CT facial bones without obvious bone concern, more soft tissue inflammation and abscess.     Thank you for this consult. ID will continue to follow.     Patient was discussed with Dr. Garcia. Recommendations discussed with primary team.    Ariane Watkins PA-C  Infectious Diseases  Pager # [phone]    90 MINUTES SPENT BY ME on the date of service doing chart review, history, exam, documentation & further activities per the note.  "         History of Present Illness:     Glenny Campos is a 57 year old female with past medical history significant for Type 2 DM with retinopathy, peripheral neuropathy, previous left great toe amputation (2020, 2023) and chronic left foot wound who was admitted 6/22 for right facial cellulitis and dental infection, course complicated by DKA.     She presented to ED 6/22 with increased pain, swelling of right face following dental procedure for right 3rd tooth two days prior to admission with tooth pain beginning 6/18. She did not take post-procedure antibiotics as recommended to her and took essential oils instead. She denied fevers, abdominal pain. Had upset stomach after taking pain medication. She endorses nausea, vomiting, and possible chills, denied rigors. Denied drainage in mouth or foul taste.  at bedside and friend Amaris (homeopathic work colleague) on phone provide additional history. Overall, Glenny is a somewhat difficult historian with limited insight on my interview. Amaris more concerned for foot infection as possible source than dental, and wants foot addressed as well.     In ED, labs notable for WBC 12.2, lactic 1.4, , ESR 40. Has remained afebrile, vitals stable. Facial CT with extensive inflammatory changes around R maxillary and mandibular soft tissue with possible small fluid collection. Dental and OMFS consulted - advised I&D, however initially patient refused I&D under local anesthesia or nitrous. She reports multiple antibiotic allergies - penicillins (hives, rash), clindamycin and Zosyn at same time had rash, cephalexin (rash), and cephalosporins (unknown). She is unable to describe reactions or rash in detail to me. Mentioned one rash that she could recall was \"red and on her bottom only\", other rashes \"other places\". She denies any history of anaphylaxis. Due to multiple reported allergies, was started on levo + flagyl. Ultimately had I&D on 6/24 by OMFS at " "bedside, drainage of gross purulence noted and s/p penrose drain placement. Culture from abscess pending.     She has current chronic ulceration of left foot with edema and eschar on 3rd toe. Has previous amputation of left great toe in 2020 due to OM, had 2nd distal toe amputated in May 2023, follows with outside podiatry. Last seen by podiatry on 6/7 - note in [facility]where. She had been prescribed PO antibiotics but unclear if she actually took this. She was putting essential oils/\"black salve\" on wounds with concern for worsening. Aerobic swab of drainage from 2nd toe in outside records at time of amputation with - Klebsiella oxytoca, Strep agalactiae, Staph aureus (MSSA), and Haemophilus parainfluenzae. She now has ulceration on left 3rd toe, with continued concern for 2nd toe wound with drainage. She denies worsening pain in foot - does have some sensation, some erythema and edema. 6/8 Foot xray with amputation of great toe and partial 2nd toe, cortical disruption of 3rd toe without obvious osteomyelitis. Ortho consulted here with no plan for intervention, recommend wound care and podiatry.     Blood cultures drawn 6/23 for sepsis and DKA with positive Staph epidermidis in 2/2 sets, negative mecA gene on Verigene. She was started on IV vancomycin 6/24. Repeat blood cultures 6/24 with NGTD. Remains afebrile since admission. MRSA nares negative. She endorses some epigastric and central chest discomfort today, improved after bowel movement. Denies burning or crushing sensation. Feels     Antimicrobials:  Levofloxacin 6/22 - present  Flagyl 6/22 - present  Vancomycin 6/24 - present         Review of Systems:   CONSTITUTIONAL:  No fevers, chills or night sweats.   EYES: negative for icterus, redness, or purulent drainage.   ENT:  negative for nasal congestion, rhinorrhea, or sore throat.  RESPIRATORY:  negative for cough with sputum and dyspnea.  CARDIOVASCULAR:  negative for chest pain or " palpitations.  GASTROINTESTINAL:  negative for nausea, vomiting, diarrhea and constipation.  GENITOURINARY:  negative for dysuria, frequency, or urgency.   HEME:  No easy bruising or bleeding.  INTEGUMENT:  negative for rash and pruritus.  NEURO:  Negative for headache, vision changes, or numbness/tingling in extremities.         Past Medical History:     Past Medical History:   Diagnosis Date     Type 2 diabetes mellitus with diabetic polyneuropathy (H)             Past Surgical History:   History reviewed. No pertinent surgical history.         Family History:     History reviewed. No pertinent family history.         Social History:     Social History     Tobacco Use     Smoking status: Not on file     Smokeless tobacco: Not on file   Substance Use Topics     Alcohol use: Not on file     History   Sexual Activity     Sexual activity: Not on file            Current Medications:       acetaminophen  975 mg Oral Q8H     chlorhexidine  15 mL Swish & Spit BID     [Held by provider] insulin aspart  1-7 Units Subcutaneous TID AC     [Held by provider] insulin aspart  1-5 Units Subcutaneous At Bedtime     insulin aspart   Subcutaneous TID w/meals     [Held by provider] insulin glargine  10 Units Subcutaneous BID     insulin glargine  40 Units Subcutaneous Q24H     levofloxacin  750 mg Intravenous Q24H     lidocaine (PF)  10 mL Infiltration Once     metroNIDAZOLE  500 mg Intravenous Q12H     pantoprazole  40 mg Intravenous Daily with breakfast     sodium chloride (PF)  3 mL Intracatheter Q8H     vancomycin  1,500 mg Intravenous Q12H            Allergies:     Allergies   Allergen Reactions     Cephalosporins      Other reaction(s): *Unknown     Piperacillin-Tazobactam In Dex Hives     Statins Muscle Pain (Myalgia)     Cephalexin Rash     Clindamycin Rash     possible rash, was on zosyn at the same time       Penicillins Hives and Rash            Physical Exam:   Vitals were reviewed  Patient Vitals for the past 24 hrs:    BP Temp Temp src Pulse Resp SpO2   06/25/23 0553 (!) 143/56 98.8  F (37.1  C) Axillary 78 16 99 %   06/25/23 0049 (!) 143/59 99.7  F (37.6  C) Axillary 81 18 96 %   06/24/23 1700 (!) 150/67 -- -- 87 -- 97 %       Physical Examination:  Constitutional: Adult female seen sitting up in bed, in NAD. Alert and interactive.   HEENT: NCAT, anicteric sclerae, conjunctiva clear. EOMI without pain. Moist mucous membranes without lesions or thrush. Swelling and erythema over R maxilla infraorbitally with pain to touch, induration. No external drainage. Has swelling to lateral/posterior neck, mild submandibular swelling, no submental swelling. Penrose drain seen in buccal mucosa, edema of lips.  Respiratory: Non-labored breathing, good air exchange. Lungs are clear to auscultation bilaterally, without wheezing, crackles or rhonchi. No cough noted.   Cardiovascular: Regular rate and rhythm with no murmur, rub or gallop.  GI: Normoactive BS. Abdomen is soft, non-distended, and non-tender to palpation. No rigidity or guarding.  Skin: Warm and dry.  Musculoskeletal: Left lower extremity (photos in media tab) - with eschar on 3rd distal phalanx, 2nd phalanx with ulceration and drainage between 2nd and 3rd digits medial/dorsally. No significant tenderness to palpation. No significant edema or erythema of foot, just erythema of 2nd and 3rd digits.  Neurologic: A &O x3, speech normal, answering questions appropriately. Moves all extremities spontaneously. Grossly non-focal.  Neuropsychiatric: Mentation and affect normal/appropriate.  VAD: PIV is c/d/i with no erythema, drainage, or tenderness.         Laboratory Data:     Inflammatory Markers    Recent Labs   Lab Test 06/23/23  1422   SED 40*       Hematology Studies    Recent Labs   Lab Test 06/24/23  0601 06/23/23  0622 06/22/23  1640   WBC 12.2* 15.1* 12.2*   HGB 12.8 13.8 14.6   MCV 93 92 93    251 239       Metabolic Studies     Recent Labs   Lab Test 06/25/23  0756  06/24/23  1426 06/24/23  0601 06/23/23  2049 06/23/23  1422 06/23/23  0918     --  137 138 137 136   POTASSIUM 3.5 4.0 3.9 3.6 3.8 4.3   CHLORIDE 105  --  104 105 102 99   CO2 22  --  22 23 20* 17*   BUN 6.1  --  8.2 7.8 7.9 7.7   CR 0.44*  --  0.49* 0.51 0.50* 0.57   GFRESTIMATED >90  --  >90 >90 >90 >90       Hepatic Studies    Recent Labs   Lab Test 06/25/23  0756 06/23/23  0622 06/22/23  1640   BILITOTAL 0.3 0.4 0.5   ALKPHOS 94 107* 102   ALBUMIN 3.2* 4.0 4.2   AST 30 18 16   ALT 16 12 5       Microbiology:  Culture   Date Value Ref Range Status   06/24/2023 No growth after 12 hours  Preliminary   06/24/2023 No growth after 12 hours  Preliminary   06/24/2023 No anaerobic organisms isolated after 1 day  Preliminary   06/24/2023 Culture in progress  Preliminary   06/23/2023 Positive on the 1st day of incubation (A)  Preliminary   06/23/2023 Gram positive cocci in clusters (AA)  Preliminary     Comment:     1 of 2 bottles   06/23/2023 Positive on the 1st day of incubation (A)  Preliminary   06/23/2023 Gram positive cocci in clusters (AA)  Preliminary     Comment:     2 of 2 bottles   06/23/2023 No Growth  Final       Urine Studies    Recent Labs   Lab Test 06/23/23  0915   LEUKEST Small*   WBCU 24*       Vancomycin Levels  No lab results found.    Invalid input(s): VANCO    Hepatitis B Testing No lab results found.  Hepatitis C Testing   No results found for: HCVAB, HQTG, HCGENO, HCPCR, HQTRNA, HEPRNA  Respiratory Virus Testing    No results found for: RS, FLUAG    IMAGING  CT Facial Bones with Contrast  Result Date: 6/22/2023  Impression: 1. Extensive inflammatory change about the right maxillary and mandibular subcutaneous soft tissues. Questionable small fluid collection along the right hemimandible. 2. Reactive bilateral lymph nodes, noted above. 3. Right maxillary sinusitis without definite dentigerous cyst or odontogenic origin.     Right foot Xray - not yet read. Prior Xray without OM.

## 2023-06-25 NOTE — PLAN OF CARE
Goal Outcome Evaluation:        Admission/Transfer from: Admit from ED  2 RN skin assessment completed. Yes by Albina SNOW RN and Clari GAN RN  Significant findings include: Right sided facial cellulitis. Rash to buttocks. Left 2nd and 3rd toe wounds. No other significant concerns.   [facility] Nurse Consult Ordered? No

## 2023-06-25 NOTE — PLAN OF CARE
"RN [id number]:    Vital signs: /57 (BP Location: Right arm)   Pulse 70   Temp 97.9  F (36.6  C) (Axillary)   Resp 16   Ht 1.778 m (5' 10\")   Wt 125 kg (275 lb 9.2 oz)   SpO2 95%   BMI 39.54 kg/m      Status: 6/22 admit with dental infection and R face cellulitis  Neuro: AOX4  Pain/Nausea: pain managed on current pain regimen. Nausea intermittent, using aromatherapy  Cardiac: WNL  Respiratory: WNL on room air  Mobility: SBA  Diet: full liquid, tolerating  Labs: reviewed. BG checks ACHS  LDAs: PIV x 2, penrose drain in mouth  Skin/incisions: no new deficits found  GI/: continent B/B. Last BM today  New Changes: taken off insulin gtt today  Plan: continue with plan of care  "

## 2023-06-25 NOTE — PROGRESS NOTES
IDS inpatient progress note      Assessment:   Glenny Campos is a 57 year old women with past medical history of Type 2 diabetes mellitus (on insulin pump and previously followed with endo) (complicated by retinopathy, neuropathy, prior left toe amputations), obesity, HLD, biliary colic who presents with sepsis secondary to dental infection, sinusitis and facial cellulitis (right sided).    1.  Type 2 diabetes, suboptimally controlled with retinopathy, amputation, neuropathy hemoglobin A1c 6.8 on this admission  2.  Impending diabetic ketoacidosis  VBG ph=7.37, AG=20 and Co2=15  3.  Infection/stress  induced hyperglycemia  4. Elevated BMI  Still on oral intake, had an I&D, received Lantus 40 units at the bedtime yesterday.  Following that requirement on the insulin drip went down significantly to mostly 0.2 units/h    Plan:     -Can be taken off the drip today and to start on high-dose SSI 1: 25 3 times daily AC and at the bedtime.   -Continue Lantus 40 units at the bedtime.   -Increase NovoLog carb coverage from 1: 10g CHO to 1: 6 g CHO with meals and snacks.    -POC BG check 3 times daily AC at the bedtime and at 2 AM.   -hypoglycemia protocol   -recommend diet per primary care with carb counting protocol   -No need for diabetes education, was on OmniPod Dash and Gerard 3 PTA   -on discharge, will recommend outpatient follow up with [facility] endocrinology and CDE    History of Present Illness:   Glenny Campos is a 57 year old female with past medical history of Type 2 diabetes mellitus (on insulin pump and previously followed with endo) (complicated by retinopathy, neuropathy, prior left toe amputations), obesity, HLD, biliary colic who presents with sepsis secondary to dental infection, sinusitis and facial cellulitis (right sided).    Diabetes Focus:  Glenny Campos presents with Right facial cellulitis post dental procedure( root canal) this past Tuesday.  She has not been able to eat since  Tuesday.  She has had pain and low grade fevers.  She has had some vomiting she thinks from the pain medicine.  The nausea and vomiting were worse overnight. She is urinating frequently she thinks from the IV fluids and reports thirst.  Cannot really tell me if this was the case prior to admission.  She says she always runs higher BG overnight.  Her BG in the am have been 150 and then the rest of the day they are 200's.  She cannot tell me if she was running in basal 1 or basal 2.  She says she removed her pod-- she thinks on Wednesday night and did not replace it because she thought she would need an MRI and she thought she needed to come in.  Her BG were initially in the low 200's upon admission and then this morning up to the 300's.  She received Lantus 10 last night and Lantus 10 again this morning with medium sliding scale.  Her presenting Co2=21 and then Co2= 17 this am.  Her AG was 15 and AG 20 this am.  Her ketone this am=4.00 and her szuv=456.   Her VBG has ph=7.37. She has good mentation although teary and ill appearing.       Review BG:  Insulin drip rate came down to 0 after adding Lantus 40 units.      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  With infection of  Third toe, diabetic retinopathy, diabetic neuropathy  Prior to Admission Diabetes Regimen:    Did not tolerate metformin  Gerard 3  Omnipod Dash ( has been wearing this since 10/13/2022)    BG monitor: Gerard 3  Frequency of checks: multiple times daily  Omnipod Dash (the following settings were confirmed from the PDM)  Basal 1                       (these are the settings basal 1 which she was using prior to the admission)  00:00-11:29-->  1.05  11:30 am to 17:59-->  1.0  18:00 to 23:59-->  1.05  Total basal=24.85  ISF=1:45  Insulin to carb ratio: 1:15   Target Glucose=100-110  Total Gluc 4 hours    Inactive Basal 2  "(switched from the settings to basal 1 prior to the admission)  00:00-06:59--> 1.5  07:00-16:59--> 1  17:00--23:59-->2  Total basal 34.5      Omnipod Dash setting per Epic in 11/2/22    Basal 00:00-09:59 am-->  1.5  10:00 am to 21:59--> 1.0  22:00 to 23:59--> 1.5    ISF=1:55  1:18 carb ratio  Target=110-120    Diet: 2 meals but has not eaten since Tuesday  Usual BG control PTA:  with A1c 6.8 on 6/22/2023  History of DKA:  Less likely DKA but just ketosis  Able to Detect Hypoglycemia: yes she gets sweaty and fatigue  Usual Diabetes Care Provider: Lori Leone MD   Department of Diabetes and Endocrinology   [facility]     Primary Care Provider: No primary care provider on file.  Factors Impacting IP Glucose Control: Cellulitis of the right face, not able to eat well due to swelling, prefers supplements rather then medication  Current Diet: full liquid diet        Physical Exam   BP (!) 143/56 (BP Location: Right arm, Patient Position: Semi-Nails's)   Pulse 78   Temp 98.8  F (37.1  C) (Axillary)   Resp 16   Ht 1.778 m (5' 10\")   Wt 131.5 kg (290 lb)   SpO2 99%   BMI 41.61 kg/m    General: awake, alert appears ill,  present at the bedside   HEENT:  Facial swelling.  Lungs: unlabored respiration, no cough  MSK:  moves all extremities,   Mental status:  alert, oriented to self, place, date  Psych:  Normal mood and affect.    Most Recent Laboratory Tests:  Recent Labs   Lab 06/24/23  0601   HGB 12.8     Recent Labs   Lab 06/22/23  1640   A1C 6.8*     Recent Labs   Lab 06/24/23  0601   CR 0.49*     Recent Labs   Lab 06/25/23  0506 06/25/23  0257 06/25/23  0102 06/25/23  0005 06/24/23  2318 06/24/23  2157   * 111* 135* 132* 118* 139*     ROUTINE IP LABS (Last four results)  BMP  Recent Labs   Lab 06/25/23  0506 06/25/23  0257 06/25/23  0102 06/25/23  0005 06/24/23  1506 06/24/23  1426 06/24/23  0701 06/24/23  0601 06/23/23  2156 06/23/23  2049 06/23/23  1527 06/23/23  1422 " 06/23/23  1134 06/23/23  0918   NA  --   --   --   --   --   --   --  137  --  138  --  137  --  136   POTASSIUM  --   --   --   --   --  4.0  --  3.9  --  3.6  --  3.8  --  4.3   CHLORIDE  --   --   --   --   --   --   --  104  --  105  --  102  --  99   JOSE CARLOS  --   --   --   --   --   --   --  9.1  --  9.1  --  9.3  --  9.2   CO2  --   --   --   --   --   --   --  22  --  23  --  20*  --  17*   BUN  --   --   --   --   --   --   --  8.2  --  7.8  --  7.9  --  7.7   CR  --   --   --   --   --   --   --  0.49*  --  0.51  --  0.50*  --  0.57   * 111* 135* 132*   < >  --    < > 138*   < > 178*   < > 230*   < > 320*    < > = values in this interval not displayed.     CBC  Recent Labs   Lab 06/24/23  0601 06/23/23  0622 06/22/23  1640   WBC 12.2* 15.1* 12.2*   RBC 4.15 4.43 4.66   HGB 12.8 13.8 14.6   HCT 38.4 40.6 43.1   MCV 93 92 93   MCH 30.8 31.2 31.3   MCHC 33.3 34.0 33.9   RDW 12.6 12.4 12.1    251 239     INR  Recent Labs   Lab 06/23/23  0622   INR 1.30*         trend or trigger automated decision support.     C-Peptide, Serum  Order: [id number]   Ref Range & Units 10 mo ago   C-Peptide 0.5 - 3.3 ng/mL 1.2    Comment: INTERPRETIVE INFORMATION: Serum, C-Peptide     Reference Interval applies to fasting specimens. To convert to   nmol/L, multiply by 0.33   Performed By: [employer] [facility]   [address] [place] [address]   : Gurvinder Haro MD, PhD   Resulting Agency  [facility]     Specimen Collected: 08/25/22  8:26 AM    Performed by: [facility] Last Resulted: 08/27/22  7:49 PM   Received From: [facility]  Result Received: 06/22/23  3:12 PM         Italo Franco     Endocrinology diabetes and metabolism  fellow   Pager number: [phone]         I have seen and examined the patient, reviewed records, reviewed and edited the fellow's note.  I agree with the plan of care.    Preet Simmons MD   Division of Diabetes, Endocrinology and  Metabolism  Department of Medicine

## 2023-06-25 NOTE — PROGRESS NOTES
[facility]    Medicine Progress Note - Hospitalist Service, GOLD TEAM 10    Date of Admission:  6/22/2023    Assessment & Plan   58 yo female with history of DM2 on insulin pump complicated by retinopathy, peripheral neuropathy, and prior left toe amputations and chronic left foot wound, obesity, HLD admitted for sepsis 2/2 dental infection and R facial cellulitis. Hospital course complicated by DKA 2/2 infection.     Today:  -ID consulted, appreciate assistance    -Continue current antibiotic regimen    -MRI left foot to evaluate for osteomyelitis   -Stopped insulin drip per endocrine recs, start carb coverage   -Will need to clarify with OMFS if any exposed bone in mouth     Sepsis 2/2 R Canine Space Abscess, Associated Facial Cellulitis and Sinusitis   S/p I&D of a right canine space abscess with placement of a penrose drain (x1) 6/24   Had root canal of R maxillary 1st molar on 6/20 as outpatient which led to above complication. Presented to ED with worsening facial pain and swelling, had leukocytosis and tachycardia at admission.   -Antibiotics (has multiple drug allergies)    -Levofloxacin 6/22 -    -Metronidazole 6/22 -    -Vancomycin 6/24 -   -OMFS consulted, appreciate assistance    -s/p I&D of a right canine space abscess with placement of a penrose drain (x1).   -Warm compress, avoid ice, HOB >30   -Dental team consulted, appreciate assistance    -Recommend 0.12% chlorhexidine glutamate mouthrinse BID  -Pain control: Scheduled acetaminophen, PRN oral oxycodone, PRN IV dilaudid, PRN toradol    -ID consulted, appreciate assistance     Staph Epi + Blood cultures 2/4   Blood cultures drawn 6/23 as a part of sepsis/DKA work-up. 6/24 returned positive for staph Epi (methicillin sensitive). Unclear if represents true bacteremia vs contamination.   -Continue vancomycin 6/24 -   -Follow up repeat blood cultures 6/24 - NGTD   -ID consulted, appreciate assistance     -Daily blood cultures till clearance     Insulin Dependent DM2  Likely DKA 2/2 infection as above, improving DKA   Typically has a continuous glucose monitor and an insulin pump and per notes and per patient's pump she has about 1 unit of humalog per hour. Has had her pump off for about 12 hours prior to admission.   On 6/23 AM, labs showed DKA although pH on VBG was 7.37 (?). AGMA and glucose improved with insulin drip. Serum ketones still elevated, suspect 2/2 alternate cause (starvation ketosis?).   -Endocrine consulted, appreciate assistance   -6/23 - started insulin drip with DKA protocol in AM, with closing gap transitioned to non-DKA insulin drip in evening. Lantus given per endocrine recs.   -6/24 - continued on insulin drip + lantus per endocrine recs/assistance  -6/25 - discontinue insulin drip      Left Third Toe Wound   Concern for Osteomyelitis   With h/o osteomyelitis of prior amputated toes. X-ray without obvious signs of osteo.   -Podiatry/Orthopedics consulted, appreciate assistance    -No evidence of infections   -No urgent interventions   -Needs ongoing podiatry care  -WOCN Consulted    -Wound cares per their orders   -In addition to podiatry consult, recommend vascular consult. Will place consult pending remainder of hospital course.  -MRI Left forefoot per ID recs        Diet: Combination Diet Full Liquid    DVT Prophylaxis: Pneumatic Compression Devices and Ambulate every shift  Brenner Catheter: Not present  Lines: None     Cardiac Monitoring: None  Code Status: Full Code      Clinically Significant Risk Factors              # Hypoalbuminemia: Lowest albumin = 3.2 g/dL at 6/25/2023  7:56 AM, will monitor as appropriate  # Coagulation Defect: INR = 1.30 (Ref range: 0.85 - 1.15) and/or PTT = N/A, will monitor for bleeding          # DMII: A1C = 6.8 % (Ref range: <5.7 %) within past 6 months, PRESENT ON ADMISSION  # Obesity: Estimated body mass index is 39.54 kg/m  as calculated from the  "following:    Height as of this encounter: 1.778 m (5' 10\").    Weight as of this encounter: 125 kg (275 lb 9.2 oz)., PRESENT ON ADMISSION          Disposition Plan      Expected Discharge Date: 06/26/2023                  Jhoana Estrella DO  Hospitalist Service, GOLD TEAM 10  [facility]  Securely message with [employer] (more info)  Text page via [employer] Paging/Directory   See signed in provider for up to date coverage information  ______________________________________________________________________    Interval History   No overnight events reported.  Feels generally unwell, pain in her face.  Feeling nauseous. Concerned drain in her mouth has moved.     Physical Exam   Vital Signs: Temp: 97.9  F (36.6  C) Temp src: Axillary BP: 139/57 Pulse: 70   Resp: 16 SpO2: 95 % O2 Device: None (Room air)    Weight: 275 lbs 9.2 oz    Constitutional: no apparent distress, pleasant and cooperative   Cardiovascular: regular rate and rhythm, normal S1 and S2  Respiratory: clear to auscultation bilaterally, no wheezing, rales, or rhonchi, normal work of breathing   GI: abdomen soft, non tender, non distended, bowel sounds present   Neuro: alert and oriented, no gross focal deficits noted   Skin: Right upper cheek, upper lip, and lower eyelid swollen, erythematous, and tender, appears somewhat improved from yesterday     Medical Decision Making       MANAGEMENT DISCUSSED with the following over the past 24 hours: infectious disease       Data     I have personally reviewed the following data over the past 24 hrs:    N/A  \   N/A   / N/A     139 105 6.1 /  100 (H)   3.5 22 0.44 (L) \       ALT: 16 AST: 30 AP: 94 TBILI: 0.3   ALB: 3.2 (L) TOT PROTEIN: 6.7 LIPASE: N/A       Imaging results reviewed over the past 24 hrs:   No results found for this or any previous visit (from the past 24 hour(s)).  "

## 2023-06-25 NOTE — PLAN OF CARE
Goal Outcome Evaluation:      Plan of Care Reviewed With: patient    Overall Patient Progress: no change    Outcome Evaluation: Patient admitted from ED. Patient is alert and orientedx4. Right sided facial swelling/cellulitis. Pain management for face/head pain includes heat packs and scheduled tylenol. VSS on room air. Denies SOB or chest pain. Complains of intermittent nausea, denies need for prns at this time. Tele monitoring in place. Insulin drip continues with BG q2 hr as the BGs have been within goal range. IV tko between abx. ID consult tomorrow. Pt swallows without difficulty. Penrose drain in place on right side of inner mouth. Pt reports last BM was PTA but states she is passing gas. SBA to bathroom. Electrolyte recheck scheduled for AM. Pt able to make needs known. Cont. with POC.

## 2023-06-26 LAB
ALBUMIN SERPL BCG-MCNC: 3.3 G/DL (ref 3.5–5.2)
ALP SERPL-CCNC: 88 U/L (ref 35–104)
ALT SERPL W P-5'-P-CCNC: 11 U/L (ref 0–50)
ANION GAP SERPL CALCULATED.3IONS-SCNC: 14 MMOL/L (ref 7–15)
AST SERPL W P-5'-P-CCNC: 20 U/L (ref 0–45)
BACTERIA BLD CULT: ABNORMAL
BILIRUB SERPL-MCNC: 0.2 MG/DL
BUN SERPL-MCNC: 5.4 MG/DL (ref 6–20)
C DIFF TOX B STL QL: NEGATIVE
CALCIUM SERPL-MCNC: 8.7 MG/DL (ref 8.6–10)
CHLORIDE SERPL-SCNC: 105 MMOL/L (ref 98–107)
CREAT SERPL-MCNC: 0.48 MG/DL (ref 0.51–0.95)
DEPRECATED HCO3 PLAS-SCNC: 22 MMOL/L (ref 22–29)
ERYTHROCYTE [DISTWIDTH] IN BLOOD BY AUTOMATED COUNT: 12.3 % (ref 10–15)
GFR SERPL CREATININE-BSD FRML MDRD: >90 ML/MIN/1.73M2
GLUCOSE BLDC GLUCOMTR-MCNC: 104 MG/DL (ref 70–99)
GLUCOSE BLDC GLUCOMTR-MCNC: 106 MG/DL (ref 70–99)
GLUCOSE BLDC GLUCOMTR-MCNC: 108 MG/DL (ref 70–99)
GLUCOSE BLDC GLUCOMTR-MCNC: 113 MG/DL (ref 70–99)
GLUCOSE BLDC GLUCOMTR-MCNC: 126 MG/DL (ref 70–99)
GLUCOSE SERPL-MCNC: 112 MG/DL (ref 70–99)
HCT VFR BLD AUTO: 37 % (ref 35–47)
HGB BLD-MCNC: 12.2 G/DL (ref 11.7–15.7)
MAGNESIUM SERPL-MCNC: 2 MG/DL (ref 1.7–2.3)
MCH RBC QN AUTO: 30.6 PG (ref 26.5–33)
MCHC RBC AUTO-ENTMCNC: 33 G/DL (ref 31.5–36.5)
MCV RBC AUTO: 93 FL (ref 78–100)
PHOSPHATE SERPL-MCNC: 3.7 MG/DL (ref 2.5–4.5)
PLATELET # BLD AUTO: 231 10E3/UL (ref 150–450)
POTASSIUM SERPL-SCNC: 3.6 MMOL/L (ref 3.4–5.3)
PROT SERPL-MCNC: 6.6 G/DL (ref 6.4–8.3)
RBC # BLD AUTO: 3.99 10E6/UL (ref 3.8–5.2)
SODIUM SERPL-SCNC: 141 MMOL/L (ref 136–145)
VANCOMYCIN SERPL-MCNC: 11.9 UG/ML
WBC # BLD AUTO: 5 10E3/UL (ref 4–11)

## 2023-06-26 PROCEDURE — 250N000011 HC RX IP 250 OP 636: Mod: JZ | Performed by: INTERNAL MEDICINE

## 2023-06-26 PROCEDURE — 80202 ASSAY OF VANCOMYCIN: CPT | Performed by: INTERNAL MEDICINE

## 2023-06-26 PROCEDURE — 250N000013 HC RX MED GY IP 250 OP 250 PS 637: Performed by: INTERNAL MEDICINE

## 2023-06-26 PROCEDURE — 87493 C DIFF AMPLIFIED PROBE: CPT | Performed by: INTERNAL MEDICINE

## 2023-06-26 PROCEDURE — C9113 INJ PANTOPRAZOLE SODIUM, VIA: HCPCS | Mod: JZ | Performed by: INTERNAL MEDICINE

## 2023-06-26 PROCEDURE — 250N000013 HC RX MED GY IP 250 OP 250 PS 637

## 2023-06-26 PROCEDURE — 120N000002 HC R&B MED SURG/OB UMMC

## 2023-06-26 PROCEDURE — 250N000011 HC RX IP 250 OP 636: Performed by: INTERNAL MEDICINE

## 2023-06-26 PROCEDURE — 87040 BLOOD CULTURE FOR BACTERIA: CPT | Performed by: INTERNAL MEDICINE

## 2023-06-26 PROCEDURE — 99233 SBSQ HOSP IP/OBS HIGH 50: CPT | Performed by: INTERNAL MEDICINE

## 2023-06-26 PROCEDURE — 258N000003 HC RX IP 258 OP 636: Performed by: INTERNAL MEDICINE

## 2023-06-26 PROCEDURE — 999N000128 HC STATISTIC PERIPHERAL IV START W/O US GUIDANCE

## 2023-06-26 PROCEDURE — 85027 COMPLETE CBC AUTOMATED: CPT | Performed by: INTERNAL MEDICINE

## 2023-06-26 PROCEDURE — 36415 COLL VENOUS BLD VENIPUNCTURE: CPT | Performed by: INTERNAL MEDICINE

## 2023-06-26 PROCEDURE — 99233 SBSQ HOSP IP/OBS HIGH 50: CPT | Performed by: STUDENT IN AN ORGANIZED HEALTH CARE EDUCATION/TRAINING PROGRAM

## 2023-06-26 PROCEDURE — 99232 SBSQ HOSP IP/OBS MODERATE 35: CPT | Performed by: PHYSICIAN ASSISTANT

## 2023-06-26 PROCEDURE — 80053 COMPREHEN METABOLIC PANEL: CPT | Performed by: INTERNAL MEDICINE

## 2023-06-26 PROCEDURE — 83735 ASSAY OF MAGNESIUM: CPT | Performed by: INTERNAL MEDICINE

## 2023-06-26 PROCEDURE — 250N000012 HC RX MED GY IP 250 OP 636 PS 637: Performed by: STUDENT IN AN ORGANIZED HEALTH CARE EDUCATION/TRAINING PROGRAM

## 2023-06-26 PROCEDURE — 84100 ASSAY OF PHOSPHORUS: CPT | Performed by: INTERNAL MEDICINE

## 2023-06-26 RX ADMIN — INSULIN ASPART 1 UNITS: 100 INJECTION, SOLUTION INTRAVENOUS; SUBCUTANEOUS at 19:47

## 2023-06-26 RX ADMIN — VANCOMYCIN HYDROCHLORIDE 1500 MG: 10 INJECTION, POWDER, LYOPHILIZED, FOR SOLUTION INTRAVENOUS at 04:40

## 2023-06-26 RX ADMIN — LEVOFLOXACIN 750 MG: 5 INJECTION, SOLUTION INTRAVENOUS at 20:17

## 2023-06-26 RX ADMIN — CHLORHEXIDINE GLUCONATE 15 ML: 1.2 SOLUTION ORAL at 19:47

## 2023-06-26 RX ADMIN — PANTOPRAZOLE SODIUM 40 MG: 40 INJECTION, POWDER, FOR SOLUTION INTRAVENOUS at 09:07

## 2023-06-26 RX ADMIN — METRONIDAZOLE 500 MG: 5 INJECTION, SOLUTION INTRAVENOUS at 18:21

## 2023-06-26 RX ADMIN — ACETAMINOPHEN 975 MG: 325 TABLET, FILM COATED ORAL at 04:38

## 2023-06-26 RX ADMIN — METRONIDAZOLE 500 MG: 5 INJECTION, SOLUTION INTRAVENOUS at 06:47

## 2023-06-26 RX ADMIN — VANCOMYCIN HYDROCHLORIDE 2000 MG: 1 INJECTION, POWDER, LYOPHILIZED, FOR SOLUTION INTRAVENOUS at 13:35

## 2023-06-26 ASSESSMENT — ACTIVITIES OF DAILY LIVING (ADL)
ADLS_ACUITY_SCORE: 26

## 2023-06-26 NOTE — PHARMACY-VANCOMYCIN DOSING SERVICE
Pharmacy Vancomycin Note  Date of Service 2023  Patient's  1965   57 year old, female    Indication: Bacteremia, Dental Infection, SSTI, possible Osteo  Day of Therapy: 3  Current vancomycin regimen:  1500 mg IV q12h  Current vancomycin monitoring method: AUC  Current vancomycin therapeutic monitoring goal: 400-600 mg*h/L    [employer]ight[employer] Prediction of Current Vancomycin Regimen    Regimen: 1500 mg IV every 12 hours.  Start time: 16:40 on 2023  Exposure target: AUC24 (range)400-600 mg/L.hr   AUC24,ss: 408 mg/L.hr  Probability of AUC24 > 400: 53 %  Ctrough,ss: 13 mg/L  Probability of Ctrough,ss > 20: 22 %  Probability of nephrotoxicity (Lodise COLTEN ): 8 %      Current estimated CrCl = Estimated Creatinine Clearance: 190.5 mL/min (A) (based on SCr of 0.48 mg/dL (L)).    Creatinine for last 3 days  2023:  2:22 PM Creatinine 0.50 mg/dL;  8:49 PM Creatinine 0.51 mg/dL  2023:  6:01 AM Creatinine 0.49 mg/dL  2023:  7:56 AM Creatinine 0.44 mg/dL  2023:  7:04 AM Creatinine 0.48 mg/dL    Recent Vancomycin Levels (past 3 days)  2023: 10:42 AM Vancomycin 11.9 ug/mL    Vancomycin IV Administrations (past 72 hours)                   vancomycin (VANCOCIN) 1,500 mg in 0.9% NaCl 250 mL intermittent infusion (mg) 1,500 mg New Bag 23 0440     1,500 mg New Bag 23 1617     1,500 mg New Bag  0553    vancomycin (VANCOCIN) 2,500 mg in sodium chloride 0.9 % 500 mL intermittent infusion (mg) 2,500 mg New Bag 23 1539                Nephrotoxins and other renal medications (From now, onward)    Start     Dose/Rate Route Frequency Ordered Stop    23 1300  vancomycin (VANCOCIN) 2,000 mg in sodium chloride 0.9 % 500 mL intermittent infusion         2,000 mg  over 120 Minutes Intravenous EVERY 12 HOURS 23 1157      23 1428  ketorolac (TORADOL) injection 30 mg         30 mg Intravenous EVERY 6 HOURS PRN 23 1428 23 1427             Contrast Orders - past  72 hours (72h ago, onward)    None          Interpretation of levels and current regimen:  Vancomycin level is reflective of -600, but barely in range and the stap epi from blood shows PATRICIA of 2 to vanco, so will likely need to be a little more aggressive (Levaquin should cover as well, however)     Has serum creatinine changed greater than 50% in last 72 hours: No      Renal Function: Stable    InsightRX Prediction of Planned New Vancomycin Regimen        Plan:  1. Increase Dose to 2000mg IV vanco Q12H  2. Vancomycin monitoring method: AUC  3. Vancomycin therapeutic monitoring goal: 400-600 mg*h/L  4. Pharmacy will check vancomycin levels as appropriate in 1-3 Days.  5. Serum creatinine levels will be ordered daily for the first week of therapy and at least twice weekly for subsequent weeks.    Jorge A Narayanan PharmD, [facility]    [phone]  Pager [phone]

## 2023-06-26 NOTE — PROGRESS NOTES
[facility]    Medicine Progress Note - Hospitalist Service, GOLD TEAM 10    Date of Admission:  6/22/2023    Assessment & Plan   58 yo female with history of DM2 on insulin pump complicated by retinopathy, peripheral neuropathy, and prior left toe amputations and chronic left foot wound, obesity, HLD admitted for sepsis 2/2 dental infection and R facial cellulitis. Hospital course complicated by DKA 2/2 infection. Concern for L foot osteomyelitis.     Today:  -ID consulted, appreciate assistance. Discussed with them today.    -Continue current antibiotic regimen    -MRI left foot to evaluate for osteomyelitis pending, will determine antibiotic course after MRI complete.   -Discussed with OMFS. Described current facial exam, they felt this was consistent with expected healing. Confirmed no exposed bone in the mouth.   -Follow up C. Diff ordered overnight for diarrhea     Sepsis 2/2 R Canine Space Abscess, Associated Facial Cellulitis and Sinusitis   S/p I&D of a right canine space abscess with placement of a penrose drain (x1) 6/24   Had root canal of R maxillary 1st molar on 6/20 as outpatient which led to above complication. Presented to ED with worsening facial pain and swelling, had leukocytosis and tachycardia at admission.   -Antibiotics (has multiple drug allergies)    -Levofloxacin 6/22 -    -Metronidazole 6/22 -    -Vancomycin 6/24 -   -OMFS consulted, appreciate assistance    -s/p I&D of a right canine space abscess with placement of a penrose drain (x1).   -Warm compress, avoid ice, HOB >30   -Dental team consulted, appreciate assistance    -Recommend 0.12% chlorhexidine glutamate mouthrinse BID  -Pain control: Scheduled acetaminophen, PRN oral oxycodone, PRN IV dilaudid, PRN toradol    -ID consulted, appreciate assistance     Staph Epi + Blood cultures 3/4   Blood cultures drawn 6/23 as a part of sepsis/DKA work-up. 6/24 returned positive for staph Epi  (methicillin sensitive). Unclear if represents true bacteremia vs contamination.   -Continue vancomycin 6/24 -   -Follow up repeat blood cultures 6/24 - NGTD   -ID consulted, appreciate assistance    -Daily blood cultures till clearance     Insulin Dependent DM2  Likely DKA 2/2 infection as above, improving DKA   Typically has a continuous glucose monitor and an insulin pump and per notes and per patient's pump she has about 1 unit of humalog per hour. Has had her pump off for about 12 hours prior to admission.   On 6/23 AM, labs showed DKA although pH on VBG was 7.37 (?). AGMA and glucose improved with insulin drip. Serum ketones still elevated, suspect 2/2 alternate cause (starvation ketosis?).   -Endocrine consulted, appreciate assistance   -6/23 - started insulin drip with DKA protocol in AM, with closing gap transitioned to non-DKA insulin drip in evening. Lantus given per endocrine recs.   -6/24 - continued on insulin drip + lantus per endocrine recs/assistance  -6/25 - discontinue insulin drip      Left Third Toe Wound   Concern for Osteomyelitis   With h/o osteomyelitis of prior amputated toes. X-ray without obvious signs of osteo.   -Podiatry/Orthopedics consulted, appreciate assistance    -No evidence of infection   -No urgent interventions   -Needs ongoing podiatry care  -WOCN Consulted    -Wound cares per their orders   -In addition to podiatry consult, recommend vascular consult. Will place consult pending remainder of hospital course.  -MRI Left forefoot per ID recs        Diet: Mechanical/Dental Soft Diet    DVT Prophylaxis: Pneumatic Compression Devices and Ambulate every shift  Brenner Catheter: Not present  Lines: None     Cardiac Monitoring: None  Code Status: Full Code      Clinically Significant Risk Factors              # Hypoalbuminemia: Lowest albumin = 3.2 g/dL at 6/25/2023  7:56 AM, will monitor as appropriate           # DMII: A1C = 6.8 % (Ref range: <5.7 %) within past 6 months   # Obesity:  "Estimated body mass index is 39.54 kg/m  as calculated from the following:    Height as of this encounter: 1.778 m (5' 10\").    Weight as of this encounter: 125 kg (275 lb 9.2 oz).           Disposition Plan      Expected Discharge Date: 06/30/2023        Discharge Comments: Pending antibiotics plan, may need to discharge home with Iv antibiotics pending ID Bindu Estrella DO  Hospitalist Service, GOLD TEAM 10  [facility]  Securely message with [employer] (more info)  Text page via [email or web address] Paging/Directory   See signed in provider for up to date coverage information  ______________________________________________________________________    Interval History   No overnight events reported.  Improved nausea today. Had 2-3 episodes of diarrhea overnight. Denies abdominal pain. Has not tried to eat much. Feels her face is getting more hard.     Physical Exam   Vital Signs: Temp: 98.3  F (36.8  C) Temp src: Oral BP: (!) 147/58 Pulse: 68   Resp: 18 SpO2: 97 % O2 Device: None (Room air)    Weight: 275 lbs 9.2 oz    Constitutional: no apparent distress, pleasant and cooperative   Cardiovascular: regular rate and rhythm, normal S1 and S2  Respiratory: clear to auscultation bilaterally, no wheezing, rales, or rhonchi, normal work of breathing   GI: abdomen soft, non tender, non distended, bowel sounds present   Neuro: alert and oriented, no gross focal deficits noted   Skin: Right cheek with are that is firm and erythematous, overall improved erythema and swelling from prior     Medical Decision Making       MANAGEMENT DISCUSSED with the following over the past 24 hours: infectious disease       Data     I have personally reviewed the following data over the past 24 hrs:    5.0  \   12.2   / 231     141 105 5.4 (L) /  106 (H)   3.6 22 0.48 (L) \       ALT: 11 AST: 20 AP: 88 TBILI: 0.2   ALB: 3.3 (L) TOT PROTEIN: 6.6 LIPASE: N/A       Imaging results reviewed over the past 24 " hrs:   No results found for this or any previous visit (from the past 24 hour(s)).

## 2023-06-26 NOTE — PLAN OF CARE
Goal Outcome Evaluation:      Plan of Care Reviewed With: patient    Overall Patient Progress: no change    Outcome Evaluation: Patient reported diarrhea overnight. Need stool sample for rule out Cdiff. Enteric precautions initiated. Pt remains on IV abx regimen. Pain being managed with scheduled tylenol. Tolerating full liquid diet. BG stable off insulin drip. LAntus given at bedtime. Pt able to make needs known. Cont.plan of care.

## 2023-06-26 NOTE — PROGRESS NOTES
IDS inpatient progress note      Assessment:   Glenny Campos is a 57 year old women with past medical history of Type 2 diabetes mellitus (on insulin pump and previously followed with endo) (complicated by retinopathy, neuropathy, prior left toe amputations), obesity, HLD, biliary colic who presents with sepsis secondary to dental infection, sinusitis and facial cellulitis (right sided).    1.  Type 2 diabetes, suboptimally controlled with retinopathy, amputation, neuropathy hemoglobin A1c 6.8 on this admission  2.  Impending diabetic ketoacidosis  VBG ph=7.37, AG=20 and Co2=15  3.  Infection/stress  induced hyperglycemia  4. Elevated BMI  Still no to low oral intake,post I&D, received Lantus 40 units at the bedtime yesterday.  Following that requirement on the insulin drip went down significantly to mostly 0.2 units/h    Plan:    .   -Decrease Lantus 35 units at the bedtime.   -Decrease NovoLog carb coverage from 1: 7 g CHO from 1: 6 g CHO with meals and snacks.    -Continue Novolog high sliding scale at meals and bedtime >140 and >200   -POC BG check 3 times daily AC at the bedtime and at 2 AM.   -hypoglycemia protocol   -recommend diet per primary care with carb counting protocol   -No need for diabetes education, was on OmniPod Dash and Gerard 3 PTA   -on discharge, will recommend outpatient follow up with [facility] endocrinology and CDE    BG Review:  Insulin drip stopped on 6/26 and has been running 104-118, pretty tight. She had broth yesterday but fid it difficult ton talk today so typing on her phone.   present. Antibiotics are making her feel bad with diarrhea.  WBC is down, face is mch less swollen then Friday when saw her.  No nausea.  Decrease lantus to 35 units.  Not eating but decreased to 1:7 and cont high sliding scale.  C02=22, AG=14, creat=0.48        History of Present Illness:   Glenny Campos is a 57 year old female with past medical history of Type 2 diabetes mellitus (on  insulin pump and previously followed with endo) (complicated by retinopathy, neuropathy, prior left toe amputations), obesity, HLD, biliary colic who presents with sepsis secondary to dental infection, sinusitis and facial cellulitis (right sided).    Diabetes Focus:  Glenny Campos presents with Right facial cellulitis post dental procedure( root canal) this past Tuesday.  She has not been able to eat since Tuesday.  She has had pain and low grade fevers.  She has had some vomiting she thinks from the pain medicine.  The nausea and vomiting were worse overnight. She is urinating frequently she thinks from the IV fluids and reports thirst.  Cannot really tell me if this was the case prior to admission.  She says she always runs higher BG overnight.  Her BG in the am have been 150 and then the rest of the day they are 200's.  She cannot tell me if she was running in basal 1 or basal 2.  She says she removed her pod-- she thinks on Wednesday night and did not replace it because she thought she would need an MRI and she thought she needed to come in.  Her BG were initially in the low 200's upon admission and then this morning up to the 300's.  She received Lantus 10 last night and Lantus 10 again this morning with medium sliding scale.  Her presenting Co2=21 and then Co2= 17 this am.  Her AG was 15 and AG 20 this am.  Her ketone this am=4.00 and her kihv=842.   Her VBG has ph=7.37. She has good mentation although teary and ill appearing.       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  With infection of  Third toe, diabetic retinopathy, diabetic neuropathy  Prior to Admission Diabetes Regimen:    Did not tolerate metformin  Gerard 3  Omnipod Dash ( has been wearing this since 10/13/2022)    BG monitor: Gerard 3  Frequency of checks: multiple times daily  Omnipod Dash (the following settings  "were confirmed from the PDM)  Basal 1                       (these are the settings basal 1 which she was using prior to the admission)  00:00-11:29-->  1.05  11:30 am to 17:59-->  1.0  18:00 to 23:59-->  1.05  Total basal=24.85  ISF=1:45  Insulin to carb ratio: 1:15   Target Glucose=100-110  Total Gluc 4 hours    Inactive Basal 2 (switched from the settings to basal 1 prior to the admission)  00:00-06:59--> 1.5  07:00-16:59--> 1  17:00--23:59-->2  Total basal 34.5      Omnipod Dash setting per Epic in 11/2/22    Basal 00:00-09:59 am-->  1.5  10:00 am to 21:59--> 1.0  22:00 to 23:59--> 1.5    ISF=1:55  1:18 carb ratio  Target=110-120    Diet: 2 meals but has not eaten since Tuesday  Usual BG control PTA:  with A1c 6.8 on 6/22/2023  History of DKA:  Less likely DKA but just ketosis  Able to Detect Hypoglycemia: yes she gets sweaty and fatigue  Usual Diabetes Care Provider: Lori Leone MD   Department of Diabetes and Endocrinology   Formerly [facility], [facility]     Primary Care Provider: Physician No Ref-Primary  Factors Impacting IP Glucose Control: Cellulitis of the right face, not able to eat well due to swelling, prefers supplements rather then medication  Current Diet: full liquid diet        Physical Exam   BP (!) 160/59 (BP Location: Left arm)   Pulse 77   Temp 98.8  F (37.1  C) (Axillary)   Resp 16   Ht 1.778 m (5' 10\")   Wt 125 kg (275 lb 9.2 oz)   SpO2 97%   BMI 39.54 kg/m    General: awake, alert appears ill,  present at the bedside   HEENT:  Facial swelling and erythema is much improved around eye and improved on check, less on lips.  Lungs: unlabored respiration, no cough  MSK:  moves all extremities,   Mental status:  alert, oriented to self, place, date  Psych:  Normal mood and affect.    Most Recent Laboratory Tests:  Recent Labs   Lab 06/26/23  0704   HGB 12.2     Recent Labs   Lab 06/22/23  1640   A1C 6.8*     Recent Labs   Lab 06/26/23  0704   CR 0.48*     Recent Labs "   Lab 06/26/23  0741 06/26/23  0704 06/26/23  0127 06/25/23  2206 06/25/23  1811 06/25/23  1625   * 112* 108* 119* 97 100*     ROUTINE IP LABS (Last four results)  BMP  Recent Labs   Lab 06/26/23  0741 06/26/23  0704 06/26/23  0127 06/25/23  2206 06/25/23  0859 06/25/23  0756 06/24/23  1506 06/24/23  1426 06/24/23  0701 06/24/23  0601 06/23/23  2156 06/23/23  2049   NA  --  141  --   --   --  139  --   --   --  137  --  138   POTASSIUM  --  3.6  --   --   --  3.5  --  4.0  --  3.9  --  3.6   CHLORIDE  --  105  --   --   --  105  --   --   --  104  --  105   JOSE CARLOS  --  8.7  --   --   --  8.7  --   --   --  9.1  --  9.1   CO2  --  22  --   --   --  22  --   --   --  22  --  23   BUN  --  5.4*  --   --   --  6.1  --   --   --  8.2  --  7.8   CR  --  0.48*  --   --   --  0.44*  --   --   --  0.49*  --  0.51   * 112* 108* 119*   < > 102*   < >  --    < > 138*   < > 178*    < > = values in this interval not displayed.     CBC  Recent Labs   Lab 06/26/23  0704 06/24/23  0601 06/23/23  0622 06/22/23  1640   WBC 5.0 12.2* 15.1* 12.2*   RBC 3.99 4.15 4.43 4.66   HGB 12.2 12.8 13.8 14.6   HCT 37.0 38.4 40.6 43.1   MCV 93 93 92 93   MCH 30.6 30.8 31.2 31.3   MCHC 33.0 33.3 34.0 33.9   RDW 12.3 12.6 12.4 12.1    276 251 239     INR  Recent Labs   Lab 06/23/23  0622   INR 1.30*         trend or trigger automated decision support.     C-Peptide, Serum  Order: [id number]   Ref Range & Units 10 mo ago   C-Peptide 0.5 - 3.3 ng/mL 1.2    Comment: INTERPRETIVE INFORMATION: Serum, C-Peptide     Reference Interval applies to fasting specimens. To convert to   nmol/L, multiply by 0.33   Performed By: [facility]   [address] [place] [id number]   : Gurvinder Haro MD, PhD   Resulting Agency  [facility]     Specimen Collected: 08/25/22  8:26 AM    Performed by: [facility] Last Resulted: 08/27/22  7:49 PM   Received From: [facility]  Result Received: 06/22/23  3:12  PM         Contacting the Inpatient Diabetes Team   From 8AM-4PM: page inpatient diabetes provider that is following the patient, or utilize the job code paging system.   From 4PM-8AM: page the job code for endocrine fellow on call.        Please use the following job code to reach the Inpatient Diabetes team. Note that you must use an in house phone and that job codes cannot receive text pages.     Dial [phone] (or star-star-star [phone] on the new [employer] telephones), then [phone] to reach the endocrine-diabetes provider on call.    Review of prior external note(s) from - [facility] or Care Everywhere    minutes spent on the date of the encounter doing chart review, history and exam, documentation and further activities per the note     Over 50% of my time on the unit was spent counseling the patient and/or coordinating care regarding acute hyperglycemia management.  See note for details.  Spent 35 minutes on Glenny Liu PA-C  Inpatient Diabetes Service  Pager   [phone]  Date of Service 6/26/2023

## 2023-06-26 NOTE — PROGRESS NOTES
[facility] INFECTIOUS DISEASES: FOLLOW UP NOTE     Patient:  Glenny Campos   Date of birth 1965, Medical record number [id number]  Date of Visit:  06/26/2023  Date of Admission: 6/22/2023  Consult Requester:Jhoana Estrella DO          Assessment and Recommendations:     ID Problem List:   1. Right sided dental infection following dental procedure complicated by facial cellulitis with hemimandible abscess (s/p bedside I&D 6/24) and maxillary sinusitis  2. Chronic wound of left foot - 2nd and 3rd digits  3. Positive blood culture - Staph epidermidis, +6/23 in 2/2 sets with repeat NGTD - unclear if true infection vs pathogen  4. History of osteomyelitis of left great toe s/p amputation 2020 through metatarsal head, and distal 2nd toe s/p amputation 05/2023  5. T2DM with polyneuropathy (A1C 6.8)  6. Multiple antibiotics allergies     RECOMMENDATION:   1. Continue current regimen - IV vancomycin, PO levofloxacin, and PO flagyl for now, while pending MRI foot  2. Repeat blood cultures daily   3. Follow up pending cultures - blood / oral abscess  4. Await MRI foot to evaluate for osteomyelitis  5. Appreciate clarification from OMFS/dental team regarding OSH dental procedure - what was done, ?if any exposed bone (pt/family mentions possible use of cadaver bone, but unsure if they meant current or future use?) or if further imaging recommended by dental/OMFS to evaluate any bone involvement  6. Await C diff rule out, however, specimen yet to be collected and sent.  7. Consider outpatient allergy assessment    ASSESSMENT:  Assumed care of Ms. Campos from the weekend ID team.     Glenny Campos is a 57 year old female with past medical history significant for Type 2 DM with retinopathy, peripheral neuropathy, previous left great toe amputation (2020, distal 2nd toe 2023) due to osteomyelitis with  chronic left foot wound who was admitted 6/22 for right sided dental infection following recent dental procedure  (6/20) complicated by facial cellulitis and abscess s/p bedside I&D (culture pending). Hospital course complicated by DKA and positive blood cultures for Staph epidermidis (started on IV vancomycin). Concern also for nonhealing chronic left foot wound for likely osteomyelitis. She has multiple reported antibiotic allergies/intolerances which make choosing a regimen difficult, in addition to the fact that patient refused to take oral antibiotics following her dental procedure and likely also after her recent May toe amputation (outside podiatry concerned for poor healing 6/7). She uses essential oils instead of antibiotics - both topically to foot and ingesting.     We recommend continue current antimicrobial regimen - IV vancomycin (cover Staph epi pending sensitivities, additional gram positive coverage), PO levofloxacin (oral lena, gram negatives) and PO flagyl (anaerobes, oral coverage) to cover dental infection, possible blood infection, and possible left foot osteomyelitis at this time. It is unclear if blood cultures from 6/23 represent true infection (possible from oral source vs foot) vs contaminant as patient remains afebrile, blood cultures collected only due to DKA workup, and repeat cultures collected at start of IV vancomycin are NGTD. Awaiting MRI foot to assess for underlying osteomyelitis and further recommendations accordingly.      Would appreciate if OMFS/dental team could clarify with her outside dental provider if any cadaver bone was used (patient/family report this was mentioned, though not sure if it was truly used or if discussing future needs?), if any current concern for exposed bone around dental infection, or if any further imaging from dental perspective. CT facial bones without obvious bone concern, more soft tissue inflammation and abscess.     Noted ordered Cdiff test as overnight concern of diarrhea per nursing staff. Specimen yet to be collected and sent.     ID team will continue  to follow. Please reach out if any questions or concerns.    Total time spent during this encounter (chart review, documentation, MDM, coordination of care): 50 minutes    Christiana John MD  Infectious Disease Staff Physician  Pager: [phone]  [employer]   Date: 6/26/2023           Interval history:     Patient is seen and examined at bedside today. Resting, limited ROS.          History of Present Illness, as adopted from initial ID consult 5/25/2023:     Glenny Campos is a 57 year old female with past medical history significant for Type 2 DM with retinopathy, peripheral neuropathy, previous left great toe amputation (2020, 2023) and chronic left foot wound who was admitted 6/22 for right facial cellulitis and dental infection, course complicated by DKA.     She presented to ED 6/22 with increased pain, swelling of right face following dental procedure for right 3rd tooth two days prior to admission with tooth pain beginning 6/18. She did not take post-procedure antibiotics as recommended to her and took essential oils instead. She denied fevers, abdominal pain. Had upset stomach after taking pain medication. She endorses nausea, vomiting, and possible chills, denied rigors. Denied drainage in mouth or foul taste.  at bedside and friend Amaris (homeopathic work colleague) on phone provide additional history. Overall, Glenny is a somewhat difficult historian with limited insight on my interview. Amaris more concerned for foot infection as possible source than dental, and wants foot addressed as well.     In ED, labs notable for WBC 12.2, lactic 1.4, , ESR 40. Has remained afebrile, vitals stable. Facial CT with extensive inflammatory changes around R maxillary and mandibular soft tissue with possible small fluid collection. Dental and OMFS consulted - advised I&D, however initially patient refused I&D under local anesthesia or nitrous. She reports multiple antibiotic allergies - penicillins  "(hives, rash), clindamycin and Zosyn at same time had rash, cephalexin (rash), and cephalosporins (unknown). She is unable to describe reactions or rash in detail to me. Mentioned one rash that she could recall was \"red and on her bottom only\", other rashes \"other places\". She denies any history of anaphylaxis. Due to multiple reported allergies, was started on levo + flagyl. Ultimately had I&D on 6/24 by OMFS at bedside, drainage of gross purulence noted and s/p penrose drain placement. Culture from abscess pending.     She has current chronic ulceration of left foot with edema and eschar on 3rd toe. Has previous amputation of left great toe in 2020 due to OM, had 2nd distal toe amputated in May 2023, follows with outside podiatry. Last seen by podiatry on 6/7 - note in [facility]. She had been prescribed PO antibiotics but unclear if she actually took this. She was putting essential oils/\"black salve\" on wounds with concern for worsening. Aerobic swab of drainage from 2nd toe in outside records at time of amputation with - Klebsiella oxytoca, Strep agalactiae, Staph aureus (MSSA), and Haemophilus parainfluenzae. She now has ulceration on left 3rd toe, with continued concern for 2nd toe wound with drainage. She denies worsening pain in foot - does have some sensation, some erythema and edema. 6/8 Foot xray with amputation of great toe and partial 2nd toe, cortical disruption of 3rd toe without obvious osteomyelitis. Ortho consulted here with no plan for intervention, recommend wound care and podiatry.     Blood cultures drawn 6/23 for sepsis and DKA with positive Staph epidermidis in 2/2 sets, negative mecA gene on Verigene. She was started on IV vancomycin 6/24. Repeat blood cultures 6/24 with NGTD. Remains afebrile since admission. MRSA nares negative. She endorses some epigastric and central chest discomfort today, improved after bowel movement. Denies burning or crushing sensation. Feels            Review " of Systems:     Targeted 10-point ROS is negative except as mentioned above in HPI        Antimicrobial history:     Antimicrobials:  Levofloxacin 6/22 - present  Flagyl 6/22 - present  Vancomycin 6/24 - present         Past Medical History:     Past Medical History:   Diagnosis Date     Type 2 diabetes mellitus with diabetic polyneuropathy (H)             Past Surgical History:   History reviewed. No pertinent surgical history.         Family History:     History reviewed. No pertinent family history.         Social History:     Social History     Tobacco Use     Smoking status: Not on file     Smokeless tobacco: Not on file   Substance Use Topics     Alcohol use: Not on file     History   Sexual Activity     Sexual activity: Not on file            Current Medications:       acetaminophen  975 mg Oral Q8H     chlorhexidine  15 mL Swish & Spit BID     insulin aspart  1-10 Units Subcutaneous TID AC     insulin aspart  1-7 Units Subcutaneous At Bedtime     insulin aspart   Subcutaneous TID w/meals     insulin glargine  40 Units Subcutaneous Q24H     levofloxacin  750 mg Intravenous Q24H     lidocaine (PF)  10 mL Infiltration Once     metroNIDAZOLE  500 mg Intravenous Q12H     pantoprazole  40 mg Intravenous Daily with breakfast     sodium chloride (PF)  3 mL Intracatheter Q8H     vancomycin  1,500 mg Intravenous Q12H            Allergies:     Allergies   Allergen Reactions     Cephalosporins      Other reaction(s): *Unknown     Piperacillin-Tazobactam In Dex Hives     Statins Muscle Pain (Myalgia)     Cephalexin Rash     Clindamycin Rash     possible rash, was on zosyn at the same time       Penicillins Hives and Rash            Physical Exam:   Vitals were reviewed  Patient Vitals for the past 24 hrs:   BP Temp Temp src Pulse Resp SpO2 Weight   06/26/23 0548 (!) 160/59 98.8  F (37.1  C) Axillary 77 16 97 % --   06/25/23 2241 (!) 166/70 98  F (36.7  C) Axillary 79 18 99 % --   06/25/23 1355 139/57 97.9  F (36.6  C)  Axillary 70 16 95 % --   06/25/23 1000 -- -- -- -- -- -- 125 kg (275 lb 9.2 oz)       Physical Examination:   Constitutional: Adult female seen sitting up in bed, in NAD. Alert and interactive.   HEENT: NCAT, anicteric sclerae, conjunctiva clear. EOMI without pain. Moist mucous membranes without lesions or thrush. Swelling and erythema over R maxilla infraorbitally with pain to touch, induration. No external drainage. Has swelling to lateral/posterior neck, mild submandibular swelling, no submental swelling. Penrose drain seen in buccal mucosa, edema of lips.  Respiratory: Non-labored breathing, good air exchange. Lungs are clear to auscultation bilaterally, without wheezing, crackles or rhonchi. No cough noted.   Cardiovascular: Regular rate and rhythm with no murmur, rub or gallop.  GI: Normoactive BS. Abdomen is soft, non-distended, and non-tender to palpation. No rigidity or guarding.  Skin: Warm and dry.  Musculoskeletal: Left lower extremity (photos in media tab), deferred exam during this encounter  Neurologic: A &O x3, speech normal, answering questions appropriately. Moves all extremities spontaneously. Grossly non-focal.  Neuropsychiatric: Mentation and affect normal/appropriate.    Lines and devices:  PIV is c/d/i with no erythema, drainage, or tenderness.    Labs, Microbiology and Imaging services are reviewed.          Laboratory Data:     Inflammatory Markers    Recent Labs   Lab Test 06/23/23  1422   SED 40*       Hematology Studies    Recent Labs   Lab Test 06/26/23  0704 06/24/23  0601 06/23/23  0622 06/22/23  1640   WBC 5.0 12.2* 15.1* 12.2*   HGB 12.2 12.8 13.8 14.6   MCV 93 93 92 93    276 251 239       Metabolic Studies     Recent Labs   Lab Test 06/26/23  0704 06/25/23  0756 06/24/23  1426 06/24/23  0601 06/23/23  2049 06/23/23  1422    139  --  137 138 137   POTASSIUM 3.6 3.5 4.0 3.9 3.6 3.8   CHLORIDE 105 105  --  104 105 102   CO2 22 22  --  22 23 20*   BUN 5.4* 6.1  --  8.2 7.8  7.9   CR 0.48* 0.44*  --  0.49* 0.51 0.50*   GFRESTIMATED >90 >90  --  >90 >90 >90       Hepatic Studies    Recent Labs   Lab Test 06/26/23  0704 06/25/23  0756 06/23/23  0622 06/22/23  1640   BILITOTAL 0.2 0.3 0.4 0.5   ALKPHOS 88 94 107* 102   ALBUMIN 3.3* 3.2* 4.0 4.2   AST 20 30 18 16   ALT 11 16 12 5       Microbiology:  Culture   Date Value Ref Range Status   06/25/2023 No growth after 1 day  Preliminary   06/24/2023 No growth after 1 day  Preliminary   06/24/2023 No growth after 1 day  Preliminary   06/24/2023 No anaerobic organisms isolated after 2 days  Preliminary   06/24/2023 Culture in progress  Preliminary   06/23/2023 Positive on the 1st day of incubation (A)  Final   06/23/2023 Staphylococcus epidermidis (AA)  Final     Comment:     1 of 2 bottles   06/23/2023 Positive on the 1st day of incubation (A)  Final   06/23/2023 Staphylococcus epidermidis (AA)  Final     Comment:     2 of 2 bottlesSusceptibilities done on previous cultures   06/23/2023 No Growth  Final       Urine Studies    Recent Labs   Lab Test 06/23/23  0915   LEUKEST Small*   WBCU 24*       IMAGING   CT Facial Bones with Contrast  Result Date: 6/22/2023  Impression: 1. Extensive inflammatory change about the right maxillary and mandibular subcutaneous soft tissues. Questionable small fluid collection along the right hemimandible. 2. Reactive bilateral lymph nodes, noted above. 3. Right maxillary sinusitis without definite dentigerous cyst or odontogenic origin.     Right foot Xray - reviewed

## 2023-06-26 NOTE — PLAN OF CARE
Goal Outcome Evaluation:    AOx4. RA. 6/10 R facial pain, declined pain meds and other interventions. Requested to push afternoon tylenol to 1600. R facial swelling and redness (cellulitis). Up independently in room, up to bathroom. Voids without difficulty. Passing gas. No BM. PIV running with vanco. Poor appetite, declined breakfast, only had beef broth for lunch. , 113. On carb coverage. Hypertensive, 150s/140s.    MRI later today? MRI checklist completed and sent to MRI.

## 2023-06-26 NOTE — PROGRESS NOTES
Hospital Medicine  Patient complains of diarrhea.  Clostridium difficile (C.diff) testing ordered.  Leandro Damian MD

## 2023-06-26 NOTE — PROGRESS NOTES
Patient requesting diet. Started on mechanical soft. Reviewed notes. Did not see a clear indication to not start a diet. Will monitor glucose overnight in setting of DM. Defer further changes to diet to day service.

## 2023-06-27 LAB
ALBUMIN SERPL BCG-MCNC: 3.6 G/DL (ref 3.5–5.2)
ALP SERPL-CCNC: 92 U/L (ref 35–104)
ALT SERPL W P-5'-P-CCNC: 12 U/L (ref 0–50)
ANION GAP SERPL CALCULATED.3IONS-SCNC: 13 MMOL/L (ref 7–15)
AST SERPL W P-5'-P-CCNC: 16 U/L (ref 0–45)
BILIRUB SERPL-MCNC: 0.2 MG/DL
BUN SERPL-MCNC: 5.4 MG/DL (ref 6–20)
CALCIUM SERPL-MCNC: 9 MG/DL (ref 8.6–10)
CHLORIDE SERPL-SCNC: 106 MMOL/L (ref 98–107)
CREAT SERPL-MCNC: 0.49 MG/DL (ref 0.51–0.95)
DEPRECATED HCO3 PLAS-SCNC: 22 MMOL/L (ref 22–29)
ERYTHROCYTE [DISTWIDTH] IN BLOOD BY AUTOMATED COUNT: 12.2 % (ref 10–15)
GFR SERPL CREATININE-BSD FRML MDRD: >90 ML/MIN/1.73M2
GLUCOSE BLDC GLUCOMTR-MCNC: 103 MG/DL (ref 70–99)
GLUCOSE BLDC GLUCOMTR-MCNC: 105 MG/DL (ref 70–99)
GLUCOSE BLDC GLUCOMTR-MCNC: 108 MG/DL (ref 70–99)
GLUCOSE BLDC GLUCOMTR-MCNC: 108 MG/DL (ref 70–99)
GLUCOSE BLDC GLUCOMTR-MCNC: 89 MG/DL (ref 70–99)
GLUCOSE BLDC GLUCOMTR-MCNC: 89 MG/DL (ref 70–99)
GLUCOSE BLDC GLUCOMTR-MCNC: 90 MG/DL (ref 70–99)
GLUCOSE SERPL-MCNC: 84 MG/DL (ref 70–99)
HCT VFR BLD AUTO: 39.5 % (ref 35–47)
HGB BLD-MCNC: 13 G/DL (ref 11.7–15.7)
MCH RBC QN AUTO: 30.6 PG (ref 26.5–33)
MCHC RBC AUTO-ENTMCNC: 32.9 G/DL (ref 31.5–36.5)
MCV RBC AUTO: 93 FL (ref 78–100)
PLATELET # BLD AUTO: 294 10E3/UL (ref 150–450)
POTASSIUM SERPL-SCNC: 3.5 MMOL/L (ref 3.4–5.3)
PROT SERPL-MCNC: 6.7 G/DL (ref 6.4–8.3)
RBC # BLD AUTO: 4.25 10E6/UL (ref 3.8–5.2)
SODIUM SERPL-SCNC: 141 MMOL/L (ref 136–145)
WBC # BLD AUTO: 5.2 10E3/UL (ref 4–11)

## 2023-06-27 PROCEDURE — 36415 COLL VENOUS BLD VENIPUNCTURE: CPT | Performed by: INTERNAL MEDICINE

## 2023-06-27 PROCEDURE — 85027 COMPLETE CBC AUTOMATED: CPT | Performed by: INTERNAL MEDICINE

## 2023-06-27 PROCEDURE — 258N000003 HC RX IP 258 OP 636: Performed by: INTERNAL MEDICINE

## 2023-06-27 PROCEDURE — 120N000002 HC R&B MED SURG/OB UMMC

## 2023-06-27 PROCEDURE — 250N000013 HC RX MED GY IP 250 OP 250 PS 637

## 2023-06-27 PROCEDURE — 99233 SBSQ HOSP IP/OBS HIGH 50: CPT | Performed by: HOSPITALIST

## 2023-06-27 PROCEDURE — 87040 BLOOD CULTURE FOR BACTERIA: CPT | Performed by: INTERNAL MEDICINE

## 2023-06-27 PROCEDURE — 250N000011 HC RX IP 250 OP 636: Mod: JZ | Performed by: INTERNAL MEDICINE

## 2023-06-27 PROCEDURE — 250N000013 HC RX MED GY IP 250 OP 250 PS 637: Performed by: HOSPITALIST

## 2023-06-27 PROCEDURE — 80053 COMPREHEN METABOLIC PANEL: CPT | Performed by: INTERNAL MEDICINE

## 2023-06-27 RX ORDER — LEVOFLOXACIN 750 MG/1
750 TABLET, FILM COATED ORAL DAILY
Status: DISCONTINUED | OUTPATIENT
Start: 2023-06-27 | End: 2023-07-01 | Stop reason: HOSPADM

## 2023-06-27 RX ORDER — DEXTROSE MONOHYDRATE 25 G/50ML
25-50 INJECTION, SOLUTION INTRAVENOUS
Status: DISCONTINUED | OUTPATIENT
Start: 2023-06-27 | End: 2023-06-27

## 2023-06-27 RX ORDER — NICOTINE POLACRILEX 4 MG
15-30 LOZENGE BUCCAL
Status: DISCONTINUED | OUTPATIENT
Start: 2023-06-27 | End: 2023-06-27

## 2023-06-27 RX ORDER — METRONIDAZOLE 500 MG/1
500 TABLET ORAL 2 TIMES DAILY
Status: DISCONTINUED | OUTPATIENT
Start: 2023-06-27 | End: 2023-06-30

## 2023-06-27 RX ADMIN — METRONIDAZOLE 500 MG: 500 TABLET ORAL at 10:16

## 2023-06-27 RX ADMIN — CHLORHEXIDINE GLUCONATE 15 ML: 1.2 SOLUTION ORAL at 20:25

## 2023-06-27 RX ADMIN — LEVOFLOXACIN 750 MG: 750 TABLET, FILM COATED ORAL at 20:25

## 2023-06-27 RX ADMIN — VANCOMYCIN HYDROCHLORIDE 2000 MG: 1 INJECTION, POWDER, LYOPHILIZED, FOR SOLUTION INTRAVENOUS at 01:18

## 2023-06-27 RX ADMIN — METRONIDAZOLE 500 MG: 500 TABLET ORAL at 20:25

## 2023-06-27 ASSESSMENT — ACTIVITIES OF DAILY LIVING (ADL)
ADLS_ACUITY_SCORE: 26

## 2023-06-27 NOTE — PLAN OF CARE
Goal Outcome Evaluation:    AOx4. RA. 6/10 R facial pain and swelling, declined pain meds - using heat pack, rest. Denies nausea. BG 90. Poor appetite. Hypertensive, 150s. Wound care supplies ordered, in pt's room - pt declined wound cares in the morning. Wound cares completed at 1430, next wound care due this evening - preferable post MRI. Generalized dry rash on buttock area, pt denies irritation.  Flagyl and LEVAQUIN switched from IV to PO today.  Currently has no PIV, refused new PIV placement until she talks with ID. Writer notified MD during phone call. Pt still has IV vanco scheduled.    MRI of L foot today some time after 1600.  Pt interested in debriding toe wounds, wondering if MD/team can speak to pt about this?

## 2023-06-27 NOTE — PROGRESS NOTES
IDS inpatient progress note      Assessment:   Glenny Campos is a 57 year old women with past medical history of Type 2 diabetes mellitus (on insulin pump and previously followed with endo) (complicated by retinopathy, neuropathy, prior left toe amputations), obesity, HLD, biliary colic who presents with sepsis secondary to dental infection, sinusitis and facial cellulitis (right sided).    1.  Type 2 diabetes, suboptimally controlled with retinopathy, amputation, neuropathy hemoglobin A1c 6.8 on this admission  2.  Impending diabetic ketoacidosis  VBG ph=7.37, AG=20 and Co2=15  3.  Infection/stress  induced hyperglycemia  4. Elevated BMI  Still no to low oral intake,post I&D, received Lantus 40 units at the bedtime yesterday.  Following that requirement on the insulin drip went down significantly to mostly 0.2 units/h    Plan:    .   -Received Lantus 35 units at the bedtime.  Will decrease to 28 units at 23:00   - Decrease NovoLog carb coverage to 1: 9 g CHO from 1: 7 g CHO with meals and snacks.    -Decrease Novolog high sliding scale to 1:30 at meals and bedtime >140 and >200   -POC BG check 3 times daily AC at the bedtime and at 2 AM.   -hypoglycemia protocol   -recommend diet per primary care with carb counting protocol   -No need for diabetes education, was on OmniPod Dash and Gerard 3 PTA   -on discharge, will recommend outpatient follow up with [facility] endocrinology and CDE    BG Review:  Co2=22, AG=13, creat=0.49      Not really eating.  BG running tight.  She had broth, scrambled eggs and broccoli last night and broth and scrambled eggs with cheese today. She is not planning on eating much.  present. Some antibiotics were changed to oral and she feels slt better.   Sometimes nausea and one loose stool.  Decrease lantus to 28 units.  Still hasn't had her MRI so holding off on putting her pump back on until BG are more stable and she has had MRI.    C02=22, AG=14, creat=0.48        History  of Present Illness:   Glenny Campos is a 57 year old female with past medical history of Type 2 diabetes mellitus (on insulin pump and previously followed with endo) (complicated by retinopathy, neuropathy, prior left toe amputations), obesity, HLD, biliary colic who presents with sepsis secondary to dental infection, sinusitis and facial cellulitis (right sided).    Diabetes Focus:  Glenny Campos presents with Right facial cellulitis post dental procedure( root canal) this past Tuesday.  She has not been able to eat since Tuesday.  She has had pain and low grade fevers.  She has had some vomiting she thinks from the pain medicine.  The nausea and vomiting were worse overnight. She is urinating frequently she thinks from the IV fluids and reports thirst.  Cannot really tell me if this was the case prior to admission.  She says she always runs higher BG overnight.  Her BG in the am have been 150 and then the rest of the day they are 200's.  She cannot tell me if she was running in basal 1 or basal 2.  She says she removed her pod-- she thinks on Wednesday night and did not replace it because she thought she would need an MRI and she thought she needed to come in.  Her BG were initially in the low 200's upon admission and then this morning up to the 300's.  She received Lantus 10 last night and Lantus 10 again this morning with medium sliding scale.  Her presenting Co2=21 and then Co2= 17 this am.  Her AG was 15 and AG 20 this am.  Her ketone this am=4.00 and her xutk=632.   Her VBG has ph=7.37. She has good mentation although teary and ill appearing.       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  With infection of  Third toe, diabetic retinopathy, diabetic neuropathy  Prior to Admission Diabetes Regimen:    Did not tolerate metformin  Gerard 3  Omnipod Dash ( has been wearing this  "since 10/13/2022)    BG monitor: Gerard 3  Frequency of checks: multiple times daily  Omnipod Dash (the following settings were confirmed from the PDM)  Basal 1                       (these are the settings basal 1 which she was using prior to the admission)  00:00-11:29-->  1.05  11:30 am to 17:59-->  1.0  18:00 to 23:59-->  1.05  Total basal=24.85  ISF=1:45  Insulin to carb ratio: 1:15   Target Glucose=100-110  Total Gluc 4 hours    Inactive Basal 2 (switched from the settings to basal 1 prior to the admission)  00:00-06:59--> 1.5  07:00-16:59--> 1  17:00--23:59-->2  Total basal 34.5      Omnipod Dash setting per Epic in 11/2/22    Basal 00:00-09:59 am-->  1.5  10:00 am to 21:59--> 1.0  22:00 to 23:59--> 1.5    ISF=1:55  1:18 carb ratio  Target=110-120    Diet: 2 meals but has not eaten since Tuesday  Usual BG control PTA:  with A1c 6.8 on 6/22/2023  History of DKA:  Less likely DKA but just ketosis  Able to Detect Hypoglycemia: yes she gets sweaty and fatigue  Usual Diabetes Care Provider: Lori Leone MD   Department of Diabetes and Endocrinology   [facility]     Primary Care Provider: Physician No Ref-Primary  Factors Impacting IP Glucose Control: Cellulitis of the right face, not able to eat well due to swelling, prefers supplements rather then medication  Current Diet: full liquid diet        Physical Exam   BP (!) 152/59 (BP Location: Right arm)   Pulse 68   Temp 98.3  F (36.8  C) (Oral)   Resp 18   Ht 1.778 m (5' 10\")   Wt 126.6 kg (279 lb)   SpO2 95%   BMI 40.03 kg/m    General: awake, alert, appears ill,  present at the bedside   HEENT:  Facial swelling and erythema is much improved around eye and improved on check, less on lips.  Lungs: unlabored respiration, no cough  MSK:  moves all extremities,   Mental status:  alert, oriented to self, place, date  Psych:  Normal mood and affect.    Most Recent Laboratory Tests:  Recent Labs   Lab 06/26/23  0704   HGB 12.2 "     Recent Labs   Lab 06/22/23  1640   A1C 6.8*     Recent Labs   Lab 06/26/23  0704   CR 0.48*     Recent Labs   Lab 06/27/23  0205 06/26/23 2224 06/26/23  1717 06/26/23  1251 06/26/23  0741 06/26/23  0704   * 126* 106* 113* 104* 112*     ROUTINE IP LABS (Last four results)  BMP  Recent Labs   Lab 06/27/23  0205 06/26/23 2224 06/26/23 1717 06/26/23  1251 06/26/23  0741 06/26/23  0704 06/25/23  0859 06/25/23  0756 06/24/23  1506 06/24/23  1426 06/24/23  0701 06/24/23  0601 06/23/23 2156 06/23/23 2049   NA  --   --   --   --   --  141  --  139  --   --   --  137  --  138   POTASSIUM  --   --   --   --   --  3.6  --  3.5  --  4.0  --  3.9  --  3.6   CHLORIDE  --   --   --   --   --  105  --  105  --   --   --  104  --  105   JOSE CARLOS  --   --   --   --   --  8.7  --  8.7  --   --   --  9.1  --  9.1   CO2  --   --   --   --   --  22  --  22  --   --   --  22  --  23   BUN  --   --   --   --   --  5.4*  --  6.1  --   --   --  8.2  --  7.8   CR  --   --   --   --   --  0.48*  --  0.44*  --   --   --  0.49*  --  0.51   * 126* 106* 113*   < > 112*   < > 102*   < >  --    < > 138*   < > 178*    < > = values in this interval not displayed.     CBC  Recent Labs   Lab 06/26/23  0704 06/24/23  0601 06/23/23  0622 06/22/23  1640   WBC 5.0 12.2* 15.1* 12.2*   RBC 3.99 4.15 4.43 4.66   HGB 12.2 12.8 13.8 14.6   HCT 37.0 38.4 40.6 43.1   MCV 93 93 92 93   MCH 30.6 30.8 31.2 31.3   MCHC 33.0 33.3 34.0 33.9   RDW 12.3 12.6 12.4 12.1    276 251 239     INR  Recent Labs   Lab 06/23/23  0622   INR 1.30*         trend or trigger automated decision support.     C-Peptide, Serum  Order: [id number]   Ref Range & Units 10 mo ago   C-Peptide 0.5 - 3.3 ng/mL 1.2    Comment: INTERPRETIVE INFORMATION: Serum, C-Peptide     Reference Interval applies to fasting specimens. To convert to   nmol/L, multiply by 0.33   Performed By: [facility]   [address] [place] [id number]   : Guvrinder KELLER  MD Tahir, PhD   Resulting Agency  [facility]     Specimen Collected: 08/25/22  8:26 AM    Performed by: [facility] Last Resulted: 08/27/22  7:49 PM   Received From: [employer]  Result Received: 06/22/23  3:12 PM         Contacting the Inpatient Diabetes Team   From 8AM-4PM: page inpatient diabetes provider that is following the patient, or utilize the job code paging system.   From 4PM-8AM: page the job code for endocrine fellow on call.        Please use the following job code to reach the Inpatient Diabetes team. Note that you must use an in house phone and that job codes cannot receive text pages.     Dial [phone] (or star-star-star [phone] on the new [employer] telephones), then [phone] to reach the endocrine-diabetes provider on call.    Review of prior external note(s) from - [facility] or Care Everywhere    minutes spent on the date of the encounter doing chart review, history and exam, documentation and further activities per the note     Over 50% of my time on the unit was spent counseling the patient and/or coordinating care regarding acute hyperglycemia management.  See note for details.  Spent 35 minutes on Glenny Liu PA-C  Inpatient Diabetes Service  Pager   [phone]  Date of Service 6/27/2023

## 2023-06-27 NOTE — PLAN OF CARE
Goal Outcome Evaluation:      Plan of Care Reviewed With: patient    Overall Patient Progress: improvingOverall Patient Progress: improving    Outcome Evaluation: A&Ox4, VSS on RA. Stool sample obtained, no c-diff, precautions removed. Pt has pain but refuses scheduled Tylenol, PRNs. Using ice on R side of face. Diet changed to mechanical soft, tolerated this well and ate 75% of dinner. Bg stable. L foot MRI not done today, busy, will do tomorrow.

## 2023-06-27 NOTE — PROGRESS NOTES
Progress note,  General Practice Residency     Patient:   Glenny Campos    Date of birth 1965   MRN:  [id number]   Date of Visit:   06/27/2023   Date of Admission 6/22/2023   Consult Requested by Rush Crowell MD       56 yo female pt s/p endodontic procedure ( likely apicoectomy per patient's description). Pt progressing well than when she first presented. Right maxilla swelling is still indurated and tender to palpation, Denied difficulty in breathing or swallowing.  -follow up for drain removal at OMFS clinic per note from 06/24/2023 incision and drainage procedure.    Pt requested follow up address and I attached here.     [facility] - [facility]  [address] [place] [address]  Clinic phone number: [phone]  Clinic fax number: [phone]

## 2023-06-27 NOTE — PLAN OF CARE
"Goal Outcome Evaluation:      Plan of Care Reviewed With: patient    Overall Patient Progress: no changeOverall Patient Progress: no change    Outcome Evaluation: Pt admitted 6/22 with sepsis following dental infection, being treated for R facial cellulitis.  A&O, baseline HTN otherwise vitally stabled overnight, on RA.   Continues to report pain on R side of face, area is red/selling, continues to decline pain meds when asked. Receiving intermittent IV abx, L PIV removed this morning per pt request, site painful - no redness/swelling noted by this writer, refused flagyl dose, wants to talk to ID before doing anymore IV abx, \"arms are not tolerating it\".  Voiding WNL, no BMs overnight.  Pt reporting rash on buttocks relateed to abx, pt not OOB overnigh so was not witnessed by this witer.  Pt requested that 2x2 gauze between L 2nd and 3rd toe be changed, which was done, pt did decline full wound care orders to be done at that time.  Plan for MRI of L foot 6/27, pt hoping to get IV abx changed to PO in AM, calls to make needs known, will contiue to monitor and follow POC    BP (!) 152/59 (BP Location: Right arm)   Pulse 68   Temp 98.3  F (36.8  C) (Oral)   Resp 18   Ht 1.778 m (5' 10\")   Wt 125 kg (275 lb 9.2 oz)   SpO2 95%   BMI 39.54 kg/m      "

## 2023-06-27 NOTE — PROGRESS NOTES
[facility]    Medicine Progress Note - Hospitalist Service, GOLD TEAM 10    Date of Admission:  6/22/2023    Assessment & Plan   58 yo female with history of DM2 on insulin pump complicated by retinopathy, peripheral neuropathy, and prior left toe amputations and chronic left foot wound, obesity, HLD admitted for sepsis 2/2 dental infection and R facial cellulitis. Hospital course complicated by DKA 2/2 infection. Concern for L foot osteomyelitis.     Today:  -ID consulted, appreciate assistance. Discussed with them today.    -Continue current antibiotic regimen    -MRI left foot to evaluate for osteomyelitis pending, will determine antibiotic course after MRI complete.   -Per OMFS: follow up for drain removal at [facility] clinic per note from 06/24/2023 incision and drainage procedure.  -Follow up C. Diff- negative  - Change to PO LQ and Flagyl, continue IV Vanco per ID recs  - Repeat BC Daily, 6/27 sent    Sepsis 2/2 R Canine Space Abscess, Associated Facial Cellulitis and Sinusitis   S/p I&D of a right canine space abscess with placement of a penrose drain (x1) 6/24   Had root canal of R maxillary 1st molar on 6/20 as outpatient which led to above complication. Presented to ED with worsening facial pain and swelling, had leukocytosis and tachycardia at admission.   -Antibiotics (has multiple drug allergies)    -Levofloxacin 6/22 -    -Metronidazole 6/22 -    -Vancomycin 6/24 -   -OMFS consulted, appreciate assistance    -s/p I&D of a right canine space abscess with placement of a penrose drain (x1).   -Warm compress, avoid ice, HOB >30   -Dental team consulted, appreciate assistance    -Recommend 0.12% chlorhexidine glutamate mouthrinse BID  -Pain control: Scheduled acetaminophen, PRN oral oxycodone, PRN IV dilaudid, PRN toradol    -ID consulted, appreciate assistance     Staph Epi + Blood cultures 3/4   Blood cultures drawn 6/23 as a part of sepsis/DKA work-up. 6/24  returned positive for staph Epi (methicillin sensitive). Unclear if represents true bacteremia vs contamination.   -Continue vancomycin 6/24 -   -Follow up repeat blood cultures 6/24 - NGTD   -ID consulted, appreciate assistance    -Daily blood cultures till clearance     Insulin Dependent DM2  Likely DKA 2/2 infection as above, improving DKA   Typically has a continuous glucose monitor and an insulin pump and per notes and per patient's pump she has about 1 unit of humalog per hour. Has had her pump off for about 12 hours prior to admission.   On 6/23 AM, labs showed DKA although pH on VBG was 7.37 (?). AGMA and glucose improved with insulin drip. Serum ketones still elevated, suspect 2/2 alternate cause (starvation ketosis?).   -Endocrine consulted, appreciate assistance   -6/23 - started insulin drip with DKA protocol in AM, with closing gap transitioned to non-DKA insulin drip in evening. Lantus given per endocrine recs.   -6/24 - continued on insulin drip + lantus per endocrine recs/assistance  -6/25 - discontinued insulin drip      Left Third Toe Wound   Concern for Osteomyelitis   With h/o osteomyelitis of prior amputated toes. X-ray without obvious signs of osteo.   -Podiatry/Orthopedics consulted, appreciate assistance    -No evidence of infection   -No urgent interventions prev stated   -Needs ongoing podiatry care  -WOCN Consulted    -Wound cares per their orders   -In addition to podiatry consult, recommend vascular consult. Will place consult pending remainder of hospital course.  -MRI Left forefoot per ID recs        Diet: Mechanical/Dental Soft Diet    DVT Prophylaxis: Pneumatic Compression Devices and Ambulate every shift  Brenner Catheter: Not present  Lines: None     Cardiac Monitoring: None  Code Status: Full Code      Clinically Significant Risk Factors              # Hypoalbuminemia: Lowest albumin = 3.2 g/dL at 6/25/2023  7:56 AM, will monitor as appropriate           # DMII: A1C = 6.8 % (Ref  "range: <5.7 %) within past 6 months   # Severe Obesity: Estimated body mass index is 40.03 kg/m  as calculated from the following:    Height as of this encounter: 1.778 m (5' 10\").    Weight as of this encounter: 126.6 kg (279 lb).           Disposition Plan     Expected Discharge Date: 06/30/2023        Discharge Comments: Pending antibiotics plan, may need to discharge home with Iv antibiotics pending ID Recs          Rush Crowell MD  Hospitalist Service, GOLD TEAM [address] [place] [address]  Securely message with [employer] (more info)  Text page via [facility] Paging/Directory   See signed in provider for up to date coverage information  ______________________________________________________________________    Interval History   No overnight events reported.    + Fatigue and nightmares on current abx regimen.  Awaiting MRI  Wants to talk with OMFS today if they can stop by    Physical Exam   Vital Signs: Temp: 98.1  F (36.7  C) Temp src: Oral BP: (!) 156/63 Pulse: 72   Resp: 18 SpO2: 99 % O2 Device: None (Room air)    Weight: 279 lbs 0 oz    Constitutional: no apparent distress, pleasant and cooperative   Cardiovascular: regular rate and rhythm, normal S1 and S2  Respiratory: clear to auscultation bilaterally, no wheezing, rales, or rhonchi, normal work of breathing   GI: abdomen soft, non tender, non distended, bowel sounds present   Neuro: alert and oriented, no gross focal deficits noted   Skin: Right cheek with are that is firm and erythematous, overall improved erythema and swelling from prior     Medical Decision Making       52 MINUTES SPENT BY ME on the date of service doing chart review, history, exam, documentation & further activities per the note.  MANAGEMENT DISCUSSED with the following over the past 24 hours: antibitotic plan, contacting OMFS if they don't stop by, updating the RN, awaiting MRI.        Data     I have personally reviewed the following data over the past " 24 hrs:    5.2  \   13.0   / 294     141 106 5.4 (L) /  89   3.5 22 0.49 (L) \       ALT: 12 AST: 16 AP: 92 TBILI: 0.2   ALB: 3.6 TOT PROTEIN: 6.7 LIPASE: N/A       Imaging results reviewed over the past 24 hrs:   No results found for this or any previous visit (from the past 24 hour(s)).

## 2023-06-28 ENCOUNTER — APPOINTMENT (OUTPATIENT)
Dept: MRI IMAGING | Facility: CLINIC | Age: 58
DRG: 856 | End: 2023-06-28
Attending: INTERNAL MEDICINE
Payer: COMMERCIAL

## 2023-06-28 LAB
ANION GAP SERPL CALCULATED.3IONS-SCNC: 14 MMOL/L (ref 7–15)
BUN SERPL-MCNC: 4.7 MG/DL (ref 6–20)
CALCIUM SERPL-MCNC: 8.9 MG/DL (ref 8.6–10)
CHLORIDE SERPL-SCNC: 105 MMOL/L (ref 98–107)
CREAT SERPL-MCNC: 0.51 MG/DL (ref 0.51–0.95)
DEPRECATED HCO3 PLAS-SCNC: 23 MMOL/L (ref 22–29)
ERYTHROCYTE [DISTWIDTH] IN BLOOD BY AUTOMATED COUNT: 12 % (ref 10–15)
GFR SERPL CREATININE-BSD FRML MDRD: >90 ML/MIN/1.73M2
GLUCOSE BLDC GLUCOMTR-MCNC: 105 MG/DL (ref 70–99)
GLUCOSE BLDC GLUCOMTR-MCNC: 105 MG/DL (ref 70–99)
GLUCOSE BLDC GLUCOMTR-MCNC: 108 MG/DL (ref 70–99)
GLUCOSE BLDC GLUCOMTR-MCNC: 132 MG/DL (ref 70–99)
GLUCOSE BLDC GLUCOMTR-MCNC: 139 MG/DL (ref 70–99)
GLUCOSE BLDC GLUCOMTR-MCNC: 80 MG/DL (ref 70–99)
GLUCOSE BLDC GLUCOMTR-MCNC: 88 MG/DL (ref 70–99)
GLUCOSE BLDC GLUCOMTR-MCNC: 99 MG/DL (ref 70–99)
GLUCOSE SERPL-MCNC: 93 MG/DL (ref 70–99)
HCT VFR BLD AUTO: 37.7 % (ref 35–47)
HGB BLD-MCNC: 12.5 G/DL (ref 11.7–15.7)
MAGNESIUM SERPL-MCNC: 1.9 MG/DL (ref 1.7–2.3)
MCH RBC QN AUTO: 30.6 PG (ref 26.5–33)
MCHC RBC AUTO-ENTMCNC: 33.2 G/DL (ref 31.5–36.5)
MCV RBC AUTO: 92 FL (ref 78–100)
PHOSPHATE SERPL-MCNC: 4.3 MG/DL (ref 2.5–4.5)
PLATELET # BLD AUTO: 261 10E3/UL (ref 150–450)
POTASSIUM SERPL-SCNC: 3.5 MMOL/L (ref 3.4–5.3)
RBC # BLD AUTO: 4.08 10E6/UL (ref 3.8–5.2)
SODIUM SERPL-SCNC: 142 MMOL/L (ref 136–145)
VANCOMYCIN SERPL-MCNC: <4 UG/ML
WBC # BLD AUTO: 5.2 10E3/UL (ref 4–11)

## 2023-06-28 PROCEDURE — 73720 MRI LWR EXTREMITY W/O&W/DYE: CPT | Mod: LT

## 2023-06-28 PROCEDURE — 80202 ASSAY OF VANCOMYCIN: CPT | Performed by: HOSPITALIST

## 2023-06-28 PROCEDURE — 73720 MRI LWR EXTREMITY W/O&W/DYE: CPT | Mod: 26 | Performed by: RADIOLOGY

## 2023-06-28 PROCEDURE — 250N000013 HC RX MED GY IP 250 OP 250 PS 637: Performed by: HOSPITALIST

## 2023-06-28 PROCEDURE — 250N000012 HC RX MED GY IP 250 OP 636 PS 637: Performed by: PHYSICIAN ASSISTANT

## 2023-06-28 PROCEDURE — 84100 ASSAY OF PHOSPHORUS: CPT | Performed by: HOSPITALIST

## 2023-06-28 PROCEDURE — A9585 GADOBUTROL INJECTION: HCPCS | Mod: JZ | Performed by: HOSPITALIST

## 2023-06-28 PROCEDURE — 87040 BLOOD CULTURE FOR BACTERIA: CPT | Performed by: HOSPITALIST

## 2023-06-28 PROCEDURE — 83735 ASSAY OF MAGNESIUM: CPT | Performed by: HOSPITALIST

## 2023-06-28 PROCEDURE — 99233 SBSQ HOSP IP/OBS HIGH 50: CPT | Performed by: HOSPITALIST

## 2023-06-28 PROCEDURE — 250N000011 HC RX IP 250 OP 636: Mod: JZ | Performed by: INTERNAL MEDICINE

## 2023-06-28 PROCEDURE — 999N000127 HC STATISTIC PERIPHERAL IV START W US GUIDANCE

## 2023-06-28 PROCEDURE — 258N000003 HC RX IP 258 OP 636: Performed by: INTERNAL MEDICINE

## 2023-06-28 PROCEDURE — 36415 COLL VENOUS BLD VENIPUNCTURE: CPT | Performed by: HOSPITALIST

## 2023-06-28 PROCEDURE — 80048 BASIC METABOLIC PNL TOTAL CA: CPT | Performed by: HOSPITALIST

## 2023-06-28 PROCEDURE — 255N000002 HC RX 255 OP 636: Mod: JZ | Performed by: HOSPITALIST

## 2023-06-28 PROCEDURE — 85027 COMPLETE CBC AUTOMATED: CPT | Performed by: HOSPITALIST

## 2023-06-28 PROCEDURE — 99232 SBSQ HOSP IP/OBS MODERATE 35: CPT | Performed by: PHYSICIAN ASSISTANT

## 2023-06-28 PROCEDURE — 250N000013 HC RX MED GY IP 250 OP 250 PS 637

## 2023-06-28 PROCEDURE — 120N000002 HC R&B MED SURG/OB UMMC

## 2023-06-28 RX ORDER — GADOBUTROL 604.72 MG/ML
10 INJECTION INTRAVENOUS ONCE
Status: COMPLETED | OUTPATIENT
Start: 2023-06-28 | End: 2023-06-28

## 2023-06-28 RX ADMIN — METRONIDAZOLE 500 MG: 500 TABLET ORAL at 09:55

## 2023-06-28 RX ADMIN — VANCOMYCIN HYDROCHLORIDE 2000 MG: 1 INJECTION, POWDER, LYOPHILIZED, FOR SOLUTION INTRAVENOUS at 19:33

## 2023-06-28 RX ADMIN — LEVOFLOXACIN 750 MG: 750 TABLET, FILM COATED ORAL at 19:27

## 2023-06-28 RX ADMIN — METRONIDAZOLE 500 MG: 500 TABLET ORAL at 19:27

## 2023-06-28 RX ADMIN — CHLORHEXIDINE GLUCONATE 15 ML: 1.2 SOLUTION ORAL at 19:27

## 2023-06-28 RX ADMIN — VANCOMYCIN HYDROCHLORIDE 2000 MG: 1 INJECTION, POWDER, LYOPHILIZED, FOR SOLUTION INTRAVENOUS at 13:05

## 2023-06-28 RX ADMIN — GADOBUTROL 10 ML: 604.72 INJECTION INTRAVENOUS at 11:39

## 2023-06-28 RX ADMIN — INSULIN GLARGINE 20 UNITS: 100 INJECTION, SOLUTION SUBCUTANEOUS at 22:42

## 2023-06-28 ASSESSMENT — ACTIVITIES OF DAILY LIVING (ADL)
ADLS_ACUITY_SCORE: 24

## 2023-06-28 NOTE — PHARMACY-VANCOMYCIN DOSING SERVICE
Drug Level Evaluation  Patient is currently receiving Vancomycin .  A level was drawn at 6/28/23 on 0815. The measured level result is <4 mg/L  This medication level is invalid because the pt has missed the last several doses of vancomycin (refusing).  No further assessment can be made at this time.  Continue current regimen.  Recheck level other   Pharmacy team will continue to follow.    Jorge A LópezD, [facility]    [phone]  Pager [phone]

## 2023-06-28 NOTE — PLAN OF CARE
Afebrile, VSS on RA.  Alert and oriented x4.  Declined pain medication for right side facial pain.  Denies nausea.   L PIV infusing TKO.   and 108.  Appetite poor.  Voiding but not saving.  No BM this shift.   L toe dressing CDI.  Calls to make needs known.  Up independently to bathroom.   at bedside.  Continue plan of care.

## 2023-06-28 NOTE — PROGRESS NOTES
[facility]    Medicine Progress Note - Hospitalist Service, GOLD TEAM 10    Date of Admission:  6/22/2023    Assessment & Plan   58 yo female with history of DM2 on insulin pump complicated by retinopathy, peripheral neuropathy, and prior left toe amputations and chronic left foot wound, obesity, HLD admitted for sepsis 2/2 dental infection and R facial cellulitis. Hospital course complicated by DKA 2/2 infection. Concern for L foot osteomyelitis.     Today:  -ID consulted, appreciate assistance. Discussed with them today.    -Continue current antibiotic regimen    -MRI left foot to evaluate for osteomyelitis pending for 6/28 @ 10 am   >> will determine antibiotic course after MRI complete.   -Per OMFS: follow up for drain removal at OMFS clinic per note from 06/24/2023 incision and drainage procedure.  -Continue PO LQ and Flagyl, continue IV Vanco per ID recs  -Repeat BC Daily  -Place new PIV for IV Vanco    Sepsis 2/2 R Canine Space Abscess, Associated Facial Cellulitis and Sinusitis   S/p I&D of a right canine space abscess with placement of a penrose drain (x1) 6/24   Had root canal of R maxillary 1st molar on 6/20 as outpatient which led to above complication. Presented to ED with worsening facial pain and swelling, had leukocytosis and tachycardia at admission.   -Antibiotics (has multiple drug allergies)    -Levofloxacin 6/22 -    -Metronidazole 6/22 -    -Vancomycin 6/24 -   -OMFS consulted, appreciate assistance    -s/p I&D of a right canine space abscess with placement of a penrose drain (x1).   -Warm compress, avoid ice, HOB >30   -Dental team consulted, appreciate assistance    -Recommend 0.12% chlorhexidine glutamate mouthrinse BID  -Pain control: Scheduled acetaminophen, PRN oral oxycodone, PRN IV dilaudid, PRN toradol    -ID consulted, appreciate assistance     Staph Epi + Blood cultures 3/4   Blood cultures drawn 6/23 as a part of sepsis/DKA work-up. 6/24  returned positive for staph Epi (methicillin sensitive). Unclear if represents true bacteremia vs contamination.   -Continue vancomycin 6/24 -   -Follow up repeat blood cultures 6/24 - NGTD   -ID consulted, appreciate assistance    -Daily blood cultures till clearance     Insulin Dependent DM2  Likely DKA 2/2 infection as above, improving DKA   Typically has a continuous glucose monitor and an insulin pump and per notes and per patient's pump she has about 1 unit of humalog per hour. Has had her pump off for about 12 hours prior to admission.   On 6/23 AM, labs showed DKA although pH on VBG was 7.37 (?). AGMA and glucose improved with insulin drip. Serum ketones still elevated, suspect 2/2 alternate cause (starvation ketosis?).   -Endocrine consulted, appreciate assistance   -6/23 - started insulin drip with DKA protocol in AM, with closing gap transitioned to non-DKA insulin drip in evening. Lantus given per endocrine recs.   -6/24 - continued on insulin drip + lantus per endocrine recs/assistance  -6/25 - discontinued insulin drip      Left Third Toe Wound   Concern for Osteomyelitis   With h/o osteomyelitis of prior amputated toes. X-ray without obvious signs of osteo.   -Podiatry/Orthopedics consulted, appreciate assistance    -No evidence of infection   -No urgent interventions prev stated   -Needs ongoing podiatry care  -WOCN Consulted    -Wound cares per their orders   -In addition to podiatry consult, recommend vascular consult. Will place consult pending remainder of hospital course.  -MRI Left forefoot per ID recs        Diet: Mechanical/Dental Soft Diet    DVT Prophylaxis: Pneumatic Compression Devices and Ambulate every shift  Brenner Catheter: Not present  Lines: None     Cardiac Monitoring: None  Code Status: Full Code      Clinically Significant Risk Factors              # Hypoalbuminemia: Lowest albumin = 3.2 g/dL at 6/25/2023  7:56 AM, will monitor as appropriate           # DMII: A1C = 6.8 % (Ref  "range: <5.7 %) within past 6 months   # Obesity: Estimated body mass index is 39.44 kg/m  as calculated from the following:    Height as of this encounter: 1.778 m (5' 10\").    Weight as of this encounter: 124.7 kg (274 lb 14.4 oz).           Disposition Plan     Expected Discharge Date: 06/30/2023        Discharge Comments: Pending antibiotics plan, may need to discharge home with Iv antibiotics pending ID Recs          Rush Crowell MD  Hospitalist Service, [address]  Securely message with [employer] (more info)  Text page via [facility] Paging/Directory   See signed in provider for up to date coverage information  ______________________________________________________________________    Interval History   No overnight events reported.    Awaiting MRI  No new complaints this AM.  Reviewing FMLA for     Physical Exam   Vital Signs: Temp: 98.9  F (37.2  C) Temp src: Oral BP: (!) 142/54 Pulse: 64   Resp: 14 SpO2: 95 % O2 Device: None (Room air)    Weight: 274 lbs 14.4 oz    Constitutional: no apparent distress, pleasant and cooperative   Cardiovascular: regular rate and rhythm, normal S1 and S2  Respiratory: clear to auscultation bilaterally, no wheezing, rales, or rhonchi, normal work of breathing   GI: abdomen soft, non tender, non distended, bowel sounds present   Neuro: alert and oriented, no gross focal deficits noted   Skin: Right cheek with are that is firm and erythematous, overall improved erythema and swelling from prior     Medical Decision Making       52 MINUTES SPENT BY ME on the date of service doing chart review, history, exam, documentation & further activities per the note.  MANAGEMENT DISCUSSED with the following over the past 24 hours: antibitotic plan, contacting OMFS if they don't stop by, updating the RN, awaiting MRI.        Data     I have personally reviewed the following data over the past 24 hrs:    5.2  \   12.5   / 261     142 " 105 4.7 (L) /  105 (H)   3.5 23 0.51 \       ALT: N/A AST: N/A AP: N/A TBILI: N/A   ALB: N/A TOT PROTEIN: N/A LIPASE: N/A       Imaging results reviewed over the past 24 hrs:   No results found for this or any previous visit (from the past 24 hour(s)).

## 2023-06-28 NOTE — PLAN OF CARE
Goal Outcome Evaluation:      Plan of Care Reviewed With: patient    Overall Patient Progress: no changeOverall Patient Progress: no change    Outcome Evaluation: Pt. A/OX4, R side of face remains swollen and is unchanged.  Pt utilizing heat for pain instead of medications. Per pt, the medications don't seem to help.  Pt up independently in room.  MRI of L foot still pending.  Pt taking oral abx. Currently does not have a PIV and would like to speak to ID prior to having another one placed.  IV vancomyocin will not be able to be given. Per previous RN, team aware of this. Dressing to L foot changed with scant serosanguinous drainage noted.

## 2023-06-28 NOTE — PLAN OF CARE
"Goal Outcome Evaluation:      Plan of Care Reviewed With: patient    Overall Patient Progress: improvingOverall Patient Progress: improving    Outcome Evaluation: Admitted 6/24 with sepsis and R facial cellulitis r/t dental infection.  No major changes overnight, pt has been A&O, baseline HTN otherwise VSS on RA, and up IND to bathroom.  R side of face continues to be swollen but slightly improved for previous day.  Pain managed with heat, declined pain medications.  Continues to refuse new IV placement, unable to give IV vanco.  Declined dressing change on L toe wounds this shift, rash minimal on buttocks.  BG down to 80 at 0200, pt ate apple sauce at this time, BG up to 88 following CHOs and 99 this AM.  LBM 6/27 per pt report, voiding WNL.  Calls to make needs known, will continue to monitor and follow POC    BP (!) 142/54 (BP Location: Left arm)   Pulse 64   Temp 98.9  F (37.2  C) (Oral)   Resp 14   Ht 1.778 m (5' 10\")   Wt 126.6 kg (279 lb)   SpO2 95%   BMI 40.03 kg/m         "

## 2023-06-28 NOTE — PROGRESS NOTES
IDS inpatient progress note      Assessment:   Glenny Campos is a 57 year old women with past medical history of Type 2 diabetes mellitus (on insulin pump and previously followed with endo) (complicated by retinopathy, neuropathy, prior left toe amputations), obesity, HLD, biliary colic who presents with sepsis secondary to dental infection, sinusitis and facial cellulitis (right sided).    1.  Type 2 diabetes, suboptimally controlled with retinopathy, amputation, neuropathy hemoglobin A1c 6.8 on this admission  2.  Impending diabetic ketoacidosis  VBG ph=7.37, AG=20 and Co2=15, resolved  3.  Infection/stress  induced hyperglycemia  4. Elevated BMI      Plan:    .   -Received Lantus 28 units at the bedtime.  Will decrease to 20 units at 23:00   -  NovoLog carb coverage to 1: 10 g CHO  with meals and snacks. Consider increasing to 1:12 tomorrow   -Continue Novolog medium sliding scale to  at meals and bedtime >140 and >200   -POC BG check 3 times daily AC at the bedtime and at 2 AM.   -hypoglycemia protocol   -recommend diet per primary care with carb counting protocol   -No need for diabetes education, was on OmniPod Dash and Gerard 3 PTA   -on discharge, will recommend outpatient follow up with [facility] endocrinology and CDE    BG Review:  Co2=23, AG=14, creat=0.51      Not really eating.  BG running tight. She had a lower BG at 80. She had broth,  She is not planning on eating much.  present.  Decrease lantus to 20 units.  had her MRI still holding off on putting her pump back on until BG are more stable,  Feels fatigued today      History of Present Illness:   Glenny Campos is a 57 year old female with past medical history of Type 2 diabetes mellitus (on insulin pump and previously followed with endo) (complicated by retinopathy, neuropathy, prior left toe amputations), obesity, HLD, biliary colic who presents with sepsis secondary to dental infection, sinusitis and facial cellulitis (right  sided).    Diabetes Focus:  Glenny Campos presents with Right facial cellulitis post dental procedure( root canal) this past Tuesday.  She has not been able to eat since Tuesday.  She has had pain and low grade fevers.  She has had some vomiting she thinks from the pain medicine.  The nausea and vomiting were worse overnight. She is urinating frequently she thinks from the IV fluids and reports thirst.  Cannot really tell me if this was the case prior to admission.  She says she always runs higher BG overnight.  Her BG in the am have been 150 and then the rest of the day they are 200's.  She cannot tell me if she was running in basal 1 or basal 2.  She says she removed her pod-- she thinks on Wednesday night and did not replace it because she thought she would need an MRI and she thought she needed to come in.  Her BG were initially in the low 200's upon admission and then this morning up to the 300's.  She received Lantus 10 last night and Lantus 10 again this morning with medium sliding scale.  Her presenting Co2=21 and then Co2= 17 this am.  Her AG was 15 and AG 20 this am.  Her ketone this am=4.00 and her attu=970.   Her VBG has ph=7.37. She has good mentation although teary and ill appearing.       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  With infection of  Third toe, diabetic retinopathy, diabetic neuropathy  Prior to Admission Diabetes Regimen:    Did not tolerate metformin  Gerard 3  Omnipod Dash ( has been wearing this since 10/13/2022)    BG monitor: Gerard 3  Frequency of checks: multiple times daily  Omnipod Dash (the following settings were confirmed from the PDM)  Basal 1                       (these are the settings basal 1 which she was using prior to the admission)  00:00-11:29-->  1.05  11:30 am to 17:59-->  1.0  18:00 to 23:59-->  1.05  Total  "basal=24.85  ISF=1:45  Insulin to carb ratio: 1:15   Target Glucose=100-110  Total Gluc 4 hours    Inactive Basal 2 (switched from the settings to basal 1 prior to the admission)  00:00-06:59--> 1.5  07:00-16:59--> 1  17:00--23:59-->2  Total basal 34.5      Omnipod Dash setting per Epic in 11/2/22    Basal 00:00-09:59 am-->  1.5  10:00 am to 21:59--> 1.0  22:00 to 23:59--> 1.5    ISF=1:55  1:18 carb ratio  Target=110-120    Diet: 2 meals but has not eaten since Tuesday  Usual BG control PTA:  with A1c 6.8 on 6/22/2023  History of DKA:  Less likely DKA but just ketosis  Able to Detect Hypoglycemia: yes she gets sweaty and fatigue  Usual Diabetes Care Provider: Lori Leone MD   Department of Diabetes and Endocrinology   [facility]     Primary Care Provider: Physician No Ref-Primary  Factors Impacting IP Glucose Control: Cellulitis of the right face, not able to eat well due to swelling, prefers supplements rather then medication  Current Diet: full liquid diet        Physical Exam   BP (!) 142/54 (BP Location: Left arm)   Pulse 64   Temp 98.9  F (37.2  C) (Oral)   Resp 14   Ht 1.778 m (5' 10\")   Wt 126.6 kg (279 lb)   SpO2 95%   BMI 40.03 kg/m    General: awake, alert, appears ill,  present at the bedside   HEENT:  Facial swelling and erythema is resolved around eye and improved on check, improved on lips.  Lungs: unlabored respiration, no cough  MSK:  moves all extremities,   Mental status:  alert, oriented to self, place, date  Psych:  Normal mood and affect.    Most Recent Laboratory Tests:  Recent Labs   Lab 06/27/23  0943   HGB 13.0     Recent Labs   Lab 06/22/23  1640   A1C 6.8*     Recent Labs   Lab 06/27/23  0942   CR 0.49*     Recent Labs   Lab 06/28/23  0535 06/28/23  0225 06/28/23  0143 06/27/23  2156 06/27/23  2024 06/27/23  1754   GLC 99 88 80 108* 103* 89     ROUTINE IP LABS (Last four results)  BMP  Recent Labs   Lab 06/28/23  0535 06/28/23  0225 " 06/28/23  0143 06/27/23  2156 06/27/23  1247 06/27/23  0942 06/26/23  0741 06/26/23  0704 06/25/23  0859 06/25/23  0756 06/24/23  1506 06/24/23  1426 06/24/23  0701 06/24/23  0601   NA  --   --   --   --   --  141  --  141  --  139  --   --   --  137   POTASSIUM  --   --   --   --   --  3.5  --  3.6  --  3.5  --  4.0  --  3.9   CHLORIDE  --   --   --   --   --  106  --  105  --  105  --   --   --  104   JOSE CARLOS  --   --   --   --   --  9.0  --  8.7  --  8.7  --   --   --  9.1   CO2  --   --   --   --   --  22  --  22  --  22  --   --   --  22   BUN  --   --   --   --   --  5.4*  --  5.4*  --  6.1  --   --   --  8.2   CR  --   --   --   --   --  0.49*  --  0.48*  --  0.44*  --   --   --  0.49*   GLC 99 88 80 108*   < > 84   < > 112*   < > 102*   < >  --    < > 138*    < > = values in this interval not displayed.     CBC  Recent Labs   Lab 06/27/23  0943 06/26/23  0704 06/24/23  0601 06/23/23 0622   WBC 5.2 5.0 12.2* 15.1*   RBC 4.25 3.99 4.15 4.43   HGB 13.0 12.2 12.8 13.8   HCT 39.5 37.0 38.4 40.6   MCV 93 93 93 92   MCH 30.6 30.6 30.8 31.2   MCHC 32.9 33.0 33.3 34.0   RDW 12.2 12.3 12.6 12.4    231 276 251     INR  Recent Labs   Lab 06/23/23 0622   INR 1.30*         trend or trigger automated decision support.     C-Peptide, Serum  Order: [id number]   Ref Range & Units 10 mo ago   C-Peptide 0.5 - 3.3 ng/mL 1.2    Comment: INTERPRETIVE INFORMATION: Serum, C-Peptide     Reference Interval applies to fasting specimens. To convert to   nmol/L, multiply by 0.33   Performed By: [facility]   [address] [place] [address]   : Gurvinder Haro MD, PhD   Resulting Agency  [facility]     Specimen Collected: 08/25/22  8:26 AM    Performed by: [facility] Last Resulted: 08/27/22  7:49 PM   Received From: [facility]  Result Received: 06/22/23  3:12 PM         Contacting the Inpatient Diabetes Team   From 8AM-4PM: page inpatient diabetes provider that is following the  patient, or utilize the job code paging system.   From 4PM-8AM: page the job code for endocrine fellow on call.        Please use the following job code to reach the Inpatient Diabetes team. Note that you must use an in house phone and that job codes cannot receive text pages.     Dial [phone] (or star-star-star [phone] on the new [employer] telephones), then [phone] to reach the endocrine-diabetes provider on call.    Review of prior external note(s) from - [facility] or Care Everywhere    minutes spent on the date of the encounter doing chart review, history and exam, documentation and further activities per the note     Over 50% of my time on the unit was spent counseling the patient and/or coordinating care regarding acute hyperglycemia management.  See note for details.  Spent 35 minutes on Glenny Campos today     Estrella Liu PA-C  Inpatient Diabetes Service  Pager   [phone]  Date of Service 6/28/2023

## 2023-06-28 NOTE — PROVIDER NOTIFICATION
Paged Gold 10 Dr. Crowell:   Pt is requesting an update re: MRI results and asking if Infectious Disease is coming today. Thank you, Nery ALCARAZ [phone]

## 2023-06-29 ENCOUNTER — APPOINTMENT (OUTPATIENT)
Dept: ULTRASOUND IMAGING | Facility: CLINIC | Age: 58
DRG: 856 | End: 2023-06-29
Attending: NURSE PRACTITIONER
Payer: COMMERCIAL

## 2023-06-29 ENCOUNTER — HOME INFUSION (PRE-WILLOW HOME INFUSION) (OUTPATIENT)
Dept: PHARMACY | Facility: CLINIC | Age: 58
End: 2023-06-29

## 2023-06-29 LAB
ANION GAP SERPL CALCULATED.3IONS-SCNC: 10 MMOL/L (ref 7–15)
BACTERIA BLD CULT: NO GROWTH
BACTERIA BLD CULT: NO GROWTH
BUN SERPL-MCNC: 5 MG/DL (ref 6–20)
CALCIUM SERPL-MCNC: 8.6 MG/DL (ref 8.6–10)
CHLORIDE SERPL-SCNC: 106 MMOL/L (ref 98–107)
CREAT SERPL-MCNC: 0.5 MG/DL (ref 0.51–0.95)
DEPRECATED HCO3 PLAS-SCNC: 25 MMOL/L (ref 22–29)
GFR SERPL CREATININE-BSD FRML MDRD: >90 ML/MIN/1.73M2
GLUCOSE BLDC GLUCOMTR-MCNC: 109 MG/DL (ref 70–99)
GLUCOSE BLDC GLUCOMTR-MCNC: 123 MG/DL (ref 70–99)
GLUCOSE BLDC GLUCOMTR-MCNC: 93 MG/DL (ref 70–99)
GLUCOSE BLDC GLUCOMTR-MCNC: 99 MG/DL (ref 70–99)
GLUCOSE SERPL-MCNC: 114 MG/DL (ref 70–99)
HOLD SPECIMEN: NORMAL
PHOSPHATE SERPL-MCNC: 3.7 MG/DL (ref 2.5–4.5)
POTASSIUM SERPL-SCNC: 3.5 MMOL/L (ref 3.4–5.3)
SODIUM SERPL-SCNC: 141 MMOL/L (ref 136–145)

## 2023-06-29 PROCEDURE — 99233 SBSQ HOSP IP/OBS HIGH 50: CPT | Performed by: HOSPITALIST

## 2023-06-29 PROCEDURE — 99418 PROLNG IP/OBS E/M EA 15 MIN: CPT | Performed by: STUDENT IN AN ORGANIZED HEALTH CARE EDUCATION/TRAINING PROGRAM

## 2023-06-29 PROCEDURE — 93922 UPR/L XTREMITY ART 2 LEVELS: CPT | Mod: 26 | Performed by: RADIOLOGY

## 2023-06-29 PROCEDURE — 80048 BASIC METABOLIC PNL TOTAL CA: CPT | Performed by: HOSPITALIST

## 2023-06-29 PROCEDURE — 99233 SBSQ HOSP IP/OBS HIGH 50: CPT | Performed by: STUDENT IN AN ORGANIZED HEALTH CARE EDUCATION/TRAINING PROGRAM

## 2023-06-29 PROCEDURE — 99222 1ST HOSP IP/OBS MODERATE 55: CPT | Performed by: PODIATRIST

## 2023-06-29 PROCEDURE — 93925 LOWER EXTREMITY STUDY: CPT

## 2023-06-29 PROCEDURE — 93925 LOWER EXTREMITY STUDY: CPT | Mod: 26 | Performed by: RADIOLOGY

## 2023-06-29 PROCEDURE — 250N000011 HC RX IP 250 OP 636: Mod: JZ | Performed by: INTERNAL MEDICINE

## 2023-06-29 PROCEDURE — 120N000002 HC R&B MED SURG/OB UMMC

## 2023-06-29 PROCEDURE — 93922 UPR/L XTREMITY ART 2 LEVELS: CPT

## 2023-06-29 PROCEDURE — 84100 ASSAY OF PHOSPHORUS: CPT | Performed by: HOSPITALIST

## 2023-06-29 PROCEDURE — 250N000013 HC RX MED GY IP 250 OP 250 PS 637: Performed by: HOSPITALIST

## 2023-06-29 PROCEDURE — 36415 COLL VENOUS BLD VENIPUNCTURE: CPT | Performed by: HOSPITALIST

## 2023-06-29 PROCEDURE — 258N000003 HC RX IP 258 OP 636: Performed by: INTERNAL MEDICINE

## 2023-06-29 PROCEDURE — 250N000013 HC RX MED GY IP 250 OP 250 PS 637

## 2023-06-29 RX ADMIN — METRONIDAZOLE 500 MG: 500 TABLET ORAL at 08:11

## 2023-06-29 RX ADMIN — METRONIDAZOLE 500 MG: 500 TABLET ORAL at 20:03

## 2023-06-29 RX ADMIN — VANCOMYCIN HYDROCHLORIDE 2000 MG: 1 INJECTION, POWDER, LYOPHILIZED, FOR SOLUTION INTRAVENOUS at 08:11

## 2023-06-29 RX ADMIN — LEVOFLOXACIN 750 MG: 750 TABLET, FILM COATED ORAL at 20:03

## 2023-06-29 RX ADMIN — CHLORHEXIDINE GLUCONATE 15 ML: 1.2 SOLUTION ORAL at 08:12

## 2023-06-29 RX ADMIN — INSULIN ASPART 1 UNITS: 100 INJECTION, SOLUTION INTRAVENOUS; SUBCUTANEOUS at 18:48

## 2023-06-29 RX ADMIN — VANCOMYCIN HYDROCHLORIDE 2000 MG: 1 INJECTION, POWDER, LYOPHILIZED, FOR SOLUTION INTRAVENOUS at 20:01

## 2023-06-29 ASSESSMENT — ACTIVITIES OF DAILY LIVING (ADL)
ADLS_ACUITY_SCORE: 24
DEPENDENT_IADLS:: INDEPENDENT
ADLS_ACUITY_SCORE: 24

## 2023-06-29 NOTE — PROGRESS NOTES
Therapy: IV abx  Insurance: [employer]  Ded: $1375  Met: $1375  Co-Insurance: 80/20  Max Out of Pocket: $2875  Met: $2875    In reference to admission on 6/22/23 to check IV abx coverage     Please contact Intake with any questions, [phone] or In Basket pool, [facility] ([phone]).

## 2023-06-29 NOTE — PROGRESS NOTES
IDS inpatient progress note      Assessment:   Glenny Campos is a 57 year old women with past medical history of Type 2 diabetes mellitus (on insulin pump and previously followed with endo) (complicated by retinopathy, neuropathy, prior left toe amputations), obesity, HLD, biliary colic who presents with sepsis secondary to dental infection, sinusitis and facial cellulitis (right sided).    1.  Type 2 diabetes, suboptimally controlled with retinopathy, amputation, neuropathy hemoglobin A1c 6.8 on this admission  2.  Impending diabetic ketoacidosis  VBG ph=7.37, AG=20 and Co2=15, resolved  3.  Infection/stress  induced hyperglycemia  4. Elevated BMI      Plan:     Waiting for ID recs, post vascular surgery recs, and waiting for results of arterial study.  Seen by podiatry.  Per podiatry MRI does show osteomyelitis of the distal phalanx of the third digit.   -  Had planned to resume Omnipod Dash today with following settings but will now wait until tomorrow:  Basal 1   00:00-11:29-->  1.05--->  0.75  11:30 am to 17:59-->  1.0--> 0.75  18:00 to 23:59-->  1.05-->0.75  Total basal=24.85-->18 units  ISF=1:45--->1:60  Insulin to carb ratio: 1:15   Target Glucose=100-110  Total Gluc 4 hours      Plan for now with Basal/bolus insulin injections:   -Received Lantus 20 units at the bedtime.  Will decrease to 18 units at 23:00 today   -  NovoLog carb coverage to 1: 10 g CHO  with meals and snacks, will decrease to 1:15 today.   -Continue Novolog medium sliding scale to  at meals and bedtime >140 and >200   -POC BG check 3 times daily AC at the bedtime and at 2 AM.   -hypoglycemia protocol   -recommend diet per primary care with carb counting protocol   -No need for diabetes education, was on OmniPod Dash and Gerard 3 PTA   -on discharge, will recommend outpatient follow up with [facility] endocrinology and CDE      Discussed plan with RN, patient and her , and paged primary, waiting for response.    BG  Review:  Co2=25, AG=10, creat=0.50      BG still  Running 109-139 . Lowest BG at 80. She has been off the floor for arterial study.  She is working on cleaning up her eating.  I encouraged her to eat.  She is not  eating much.  present.  Decrease lantus to 18 units.  Hold off on ambulatory pump today and plan for tomorrow.  She has pod, reanna, and new box of humalog insulin although would prefer to get insulin from pharm so it can be scanned in and documented.      History of Present Illness:   Glenny Campos is a 57 year old female with past medical history of Type 2 diabetes mellitus (on insulin pump and previously followed with endo) (complicated by retinopathy, neuropathy, prior left toe amputations), obesity, HLD, biliary colic who presents with sepsis secondary to dental infection, sinusitis and facial cellulitis (right sided).    Diabetes Focus:  Glenny Campos presents with Right facial cellulitis post dental procedure( root canal) this past Tuesday.  She has not been able to eat since Tuesday.  She has had pain and low grade fevers.  She has had some vomiting she thinks from the pain medicine.  The nausea and vomiting were worse overnight. She is urinating frequently she thinks from the IV fluids and reports thirst.  Cannot really tell me if this was the case prior to admission.  She says she always runs higher BG overnight.  Her BG in the am have been 150 and then the rest of the day they are 200's.  She cannot tell me if she was running in basal 1 or basal 2.  She says she removed her pod-- she thinks on Wednesday night and did not replace it because she thought she would need an MRI and she thought she needed to come in.  Her BG were initially in the low 200's upon admission and then this morning up to the 300's.  She received Lantus 10 last night and Lantus 10 again this morning with medium sliding scale.  Her presenting Co2=21 and then Co2= 17 this am.  Her AG was 15 and AG 20 this am.  Her  ketone this am=4.00 and her zkmy=677.   Her VBG has ph=7.37. She has good mentation although teary and ill appearing.       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  With infection of  Third toe, diabetic retinopathy, diabetic neuropathy  Prior to Admission Diabetes Regimen:    Did not tolerate metformin  Gerard 3  Omnipod Dash ( has been wearing this since 10/13/2022)    BG monitor: Gerard 3  Frequency of checks: multiple times daily  Omnipod Dash (the following settings were confirmed from the PDM)  Basal 1                       (these are the settings basal 1 which she was using prior to the admission)  00:00-11:29-->  1.05  11:30 am to 17:59-->  1.0  18:00 to 23:59-->  1.05  Total basal=24.85  ISF=1:45  Insulin to carb ratio: 1:15   Target Glucose=100-110  Total Gluc 4 hours    Inactive Basal 2 (switched from the settings to basal 1 prior to the admission)  00:00-06:59--> 1.5  07:00-16:59--> 1  17:00--23:59-->2  Total basal 34.5      Omnipod Dash setting per Epic in 11/2/22    Basal 00:00-09:59 am-->  1.5  10:00 am to 21:59--> 1.0  22:00 to 23:59--> 1.5    ISF=1:55  1:18 carb ratio  Target=110-120    Diet: 2 meals but has not eaten since Tuesday  Usual BG control PTA:  with A1c 6.8 on 6/22/2023  History of DKA:  Less likely DKA but just ketosis  Able to Detect Hypoglycemia: yes she gets sweaty and fatigue  Usual Diabetes Care Provider: Lori Leone MD   Department of Diabetes and Endocrinology   [facility]     Primary Care Provider: Physician No Ref-Primary  Factors Impacting IP Glucose Control: Cellulitis of the right face, not able to eat well due to swelling, prefers supplements rather then medication  Current Diet: full liquid diet        Physical Exam   BP (!) 150/67 (BP Location: Left arm)   Pulse 71   Temp 97.6  F (36.4  C) (Oral)   Resp 16   Ht  "1.778 m (5' 10\")   Wt 125.3 kg (276 lb 3.2 oz)   SpO2 96%   BMI 39.63 kg/m    General: awake, alert, appears ill,  present at the bedside   HEENT:  Facial swelling much better to resolved except swelling on cheek but less erythematous  Lungs: unlabored respiration, no cough  MSK:  moves all extremities,   Mental status:  alert, oriented to self, place, date  Psych:  Normal mood and affect.    Most Recent Laboratory Tests:  Recent Labs   Lab 06/28/23  0815   HGB 12.5     Recent Labs   Lab 06/22/23  1640   A1C 6.8*     Recent Labs   Lab 06/29/23  0639   CR 0.50*     Recent Labs   Lab 06/29/23  1135 06/29/23  0639 06/29/23  0139 06/28/23 2136 06/28/23  1816 06/28/23  1600   * 114* 93 105* 139* 132*     ROUTINE IP LABS (Last four results)  BMP  Recent Labs   Lab 06/29/23  1135 06/29/23  0639 06/29/23  0139 06/28/23 2136 06/28/23  0853 06/28/23  0815 06/27/23  1247 06/27/23  0942 06/26/23  0741 06/26/23  0704   NA  --  141  --   --   --  142  --  141  --  141   POTASSIUM  --  3.5  --   --   --  3.5  --  3.5  --  3.6   CHLORIDE  --  106  --   --   --  105  --  106  --  105   JOSE CARLOS  --  8.6  --   --   --  8.9  --  9.0  --  8.7   CO2  --  25  --   --   --  23  --  22  --  22   BUN  --  5.0*  --   --   --  4.7*  --  5.4*  --  5.4*   CR  --  0.50*  --   --   --  0.51  --  0.49*  --  0.48*   * 114* 93 105*   < > 93   < > 84   < > 112*    < > = values in this interval not displayed.     CBC  Recent Labs   Lab 06/28/23  0815 06/27/23  0943 06/26/23  0704 06/24/23  0601   WBC 5.2 5.2 5.0 12.2*   RBC 4.08 4.25 3.99 4.15   HGB 12.5 13.0 12.2 12.8   HCT 37.7 39.5 37.0 38.4   MCV 92 93 93 93   MCH 30.6 30.6 30.6 30.8   MCHC 33.2 32.9 33.0 33.3   RDW 12.0 12.2 12.3 12.6    294 231 276     INR  Recent Labs   Lab 06/23/23  0622   INR 1.30*         trend or trigger automated decision support.     C-Peptide, Serum  Order: [id number]   Ref Range & Units 10 mo ago   C-Peptide 0.5 - 3.3 ng/mL 1.2    Comment: " INTERPRETIVE INFORMATION: Serum, C-Peptide     Reference Interval applies to fasting specimens. To convert to   nmol/L, multiply by 0.33   Performed By: [facility]   [address] [place] [address]   : Gurvinder Haro MD, PhD   Resulting Agency  [facility]     Specimen Collected: 08/25/22  8:26 AM    Performed by: [facility] Last Resulted: 08/27/22  7:49 PM   Received From: [employer]  Result Received: 06/22/23  3:12 PM         Contacting the Inpatient Diabetes Team   From 8AM-4PM: page inpatient diabetes provider that is following the patient, or utilize the job code paging system.   From 4PM-8AM: page the job code for endocrine fellow on call.        Please use the following job code to reach the Inpatient Diabetes team. Note that you must use an in house phone and that job codes cannot receive text pages.     Dial [phone] (or star-star-star [phone] on the new [employer] telephones), then [phone] to reach the endocrine-diabetes provider on call.    Review of prior external note(s) from - [facility] or Care Everywhere    minutes spent on the date of the encounter doing chart review, history and exam, documentation and further activities per the note     Over 50% of my time on the unit was spent counseling the patient and/or coordinating care regarding acute hyperglycemia management.  Reviewing notes and speaking with other health care teams.  Spent 35 minutes on Glenny Liu PA-C  Inpatient Diabetes Service  Pager   [phone]  Date of Service 6/29/2023

## 2023-06-29 NOTE — CONSULTS
VASCULAR SURGERY INPATIENT CONSULTATION / Initial In-Patient visit    VASCULAR SURGEON: Dr. Lewis    LOCATION: [facility]    Glenny Campos  Medical Record #:  [id number]  YOB: 1965  Age:  57 year old     Date of Service: 6/22/2023     PRIMARY CARE PROVIDER: No Ref-Primary, Physician    Reason for consultation: Assess for R Foot OM and as per Podiatry some level of amputation and timing is needed    IMPRESSION / RECOMMENDATION:   Glenny Campos is a 57-year-old female with type 2 diabetes on insulin pump, neuropathy and amputations of the left foot hallux and second digit now with osteomyelitis. Her PMH includes chronic wounds and prior cellulitis in LLE, HLD, obesity, and recent dental work leading to facial cellulitis which is thought to be her primary source of infection. Patient has been seen by podiatry on an outpatient basis, who in 2020 amputated her left foot first digit and last month amputated her second digit. During this admission patient was seen by ID who recommended MRI to assess for osteomyelitis and left foot.  This was completed yesterday demonstrating osteomyelitis of the distal phalanx of the third digit, simultaneously podiatry was consulted.  Considering the new findings on the MRI, patient is recommended to have some level of amputation.  Vascular surgery was consulted to recommend type of amputation for optimal healing.  Her last ABIs were done in 2016 which were normal.  She has a palpable DP but weak PT on left.  We recommend completing arterial studies (ordered for you) to assess flow.    Addendum: Patient's ALEX normal, arterial duplex with triphasic flow. Will defer level of amputation to podiatry, patient has adequate flow. Vascular surgery will sign off.     Thank you for involving vascular surgery in the care of Glenny Campos. Should any questions or concerns arise, please don't hesitate to contact us.    Karoline Preston, CNP  Vascular  Surgery  Pager: [phone]  j [email or web address]  Send message or 10 digit call back number Securely via Anonymous You with the Anonymous You Web Console (learn more here)      HPI:  Glenny Campos is a 57 year old female who was seen today in consultation for assessment of level of amputation of her left foot.  She has a history of type 2 diabetes on insulin pump with neuropathy and prior left lower extremity infections. Patient has been seen on and off by podiatry on an outpatient for her left foot chronic wounds. In 2020 patient had her left first digit amputated by Dr. Espinoza for nonhealing wound.  More recently patient noted to have an open wound on the second digit of her left foot.  Subsequently in May of this year she had her second digit of left foot amputated.  Patient notes that she is not sure why she continues to have infections on her left lower extremity. Denies fevers or chills, uses essential oils.  During this admission podiatry was consulted and subsequently requested assessment of flow and level of amputation.    PHH:    Past Medical History:   Diagnosis Date     Type 2 diabetes mellitus with diabetic polyneuropathy (H)        History reviewed. No pertinent surgical history.     ALLERGIES:     Allergies   Allergen Reactions     Cephalosporins      Other reaction(s): *Unknown     Piperacillin-Tazobactam In Dex Hives     Statins Muscle Pain (Myalgia)     Cephalexin Rash     Clindamycin Rash     possible rash, was on zosyn at the same time       Penicillins Hives and Rash        MEDS:    Current Facility-Administered Medications:      acetaminophen (TYLENOL) tablet 975 mg, 975 mg, Oral, Q8H, Laureen Mitchell MD, 975 mg at 06/26/23 0438     chlorhexidine (PERIDEX) 0.12 % solution 15 mL, 15 mL, Swish & Spit, BID, Bee, Sarahi, DDS, 15 mL at 06/29/23 0812     dextrose 10% infusion, , Intravenous, Continuous PRN, Jhoana Estrella,      glucose gel 15-30 g, 15-30 g, Oral, Q15 Min PRN **OR** dextrose 50 %  injection 25-50 mL, 25-50 mL, Intravenous, Q15 Min PRN **OR** glucagon injection 1 mg, 1 mg, Subcutaneous, Q15 Min PRN, Jhoana Estrella DO     famotidine (PEPCID) tablet 20 mg, 20 mg, Oral, BID PRN, Laureen Mitchell MD     HYDROmorphone (PF) (DILAUDID) injection 0.3-0.5 mg, 0.3-0.5 mg, Intravenous, Q3H PRN, Laureen Mitchell MD, 0.5 mg at 06/24/23 1318     insulin aspart (NovoLOG) injection (RAPID ACTING), 1-7 Units, Subcutaneous, TID AC, Estrella Liu PA-C     insulin aspart (NovoLOG) injection (RAPID ACTING), 1-5 Units, Subcutaneous, At Bedtime, Estrella Liu PA-C     insulin aspart (NovoLOG) injection (RAPID ACTING), , Subcutaneous, TID w/meals, Estrella Liu PA-C, 1 Units at 06/26/23 1947     insulin aspart (NovoLOG) injection (RAPID ACTING), , Subcutaneous, With Snacks or Supplements, Estrella Liu PA-C, 2 Units at 06/28/23 1601     insulin glargine (LANTUS PEN) injection 20 Units, 20 Units, Subcutaneous, Q24H, Estrella Liu PA-C, 20 Units at 06/28/23 2242     ketorolac (TORADOL) injection 30 mg, 30 mg, Intravenous, Q6H PRN, Jhoana Estrella DO     [Held by provider] levofloxacin (LEVAQUIN) infusion 750 mg, 750 mg, Intravenous, Q24H, Laureen Mitchell MD, Last Rate: 100 mL/hr at 06/26/23 2017, 750 mg at 06/26/23 2017     levofloxacin (LEVAQUIN) tablet 750 mg, 750 mg, Oral, Daily, Rush Crowell MD, 750 mg at 06/28/23 1927     lidocaine (LMX4) cream, , Topical, Q1H PRN, Laureen Mitchell MD     lidocaine (PF) (XYLOCAINE) 1 % injection 10 mL, 10 mL, Infiltration, Once, Sarahi Gamboa, LATA     lidocaine 1 % 0.1-1 mL, 0.1-1 mL, Other, Q1H PRN, Laureen Mitchell MD     melatonin tablet 1 mg, 1 mg, Oral, At Bedtime PRN, Laureen Mitchell MD     metoclopramide (REGLAN) injection 10 mg, 10 mg, Intravenous, Q6H PRN, Jhoana Estrella DO     [Held by provider] metroNIDAZOLE (FLAGYL) infusion 500 mg, 500 mg, Intravenous, Q12H, Laureen Mitchell MD, Last Rate: 0 mL/hr at 06/23/23 2158, 500 mg at 06/26/23 1821     metroNIDAZOLE  (FLAGYL) tablet 500 mg, 500 mg, Oral, BID, Rush Crowell MD, 500 mg at 06/29/23 0811     naloxone (NARCAN) injection 0.2 mg, 0.2 mg, Intravenous, Q2 Min PRN **OR** naloxone (NARCAN) injection 0.4 mg, 0.4 mg, Intravenous, Q2 Min PRN **OR** naloxone (NARCAN) injection 0.2 mg, 0.2 mg, Intramuscular, Q2 Min PRN **OR** naloxone (NARCAN) injection 0.4 mg, 0.4 mg, Intramuscular, Q2 Min PRN, Jhoana Estrella DO     ondansetron (ZOFRAN ODT) ODT tab 4 mg, 4 mg, Oral, Q6H PRN **OR** ondansetron (ZOFRAN) injection 4 mg, 4 mg, Intravenous, Q6H PRN, Laureen Mitchell MD, 4 mg at 06/23/23 0058     oxyCODONE (ROXICODONE) tablet 5 mg, 5 mg, Oral, Q4H PRN, Jhoana Estrella DO     pantoprazole (PROTONIX) IV push injection 40 mg, 40 mg, Intravenous, Daily with breakfast, Jhoana Estrella DO, 40 mg at 06/26/23 0907     Patient is already receiving mechanical prophylaxis, , Does not apply, Continuous PRN, Jhoana Estrella DO     prochlorperazine (COMPAZINE) injection 10 mg, 10 mg, Intravenous, Q6H PRN **OR** prochlorperazine (COMPAZINE) tablet 10 mg, 10 mg, Oral, Q6H PRN **OR** prochlorperazine (COMPAZINE) suppository 25 mg, 25 mg, Rectal, Q12H PRN, Laureen Mitchell MD     sodium chloride (PF) 0.9% PF flush 3 mL, 3 mL, Intracatheter, Q8H, Laureen Mitchell MD, 3 mL at 06/28/23 1305     sodium chloride (PF) 0.9% PF flush 3 mL, 3 mL, Intracatheter, q1 min prn, Laureen Mitchell MD, 3 mL at 06/28/23 2242     vancomycin (VANCOCIN) 2,000 mg in sodium chloride 0.9 % 500 mL intermittent infusion, 2,000 mg, Intravenous, Q12H, Jhoana Estrella DO, 2,000 mg at 06/29/23 0811     SOCIAL HABITS:    Tobacco Use      Smoking status: None      Smokeless tobacco: None     Social History    Substance and Sexual Activity      Alcohol use: Not on file       History   Drug Use Not on file        FAMILY HISTORY:  History reviewed. No pertinent family history.    REVIEW OF SYSTEMS:    A 12 point ROS was reviewed and except for what is listed in the HPI above, all others are  "negative    PE:    Vital signs:  Temp: 98.4  F (36.9  C) Temp src: Oral BP: 139/52 Pulse: 64   Resp: 16 SpO2: 94 % O2 Device: None (Room air)   Height: 177.8 cm (5' 10\") Weight: 124.7 kg (274 lb 14.4 oz)  Estimated body mass index is 39.44 kg/m  as calculated from the following:    Height as of this encounter: 1.778 m (5' 10\").    Weight as of this encounter: 124.7 kg (274 lb 14.4 oz).       Wt Readings from Last 1 Encounters:   06/28/23 124.7 kg (274 lb 14.4 oz)     Body mass index is 39.44 kg/m .    EXAM:  GENERAL: This is a well-developed 57 year old female who appears her stated age  CARDIAC:  Not assessed  CHEST/LUNG:  Clear lung fields bilaterally  GASTROINTESINAL (ABDOMEN):Soft, non-tender, B/S present, no pulsatile mass   MUSCULOSKELETAL: Grossly normal and both lower extremities are intact.  HEME/LYMPH: No lymphedema  NEUROLOGIC: Focally intact, Alert and oriented x 3.   PSYCH: appropriate affect  INTEGUMENT: Left foot second and third digits with open wounds.  Skin discoloration noted on left lower extremity, per patient this is from her prior event with cellulitis.  VASCULAR: Left foot DP palpable, weak Doppler signal over PT.  +2 femoral pulses.  Palpable DP pulse on right.  Warm, appears well-perfused.  Sensory function intact                   DIAGNOSTIC STUDIES:     Images:  MR Foot Left w/o & w Contrast    Result Date: 6/28/2023  MR left foot without and with contrast 6/28/2023 11:41 AM History: evaluate for osteomyelitis Techniques: Multiplanar multisequence imaging of the left foot was obtained before and after administration of intravenous contrast. Contrast: 10cc of Gadavist injected. Comparison: 6/22/2023 Findings: Bones Postsurgical changes of the great toe amputation at the level of first metatarsophalangeal joint. Mild edema like marrow signal intensity and associated T1 hypointensity at the first metatarsal head, favors postoperative change though possible osteomyelitis cannot be entirely " excluded. Overlying mild regional soft tissue enhancement. Postsurgical change of second toe amputation at the level of the proximal phalanx distally with underlying marrow signal abnormality including T1 hypointensity, T2 hyperintensity and enhancement extending down to the proximal shaft, consistent with osteomyelitis until proven otherwise. Regional soft tissue enhancement, consistent with cellulitis. Extensive marrow signal alteration including T1 hypointensity and enhancement involving entire third distal and middle phalanges, consistent with osteomyelitis until proven otherwise. Regional soft tissue enhancement, consistent with cellulitis. Apparent dorsal soft tissue defect at the base of nail bed. Marked T1 hypointensity with associated enhancement and mild edema like marrow signal intensity of the fifth distal phalanx, highly concerning for osteomyelitis. Regional soft tissue enhancement without loculated fluid collection. Degenerative changes of mid foot, likely related to Charcot arthropathy. Joints and periarticular soft tissue Joint effusion: Mild second metatarsophalangeal joint synovitis. Plantar plates: Plantar plates of the second through fifth toe at metatarsophalangeal joints are grossly intact. Intermetatarsal spaces: No interdigital neuroma. Mild second and possibly third intermetatarsal bursitis. Ligaments and Tendons Lisfranc interosseous ligament: Intact. Tendons: Other than changes related to amputations, the visualized courses of flexor and extensor tendons are intact. Small postoperative fluid proximal to the mastoid mammogram every, incompletely visualized. Muscles Diffuse severe fatty infiltration/atrophy of the visualized muscles, consistent with microangiopathy/polyneuropathy. ANCILLARY FINDINGS No rim-enhancing fluid collection to indicate abscess. Dorsal subcutaneous edema and enhancement particularly over the second through fourth metatarsals, query cellulitis. Plantar subcutaneous  fat effacement partially visualized at the level of the midfoot. Mild nodular thickening central cord of plantar aponeurosis approximately at the level of the first tarsometatarsal joint, query small plantar fibroma.     Impression: 1. Findings consistent with osteomyelitis of the second proximal phalanx, third distal and middle phalanges until proven otherwise. 2. Findings highly concerning for osteomyelitis fifth distal phalanx. 3. Though thought less likely, possible residual osteomyelitis first metatarsal head cannot be entirely excluded. 4. No abscess. 5. Plantar subcutaneous fat effacement partially visualized at the level of the midfoot, maybe pressure callus. aScentias   SYSTEM ID:  [id number]    Foot XR, G/E 3 views, left    Result Date: 6/26/2023  EXAM: XR FOOT LEFT G/E 3 VIEWS LOCATION: [facility] DATE: 6/22/2023 INDICATION: Second and third toe redness, evaluate for osteomyelitis. COMPARISON: None.     IMPRESSION: Postop previous amputation of the great toe and partial amputation at the second toe through the PIP joint of the second toe. Bony irregularity at the remaining first metatarsal head could represent osteomyelitis particularly if there is a deep ulcer which probes to bone. No convincing radiographic evidence for acute osteomyelitis at the distal margin of the proximal phalanx second toe. Bony irregularity centered at the DIP joint of the third toe with slight widening of the joint space can  be seen with septic arthritis and osteomyelitis at the third toe DIP joint with adjacent soft tissue swelling. Correlate clinically. Scattered arthritic change left foot. No acute fracture or dislocation. Heel spur. Dorsal midfoot spurring with spurring  at the dorsum of the talar head. NOTE: ABNORMAL REPORT THE DICTATION ABOVE DESCRIBES AN ABNORMALITY FOR WHICH FOLLOW-UP IS NEEDED.     CT Facial Bones with Contrast    Result Date: 6/22/2023  CT FACIAL  BONES WITH CONTRAST 6/22/2023 6:48 PM History:  root canal 6/20.  Facial swelling Comparison:  None  Technique: Using thin collimation multidetector helical acquisition technique, axial and coronal thin section CT images were reconstructed through the facial bones. Images were reviewed in bone and soft tissue windows. Findings:  Reported dental procedure involving the third maxillary molar. The third molar and lateral margins of the maxillary sinus without evidence of dentigerous cyst, however, there is at least moderate right maxillary sinus mucosal debris/thickening, with a milder degree within the left maxillary sinus and scattered paranasal sinus mucosal thickening. There is substantial right subcutaneous facial soft tissue inflammatory change. Questionable small fluid collection along the right hemimandible, image 21 of series 2. There is trace air foci near the postsurgical region (series 2 image 19-20), also with somewhat streaky hypodensities within the inflammatory change without discrete fluid collection. There is somewhat limited evaluation secondary to dental hardware streak artifact. Bilateral nonenlarged reactive parotid, some and fibular, and submental lymph nodes, as well as bilateral upper jugular lymph node seen. No evident fracture of the facial bones. The cribriform plate appears intact. Alignment of the facial bones appears normal.  The orbits are normal. Mastoid air cells are unremarkable. Visualized portions of the brain parenchyma demonstrate no definite abnormality.     Impression: 1. Extensive inflammatory change about the right maxillary and mandibular subcutaneous soft tissues. Questionable small fluid collection along the right hemimandible. 2. Reactive bilateral lymph nodes, noted above. 3. Right maxillary sinusitis without definite dentigerous cyst or odontogenic origin. I have personally reviewed the examination and initial interpretation and I agree with the findings. JUDY TAN,  MD   SYSTEM ID:  [id number]    XR Foot Lt AP/Lat/MO/LO 4Vws    Result Date: 6/8/2023  EXAM: XR FOOT LT AP/LAT/MO/LO 4VWS LOCATION: [facility] DATE/TIME: 6/7/2023 8:26 AM CDT INDICATION: Partial second toe amputation; new ulcer third digit COMPARISON: 05/22/2023. IMPRESSION: No erosive change/osteolysis identified in the third toe. Prior amputation through the epiphysis of the proximal phalanx of the second toe. Minimal irregularity along the amputation margin, without definite erosive change/osteolysis. Additional prior amputation of the first toe through the head of the metatarsal, without erosive/destructive change. Moderate degenerative joint space narrowing and spurring in the IP joints of the lesser toes. Normal second through fifth MTP joints. Mild-moderate degenerative joint space narrowing and spurring in the TMT, naviculocuneiform, calcaneocuboid, and talonavicular joints.      LABS:      Sodium   Date Value Ref Range Status   06/29/2023 141 136 - 145 mmol/L Final   06/28/2023 142 136 - 145 mmol/L Final   06/27/2023 141 136 - 145 mmol/L Final     Urea Nitrogen   Date Value Ref Range Status   06/29/2023 5.0 (L) 6.0 - 20.0 mg/dL Final   06/28/2023 4.7 (L) 6.0 - 20.0 mg/dL Final   06/27/2023 5.4 (L) 6.0 - 20.0 mg/dL Final     Hemoglobin   Date Value Ref Range Status   06/28/2023 12.5 11.7 - 15.7 g/dL Final   06/27/2023 13.0 11.7 - 15.7 g/dL Final   06/26/2023 12.2 11.7 - 15.7 g/dL Final     Platelet Count   Date Value Ref Range Status   06/28/2023 261 150 - 450 10e3/uL Final   06/27/2023 294 150 - 450 10e3/uL Final   06/26/2023 231 150 - 450 10e3/uL Final     INR   Date Value Ref Range Status   06/23/2023 1.30 (H) 0.85 - 1.15 Final

## 2023-06-29 NOTE — PLAN OF CARE
Time 6542-9133    Vitals: VSS on RA  Activity: Up ad isac  Pain: C/O R facial pain - declines medication  Neuro: A&OX4    Cardiac:  WDL  Respiratory:  Denies cough/SOB  GI/: Voids spontaneously, LBM 6/27  Diet: Mechanical/Dental Soft - poor appetite  Lines: L PIV SL  Skin/Wounds: R facial swelling, L toe dressing CDI  Labs/imaging: Reviewed        New changes this shift:  Stable throughout shift. Requested update from team re: plan and MRI results.     Plan: Awaiting MRI results. Continue IV antibiotics.      Continue to monitor and follow POC     Goal Outcome Evaluation:      Plan of Care Reviewed With: patient, spouse    Overall Patient Progress: improvingOverall Patient Progress: improving    Outcome Evaluation: Continue IV antibiotics

## 2023-06-29 NOTE — PROGRESS NOTES
Assessment: Glenny is a 56 YO type II diabetic on insulin pump with neuropathy and left foot hallux complete and second digit amputations.  MRI was performed yesterday and it does show osteomyelitis of the distal phalanx of the third digit.  After debridement of the eschar on the wound, navdeep bone was exposed.  I discussed the MRI and the clinical findings with her.  She will likely need an amputation of at least the distal aspect of the third digit.  We did also talk about a possible TMA as a course of treatment, as the loss of multiple digits becomes more difficult to functionally weight-bear.  She discussed with me that next Friday she is going on a trip to [place] that she is quite adamant about and is unwilling to miss.  I discussed with her that because she has navdeep bone exposed, this trip should be canceled.  Unsure of whether she would go on this trip or not.  For the toe, this can likely be managed as an outpatient in the short-term with Dr. Espinoza as the surgeon to perform either the amputation of the digit, whether partial or complete, or the TMA.  For now, the toe does not appear to be acutely infected.  I agree with the vascular consult as he will likely need their input to see what kind of amputation is going to heal the best for her.    Plan:  -Patient seen and evaluated.    - MRI was reviewed with her.   -The wound eschar was debrided down to bone tissue with tissue nippers.  -Start Betadine dressings to the toe daily.  Do not keep this open to air.  I did discuss this with her.  -Appreciate IDs recommendations.   - Awaiting vascular.  - Surgical planning will likely take place as outpatient. This will be dependent on whether she goes to [place]. I did discuss with her that I strongly warn against travel in current condition.  - Will follow peripherally.     Interval hx: She had the MRI performed. No pain to the foot. No ON events.     MRI Impression:  1. Findings consistent with osteomyelitis of the  "second proximal  phalanx, third distal and middle phalanges until proven otherwise.  2. Findings highly concerning for osteomyelitis fifth distal phalanx.  3. Though thought less likely, possible residual osteomyelitis first  metatarsal head cannot be entirely excluded.  4. No abscess.  5. Plantar subcutaneous fat effacement partially visualized at the  level of the midfoot, maybe pressure callus.     IVÁN MELVIN          Objective:  Vital signs:  Temp: 98.4  F (36.9  C) Temp src: Oral BP: 139/52 Pulse: 64   Resp: 16 SpO2: 94 % O2 Device: None (Room air)   Height: 177.8 cm (5' 10\") Weight: 124.7 kg (274 lb 14.4 oz)  Estimated body mass index is 39.44 kg/m  as calculated from the following:    Height as of this encounter: 1.778 m (5' 10\").    Weight as of this encounter: 124.7 kg (274 lb 14.4 oz).        Lab Results   Component Value Date    WBC 5.2 06/28/2023     Lab Results   Component Value Date    RBC 4.08 06/28/2023     Lab Results   Component Value Date    HGB 12.5 06/28/2023     Lab Results   Component Value Date    HCT 37.7 06/28/2023     No components found for: MCT  Lab Results   Component Value Date    MCV 92 06/28/2023     Lab Results   Component Value Date    MCH 30.6 06/28/2023     Lab Results   Component Value Date    MCHC 33.2 06/28/2023     Lab Results   Component Value Date    RDW 12.0 06/28/2023     Lab Results   Component Value Date     06/28/2023     Last Comprehensive Metabolic Panel:  Lab Results   Component Value Date     06/29/2023    POTASSIUM 3.5 06/29/2023    CHLORIDE 106 06/29/2023    CO2 25 06/29/2023    ANIONGAP 10 06/29/2023     (H) 06/29/2023    BUN 5.0 (L) 06/29/2023    CR 0.50 (L) 06/29/2023    GFRESTIMATED >90 06/29/2023    JOSE CARLOS 8.6 06/29/2023     Hemoglobin A1C   Date Value Ref Range Status   06/22/2023 6.8 (H) <5.7 % Final     Comment:     Normal <5.7%   Prediabetes 5.7-6.4%    Diabetes 6.5% or higher     Note: Adopted from ADA consensus guidelines. "         PE:  DP and PT pulses non-palpable. CRT 1 second. Diminished pedal hair.  Gross sensation is severely diminished.   Amp of the left hallux and partial 2nd digit.       A wound is noted at left  distal 3rd digit measuring 1cm x 1cm x 0.3cm.    Ross Classification: 3    Wound base: Red  Chakraborty/Granulation  Bone after debridement.     Edges: eschar.    Drainage: small/serous    Odor: no    Undermining: no    Bone Exposure: Yes: distal phalanx    Clinical Signs of Infection: Yes: + probe to bone    After obtaining patient consent, the wound was irrigated with copious amounts of saline. A tissue nipper was then used to debride the wound into bone tissue. The wound edges were debrided back to healthy, bleeding tissue. The wound base exhibited healthy bleeding. Given the patient's lack of sensation, no anesthesia was necessary for the procedure.    Barriers to Healing: previous amputations. Diabetes.     Treatment Plan: Betadine dressings to toe daily    Pt Ability to Follow Plan: Unknown.

## 2023-06-29 NOTE — PLAN OF CARE
Goal Outcome Evaluation:      Plan of Care Reviewed With: patient    Overall Patient Progress: improving    Outcome Evaluation: Pt is alert and orientedx4. Afebrile and VSS except HTN on room air. Complains of 5/10 pain on right side of face/cheek but is refusing scheduled tylenol. PIV saline locked after IV vanco dose. PO abx continue. Up independently to bathroom. BG 93 overnight, drank a cranberry juice to correct this level. Will recheck BG as scheduled in AM. Pt asked for gauze to be removed on left foot between toes and they are now open to air. Fair appetite. Able to make needs known. Continue with POC.

## 2023-06-29 NOTE — PROGRESS NOTES
Care Management Follow Up    Length of Stay (days): 7    Expected Discharge Date: 06/30/2023     Concerns to be Addressed:   Discharge planning    Patient plan of care discussed at interdisciplinary rounds: Yes    Anticipated Discharge Disposition:  Home?      Anticipated Discharge Services:    Anticipated Discharge DME:      Patient/family educated on Medicare website which has current facility and service quality ratings:    Education Provided on the Discharge Plan:    Patient/Family in Agreement with the Plan:      Referrals Placed by CM/SW:    Private pay costs discussed: TBD    Additional Information:  Informed by Dr Crowell pt was seen by Podiatry and Vascular Surgery consult is recommended. Infectious Disease consulting. On IV Vanco. Pt may need long-term IV antibiotics, to be determined.   Attempted to see pt x2 today for Care Management Assessment; the first time she was going to the bathroom & the next time she was being transported off the floor.     [facility]erral sent to  [facility] to check insurance coverage for IV antibiotics.    RN[facility] will continue to follow for plan of care and discharge planning.         Elisha Larsen RN Care Coordinator  [facility]  On 6- RNCC coverage for [address]. Call [phone] or Page: [phone].       [facility] (referral sent for IV antibiotics)  Phone: [phone]

## 2023-06-29 NOTE — PROGRESS NOTES
[facility]    Medicine Progress Note - Hospitalist Service, GOLD TEAM 10    Date of Admission:  6/22/2023    Assessment & Plan   56 yo female with history of DM2 on insulin pump complicated by retinopathy, peripheral neuropathy, and prior left toe amputations and chronic left foot wound, obesity, HLD admitted for sepsis 2/2 dental infection and R facial cellulitis. Hospital course complicated by DKA 2/2 infection. Concern for L foot osteomyelitis.     Today:  -ID consulted: MRI completed, Vascular surgery consulted, ID recs pending   -Continue current antibiotic regimen    -MRI left foot: + OM    Per Podiatry: Start Betadine dressings to the toe daily.  Do not keep this open to air.  I did discuss this with her  -Per OMFS: follow up for drain removal at [facility] clinic per note from 06/24/2023 incision and drainage procedure.  -Continue PO LQ and Flagyl, continue IV Vanco per ID recs  -Repeat BC Daily  -6/29: Consult Vascular surgery to support planning and timing for amputation   - Await ID abx recs    Sepsis 2/2 R Canine Space Abscess, Associated Facial Cellulitis and Sinusitis   S/p I&D of a right canine space abscess with placement of a penrose drain (x1) 6/24   Had root canal of R maxillary 1st molar on 6/20 as outpatient which led to above complication. Presented to ED with worsening facial pain and swelling, had leukocytosis and tachycardia at admission.   -Antibiotics (has multiple drug allergies)    -Levofloxacin 6/22 -    -Metronidazole 6/22 -    -Vancomycin 6/24 -   -OMFS consulted, appreciate assistance    -s/p I&D of a right canine space abscess with placement of a penrose drain (x1).   -Warm compress, avoid ice, HOB >30   -Dental team consulted, appreciate assistance    -Recommend 0.12% chlorhexidine glutamate mouthrinse BID  -Pain control: Scheduled acetaminophen, PRN oral oxycodone, PRN IV dilaudid, PRN toradol    -ID consulted, appreciate assistance      Staph Epi + Blood cultures 3/4   Blood cultures drawn 6/23 as a part of sepsis/DKA work-up. 6/24 returned positive for staph Epi (methicillin sensitive). Unclear if represents true bacteremia vs contamination.   -Continue vancomycin 6/24 -   -Follow up repeat blood cultures 6/24 - NGTD   -ID consulted, appreciate assistance    -Daily blood cultures till clearance     Left Third Toe Wound   Concern for Osteomyelitis   With h/o osteomyelitis of prior amputated toes. X-ray without obvious signs of osteo.   -Podiatry/Orthopedics consulted, appreciate assistance    6/29 updated recs noted  -WOCN Consulted    -Wound cares per their orders  -MRI Left forefoot + OM  -Vascular Surgery consulted    Insulin Dependent DM2  Likely DKA 2/2 infection as above, improving DKA   Typically has a continuous glucose monitor and an insulin pump and per notes and per patient's pump she has about 1 unit of humalog per hour. Has had her pump off for about 12 hours prior to admission.   On 6/23 AM, labs showed DKA although pH on VBG was 7.37 (?). AGMA and glucose improved with insulin drip. Serum ketones still elevated, suspect 2/2 alternate cause (starvation ketosis?).   -Endocrine consulted, appreciate assistance   -6/23 - started insulin drip with DKA protocol in AM, with closing gap transitioned to non-DKA insulin drip in evening. Lantus given per endocrine recs.   -6/24 - continued on insulin drip + lantus per endocrine recs/assistance  -6/25 - discontinued insulin drip    - Currently: Lantus 20 once daily and ISS       Diet: Mechanical/Dental Soft Diet    DVT Prophylaxis: Pneumatic Compression Devices and Ambulate every shift  Brenner Catheter: Not present  Lines: None     Cardiac Monitoring: None  Code Status: Full Code      Clinically Significant Risk Factors              # Hypoalbuminemia: Lowest albumin = 3.2 g/dL at 6/25/2023  7:56 AM, will monitor as appropriate           # DMII: A1C = 6.8 % (Ref range: <5.7 %) within past 6  "months   # Obesity: Estimated body mass index is 39.44 kg/m  as calculated from the following:    Height as of this encounter: 1.778 m (5' 10\").    Weight as of this encounter: 124.7 kg (274 lb 14.4 oz).           Disposition Plan     Expected Discharge Date: 06/30/2023        Discharge Comments: Pending antibiotics plan, may need to discharge home with Iv antibiotics pending ID Recs          Rush Crowell MD  Hospitalist Service, GOLD TEAM 10  [facility]  Securely message with [employer] (more info)  Text page via [email or web address] Paging/Directory   See signed in provider for up to date coverage information  ______________________________________________________________________    Interval History   No overnight events reported.    + MRI discussed.  Pt updated and expressed her concerns    Physical Exam   Vital Signs: Temp: 98.4  F (36.9  C) Temp src: Oral BP: 139/52 Pulse: 64   Resp: 16 SpO2: 94 % O2 Device: None (Room air)    Weight: 274 lbs 14.4 oz    Constitutional: no apparent distress, pleasant and cooperative   Cardiovascular: regular rate and rhythm, normal S1 and S2  Respiratory: clear to auscultation bilaterally, no wheezing, rales, or rhonchi, normal work of breathing   GI: abdomen soft, non tender, non distended, bowel sounds present   Neuro: alert and oriented, no gross focal deficits noted   Skin: Right cheek with are that is firm and erythematous, overall improved erythema and swelling from prior     Medical Decision Making       52 MINUTES SPENT BY ME on the date of service doing chart review, history, exam, documentation & further activities per the note.  MANAGEMENT DISCUSSED with the following over the past 24 hours: antibitotic plan, contacting OMFS if they don't stop by, updating the RN, awaiting MRI.        Data     I have personally reviewed the following data over the past 24 hrs:    N/A  \   N/A   / N/A     141 106 5.0 (L) /  114 (H)   3.5 25 0.50 (L) \     "   Imaging results reviewed over the past 24 hrs:   Recent Results (from the past 24 hour(s))   MR Foot Left w/o & w Contrast    Narrative    MR left foot without and with contrast 6/28/2023 11:41 AM    History: evaluate for osteomyelitis    Techniques: Multiplanar multisequence imaging of the left foot was  obtained before and after administration of intravenous contrast.    Contrast: 10cc of Gadavist injected.    Comparison: 6/22/2023    Findings:    Bones    Postsurgical changes of the great toe amputation at the level of first  metatarsophalangeal joint. Mild edema like marrow signal intensity and  associated T1 hypointensity at the first metatarsal head, favors  postoperative change though possible osteomyelitis cannot be entirely  excluded. Overlying mild regional soft tissue enhancement.     Postsurgical change of second toe amputation at the level of the  proximal phalanx distally with underlying marrow signal abnormality  including T1 hypointensity, T2 hyperintensity and enhancement  extending down to the proximal shaft, consistent with osteomyelitis  until proven otherwise. Regional soft tissue enhancement, consistent  with cellulitis.     Extensive marrow signal alteration including T1 hypointensity and  enhancement involving entire third distal and middle phalanges,  consistent with osteomyelitis until proven otherwise. Regional soft  tissue enhancement, consistent with cellulitis. Apparent dorsal soft  tissue defect at the base of nail bed.    Marked T1 hypointensity with associated enhancement and mild edema  like marrow signal intensity of the fifth distal phalanx, highly  concerning for osteomyelitis. Regional soft tissue enhancement without  loculated fluid collection.    Degenerative changes of mid foot, likely related to Charcot  arthropathy.    Joints and periarticular soft tissue    Joint effusion: Mild second metatarsophalangeal joint synovitis.    Plantar plates: Plantar plates of the second  through fifth toe at  metatarsophalangeal joints are grossly intact.    Intermetatarsal spaces: No interdigital neuroma. Mild second and  possibly third intermetatarsal bursitis.    Ligaments and Tendons    Lisfranc interosseous ligament: Intact.    Tendons: Other than changes related to amputations, the visualized  courses of flexor and extensor tendons are intact. Small postoperative  fluid proximal to the mastoid mammogram every, incompletely  visualized.    Muscles    Diffuse severe fatty infiltration/atrophy of the visualized muscles,  consistent with microangiopathy/polyneuropathy.    ANCILLARY FINDINGS    No rim-enhancing fluid collection to indicate abscess.    Dorsal subcutaneous edema and enhancement particularly over the second  through fourth metatarsals, query cellulitis.    Plantar subcutaneous fat effacement partially visualized at the level  of the midfoot. Mild nodular thickening central cord of plantar  aponeurosis approximately at the level of the first tarsometatarsal  joint, query small plantar fibroma.      Impression    Impression:  1. Findings consistent with osteomyelitis of the second proximal  phalanx, third distal and middle phalanges until proven otherwise.  2. Findings highly concerning for osteomyelitis fifth distal phalanx.  3. Though thought less likely, possible residual osteomyelitis first  metatarsal head cannot be entirely excluded.  4. No abscess.  5. Plantar subcutaneous fat effacement partially visualized at the  level of the midfoot, maybe pressure callus.    Orate         SYSTEM ID:  [id number]

## 2023-06-29 NOTE — PLAN OF CARE
Goal Outcome Evaluation:      Plan of Care Reviewed With: patient    Overall Patient Progress: improvingOverall Patient Progress: improving    Outcome Evaluation: pt A&Ox4, VSS on RA, afebrile.  Reports pain 4/10 in R face cellulitis, declined tylenol.  BG stable this shift, see MAR for insulin administration.  Continue IV vancomycin and PO abx.  Podietry assessed L toe wounds, wound orders in place.

## 2023-06-29 NOTE — PROGRESS NOTES
[facility] DISEAS[facility]: FOLLOW UP NOTE     Patient:  Glenny Campos   Date of birth 1965, Medical record number [id number]  Date of Visit:  06/29/2023  Date of Admission: 6/22/2023  Consult Requester:Jhoana Estrella DO          Assessment and Recommendations:     ID Problem List:   1. Right sided dental infection following dental procedure complicated by facial cellulitis with hemimandible abscess (s/p bedside I&D 6/24) and maxillary sinusitis  - Cultures polymicrobial growth (Str anginosusx2, Str mitis, Str intermedius, Actinomyces odontolyticus)  2. Chronic wound of left foot - 2nd and 3rd digits  - MRI foot 6/28/2023  3. Positive blood culture - Staph epidermidis, +6/23 in 2/2 sets with repeat NGTD - unclear if true infection vs pathogen  4. History of osteomyelitis of left great toe s/p amputation 2020 through metatarsal head, and distal 2nd toe s/p amputation 05/2023  5. T2DM with polyneuropathy (A1C 6.8)  6. Multiple antibiotics allergies - penicillin, cephalosporins    RECOMMENDATION:   1. Continue current regimen - IV vancomycin, PO levofloxacin, and PO flagyl for now  2. Requested additional susceptibilities of Vancomycin, Levofloxacin against isolates from I&D  3. Follow up pending cultures - blood / oral abscess  4. Appreciate vascular surgery inputs, will follow up further recommendations as may impact start date and duration of antibiotics  5. Appreciate clarification from OMFS/dental team regarding OSH dental procedure - what was done, ?if any exposed bone (pt/family mentions possible use of cadaver bone, but unsure if they meant current or future use?) or if further imaging recommended by dental/OMFS to evaluate any bone involvement.  6. Given the growth of Actinomyces species from I&D, which usually requires longer duration of antibiotics (6-12 months, a few weeks to months of iv and then po).   7. Advise against travel while on iv antibiotics as requires close monitoring of  vancomycin level and side effects like nephrotoxicity, intermittent QTc monitoring with levofloxacin  8. Consider outpatient allergy assessment regarding allergy to multiple antibiotics    ASSESSMENT:  Glenny Campos is a 57 year old female with past medical history significant for Type 2 DM with retinopathy, peripheral neuropathy, previous left great toe amputation (2020, distal 2nd toe 2023) due to osteomyelitis with  chronic left foot wound who was admitted 6/22 for right sided dental infection following recent dental procedure (6/20) complicated by facial cellulitis and abscess s/p bedside I&D (culture pending). Hospital course complicated by DKA and positive blood cultures for Staph epidermidis (started on IV vancomycin). Concern also for nonhealing chronic left foot wound for likely osteomyelitis. She has multiple reported antibiotic allergies/intolerances which make choosing a regimen difficult, in addition to the fact that patient refused to take oral antibiotics following her dental procedure and likely also after her recent May toe amputation (outside podiatry concerned for poor healing 6/7). She uses essential oils instead of antibiotics - both topically to foot and ingesting.     Recommend continue current antimicrobial regimen - IV vancomycin, PO levofloxacin (oral lena, gram negatives) and PO flagyl (anaerobes, oral coverage) to cover dental infection, possible blood infection, and possible left foot osteomyelitis at this time. It is unclear if blood cultures from 6/23 represent true infection (possible from oral source vs foot) vs contaminant as patient remains afebrile, blood cultures collected only due to DKA workup, and repeat cultures collected at start of IV vancomycin are NGTD. MRI foot suggestive of osteomyelitis involving multiple areas. Being evaluated by vascular surgery and additional work up.     Would appreciate if OMFS/dental team could clarify with her outside dental provider if any  cadaver bone was used (patient/family report this was mentioned, though not sure if it was truly used or if discussing future needs?), if any current concern for exposed bone around dental infection, or if any further imaging from dental perspective. CT facial bones without obvious bone concern, more soft tissue inflammation and abscess.     Overall patient would need at least 6 weeks course of antibiotics for osteomyelitis, start date depends on if any plans for surgical intervention and source control. Regarding facial abscess s/p ID, whereby cultures with polymicrobial growth including Actinomyces species which requires longer course of antibiotics (6-12 months, initially iv and then transition to po). Usually Streptococcus species and Actinomyces spp are susceptible to penicillin, cephalosporin group of antibiotics. However, patient has documented allergy to these antibiotics. That said, requested microbiology lab to test susceptibilities of all isolates against vancomycin, levofloxacin to ensure appropriate regimen.     Of note, patient and her  at bedside, mentioned about upcoming travel to [place] and inquiring if can deliver iv antibiotics there. Writer explained about importance of close monitoring while on iv vancomycin (trough level and also for nephrotoxicity), while likely requires intermittent QTc monitoring being on levofloxacin along with clinic follow up. Advise against travel while on iv antibiotics. Writer was asked if can transition to oral antibiotics regimen, to allow this upcoming travel. Explained that even if can come with potential regimen of oral antibiotics (to treat foot osteomyelitis and facial/manibular abscess), might be suboptimal and puts patient as risk of progression of infection and/or delay healing.     Recommendations discussed with the primary team.    ID team will continue to follow. Please reach out if any questions or concerns.    Total time spent during this encounter  (chart review, documentation, MDM, coordination of care): 65 minutes    Christiana John MD  Infectious Disease Staff Physician  Pager: [phone]  [employer] [facility]   Date: 6/29/2023           Interval history:     Patient is seen and examined at bedside today. Notes improvement in facial swelling and redness.          History of Present Illness, as adopted from initial ID consult 5/25/2023:     Glenny Campos is a 57 year old female with past medical history significant for Type 2 DM with retinopathy, peripheral neuropathy, previous left great toe amputation (2020, 2023) and chronic left foot wound who was admitted 6/22 for right facial cellulitis and dental infection, course complicated by DKA.     She presented to ED 6/22 with increased pain, swelling of right face following dental procedure for right 3rd tooth two days prior to admission with tooth pain beginning 6/18. She did not take post-procedure antibiotics as recommended to her and took essential oils instead. She denied fevers, abdominal pain. Had upset stomach after taking pain medication. She endorses nausea, vomiting, and possible chills, denied rigors. Denied drainage in mouth or foul taste.  at bedside and friend Amaris (homeopathic work colleague) on phone provide additional history. Overall, Glenny is a somewhat difficult historian with limited insight on my interview. Amaris more concerned for foot infection as possible source than dental, and wants foot addressed as well.     In ED, labs notable for WBC 12.2, lactic 1.4, , ESR 40. Has remained afebrile, vitals stable. Facial CT with extensive inflammatory changes around R maxillary and mandibular soft tissue with possible small fluid collection. Dental and OMFS consulted - advised I&D, however initially patient refused I&D under local anesthesia or nitrous. She reports multiple antibiotic allergies - penicillins (hives, rash), clindamycin and Zosyn at same time had rash, cephalexin  "(rash), and cephalosporins (unknown). She is unable to describe reactions or rash in detail to me. Mentioned one rash that she could recall was \"red and on her bottom only\", other rashes \"other places\". She denies any history of anaphylaxis. Due to multiple reported allergies, was started on levo + flagyl. Ultimately had I&D on 6/24 by OMFS at bedside, drainage of gross purulence noted and s/p penrose drain placement. Culture from abscess pending.     She has current chronic ulceration of left foot with edema and eschar on 3rd toe. Has previous amputation of left great toe in 2020 due to OM, had 2nd distal toe amputated in May 2023, follows with outside podiatry. [facility] She had been prescribed PO antibiotics but unclear if she actually took this. She was putting essential oils/\"black salve\" on wounds with concern for worsening. Aerobic swab of drainage from 2nd toe in outside records at time of amputation with - Klebsiella oxytoca, Strep agalactiae, Staph aureus (MSSA), and Haemophilus parainfluenzae. She now has ulceration on left 3rd toe, with continued concern for 2nd toe wound with drainage. She denies worsening pain in foot - does have some sensation, some erythema and edema. 6/8 Foot xray with amputation of great toe and partial 2nd toe, cortical disruption of 3rd toe without obvious osteomyelitis. Ortho consulted here with no plan for intervention, recommend wound care and podiatry.     Blood cultures drawn 6/23 for sepsis and DKA with positive Staph epidermidis in 2/2 sets, negative mecA gene on Verigene. She was started on IV vancomycin 6/24. Repeat blood cultures 6/24 with NGTD. Remains afebrile since admission. MRSA nares negative. She endorses some epigastric and central chest discomfort today, improved after bowel movement. Denies burning or crushing sensation. Feels            Review of Systems:     Targeted 10-point ROS is negative except as mentioned " above in HPI        Antimicrobial history:     Antimicrobials:  Levofloxacin 6/22 - present  Flagyl 6/22 - present  Vancomycin 6/24 - present         Past Medical History:     Past Medical History:   Diagnosis Date     Type 2 diabetes mellitus with diabetic polyneuropathy (H)             Past Surgical History:   History reviewed. No pertinent surgical history.         Family History:     History reviewed. No pertinent family history.         Social History:     Social History     Tobacco Use     Smoking status: Not on file     Smokeless tobacco: Not on file   Substance Use Topics     Alcohol use: Not on file     History   Sexual Activity     Sexual activity: Not on file            Current Medications:       acetaminophen  975 mg Oral Q8H     chlorhexidine  15 mL Swish & Spit BID     insulin aspart  1-7 Units Subcutaneous TID AC     insulin aspart  1-5 Units Subcutaneous At Bedtime     insulin aspart   Subcutaneous TID w/meals     insulin glargine  20 Units Subcutaneous Q24H     [Held by provider] levofloxacin  750 mg Intravenous Q24H     levofloxacin  750 mg Oral Daily     lidocaine (PF)  10 mL Infiltration Once     [Held by provider] metroNIDAZOLE  500 mg Intravenous Q12H     metroNIDAZOLE  500 mg Oral BID     pantoprazole  40 mg Intravenous Daily with breakfast     sodium chloride (PF)  3 mL Intracatheter Q8H     vancomycin  2,000 mg Intravenous Q12H            Allergies:     Allergies   Allergen Reactions     Cephalosporins      Other reaction(s): *Unknown     Piperacillin-Tazobactam In Dex Hives     Statins Muscle Pain (Myalgia)     Cephalexin Rash     Clindamycin Rash     possible rash, was on zosyn at the same time       Penicillins Hives and Rash            Physical Exam:   Vitals were reviewed  Patient Vitals for the past 24 hrs:   BP Temp Temp src Pulse Resp SpO2 Weight   06/29/23 0606 139/52 98.4  F (36.9  C) Oral 64 16 94 % --   06/28/23 2138 (!) 162/62 98.4  F (36.9  C) Oral 72 16 96 % --   06/28/23 1354  (!) 148/55 97.9  F (36.6  C) Oral 63 16 94 % --   06/28/23 0909 -- -- -- -- -- -- 124.7 kg (274 lb 14.4 oz)       Physical Examination:   Constitutional: Adult female seen sitting up in bed, in NAD. Alert and interactive.   HEENT: NCAT, anicteric sclerae, conjunctiva clear. EOMI without pain. Moist mucous membranes without lesions or thrush. Swelling and erythema over R maxilla infraorbitally with pain to touch, improved induration. No external drainage. Has swelling to lateral/posterior neck, mild submandibular swelling, no submental swelling. Penrose drain seen in buccal mucosa, edema of lips.  Respiratory: Non-labored breathing, good air exchange. Lungs are clear to auscultation bilaterally, without wheezing, crackles or rhonchi. No cough noted.   Cardiovascular: Regular rate and rhythm with no murmur, rub or gallop.  GI: Normoactive BS. Abdomen is soft, non-distended, and non-tender to palpation. No rigidity or guarding.  Skin: Warm and dry.  Musculoskeletal: Left lower extremity (photos in media tab), deferred exam during this encounter  Neurologic: A &O x3, speech normal, answering questions appropriately. Moves all extremities spontaneously. Grossly non-focal.  Neuropsychiatric: Mentation and affect normal/appropriate.    Lines and devices:  PIV is c/d/i with no erythema, drainage, or tenderness.    Labs, Microbiology and Imaging services are reviewed.          Laboratory Data:     Inflammatory Markers    Recent Labs   Lab Test 06/23/23  1422   SED 40*       Hematology Studies    Recent Labs   Lab Test 06/28/23  0815 06/27/23  0943 06/26/23  0704 06/24/23  0601 06/23/23  0622 06/22/23  1640   WBC 5.2 5.2 5.0 12.2* 15.1* 12.2*   HGB 12.5 13.0 12.2 12.8 13.8 14.6   MCV 92 93 93 93 92 93    294 231 276 251 239       Metabolic Studies     Recent Labs   Lab Test 06/29/23  0639 06/28/23  0815 06/27/23  0942 06/26/23  0704 06/25/23  0756    142 141 141 139   POTASSIUM 3.5 3.5 3.5 3.6 3.5   CHLORIDE 106  105 106 105 105   CO2 25 23 22 22 22   BUN 5.0* 4.7* 5.4* 5.4* 6.1   CR 0.50* 0.51 0.49* 0.48* 0.44*   GFRESTIMATED >90 >90 >90 >90 >90       Hepatic Studies    Recent Labs   Lab Test 06/27/23  0942 06/26/23  0704 06/25/23  0756 06/23/23  0622 06/22/23  1640   BILITOTAL 0.2 0.2 0.3 0.4 0.5   ALKPHOS 92 88 94 107* 102   ALBUMIN 3.6 3.3* 3.2* 4.0 4.2   AST 16 20 30 18 16   ALT 12 11 16 12 5       Microbiology:  Culture   Date Value Ref Range Status   06/28/2023 No growth after 12 hours  Preliminary   06/28/2023 No growth after 12 hours  Preliminary   06/27/2023 No growth after 1 day  Preliminary   06/26/2023 No growth after 2 days  Preliminary   06/25/2023 No growth after 3 days  Preliminary   06/24/2023 No growth after 4 days  Preliminary   06/24/2023 No growth after 4 days  Preliminary   06/24/2023 Culture in progress  Preliminary   06/24/2023 4+ Actinomyces odontolyticus (A)  Preliminary     Comment:     Susceptibilities not routinely done, refer to antibiogram to view typical susceptibility profiles   06/24/2023 3+ Streptococcus intermedius (A)  Preliminary     Comment:     Not isolated or reported on routine aerobic cultureThis organism is susceptible to ampicillin, penicillin, vancomycin and the cephalosporins. If treatment is required and your patient is allergic to penicillin, contact the microbiology lab within 5   days to request susceptibility testing.   06/24/2023 4+ Streptococcus anginosus (A)  Final     Comment:     This organism is susceptible to ampicillin, penicillin, vancomycin and the cephalosporins. If treatment is required and your patient is allergic to penicillin, contact the microbiology lab within 5 days to request susceptibility testing.Beta hemolyt  ic strain   06/24/2023 4+ Streptococcus mitis group (A)  Final     Comment:     This organism is susceptible to ampicillin, penicillin, vancomycin and the cephalosporins. If treatment is required and your patient is allergic to penicillin,  contact the microbiology lab within 5 days to request susceptibility testing.   06/24/2023 4+ Streptococcus anginosus (A)  Final     Comment:     This organism is susceptible to ampicillin, penicillin, vancomycin and the cephalosporins. If treatment is required and your patient is allergic to penicillin, contact the microbiology lab within 5 days to request susceptibility testing.   06/24/2023 4+ Actinomyces odontolyticus (A)  Final     Comment:     Susceptibilities not routinely done, refer to antibiogram to view typical susceptibility profiles   06/23/2023 Positive on the 1st day of incubation (A)  Final   06/23/2023 Staphylococcus epidermidis (AA)  Final     Comment:     1 of 2 bottles   06/23/2023 Positive on the 1st day of incubation (A)  Final   06/23/2023 Staphylococcus epidermidis (AA)  Final     Comment:     2 of 2 bottlesSusceptibilities done on previous cultures   06/23/2023 No Growth  Final       Urine Studies    Recent Labs   Lab Test 06/23/23  0915   LEUKEST Small*   WBCU 24*       IMAGING   CT Facial Bones with Contrast  Result Date: 6/22/2023  Impression: 1. Extensive inflammatory change about the right maxillary and mandibular subcutaneous soft tissues. Questionable small fluid collection along the right hemimandible. 2. Reactive bilateral lymph nodes, noted above. 3. Right maxillary sinusitis without definite dentigerous cyst or odontogenic origin.     Right foot Xray - reviewed

## 2023-06-30 ENCOUNTER — APPOINTMENT (OUTPATIENT)
Dept: CT IMAGING | Facility: CLINIC | Age: 58
DRG: 856 | End: 2023-06-30
Payer: COMMERCIAL

## 2023-06-30 LAB
ANION GAP SERPL CALCULATED.3IONS-SCNC: 13 MMOL/L (ref 7–15)
BACTERIA BLD CULT: NO GROWTH
BUN SERPL-MCNC: 5.9 MG/DL (ref 6–20)
CALCIUM SERPL-MCNC: 8.7 MG/DL (ref 8.6–10)
CHLORIDE SERPL-SCNC: 109 MMOL/L (ref 98–107)
CREAT SERPL-MCNC: 0.49 MG/DL (ref 0.51–0.95)
DEPRECATED HCO3 PLAS-SCNC: 23 MMOL/L (ref 22–29)
GFR SERPL CREATININE-BSD FRML MDRD: >90 ML/MIN/1.73M2
GLUCOSE BLDC GLUCOMTR-MCNC: 116 MG/DL (ref 70–99)
GLUCOSE BLDC GLUCOMTR-MCNC: 126 MG/DL (ref 70–99)
GLUCOSE BLDC GLUCOMTR-MCNC: 83 MG/DL (ref 70–99)
GLUCOSE BLDC GLUCOMTR-MCNC: 86 MG/DL (ref 70–99)
GLUCOSE BLDC GLUCOMTR-MCNC: 89 MG/DL (ref 70–99)
GLUCOSE SERPL-MCNC: 93 MG/DL (ref 70–99)
PHOSPHATE SERPL-MCNC: 3.5 MG/DL (ref 2.5–4.5)
POTASSIUM SERPL-SCNC: 3.6 MMOL/L (ref 3.4–5.3)
SODIUM SERPL-SCNC: 145 MMOL/L (ref 136–145)
VANCOMYCIN SERPL-MCNC: 11.7 UG/ML

## 2023-06-30 PROCEDURE — 999N000127 HC STATISTIC PERIPHERAL IV START W US GUIDANCE

## 2023-06-30 PROCEDURE — 250N000013 HC RX MED GY IP 250 OP 250 PS 637: Performed by: HOSPITALIST

## 2023-06-30 PROCEDURE — 80048 BASIC METABOLIC PNL TOTAL CA: CPT | Performed by: HOSPITALIST

## 2023-06-30 PROCEDURE — 80202 ASSAY OF VANCOMYCIN: CPT | Performed by: HOSPITALIST

## 2023-06-30 PROCEDURE — 70491 CT SOFT TISSUE NECK W/DYE: CPT

## 2023-06-30 PROCEDURE — 70491 CT SOFT TISSUE NECK W/DYE: CPT | Mod: 26 | Performed by: STUDENT IN AN ORGANIZED HEALTH CARE EDUCATION/TRAINING PROGRAM

## 2023-06-30 PROCEDURE — 99233 SBSQ HOSP IP/OBS HIGH 50: CPT | Performed by: STUDENT IN AN ORGANIZED HEALTH CARE EDUCATION/TRAINING PROGRAM

## 2023-06-30 PROCEDURE — 99232 SBSQ HOSP IP/OBS MODERATE 35: CPT | Performed by: PODIATRIST

## 2023-06-30 PROCEDURE — 99232 SBSQ HOSP IP/OBS MODERATE 35: CPT | Performed by: NURSE PRACTITIONER

## 2023-06-30 PROCEDURE — 250N000009 HC RX 250: Performed by: HOSPITALIST

## 2023-06-30 PROCEDURE — 36415 COLL VENOUS BLD VENIPUNCTURE: CPT | Performed by: HOSPITALIST

## 2023-06-30 PROCEDURE — 84100 ASSAY OF PHOSPHORUS: CPT | Performed by: HOSPITALIST

## 2023-06-30 PROCEDURE — 250N000013 HC RX MED GY IP 250 OP 250 PS 637

## 2023-06-30 PROCEDURE — 99418 PROLNG IP/OBS E/M EA 15 MIN: CPT | Performed by: STUDENT IN AN ORGANIZED HEALTH CARE EDUCATION/TRAINING PROGRAM

## 2023-06-30 PROCEDURE — 120N000002 HC R&B MED SURG/OB UMMC

## 2023-06-30 PROCEDURE — 99233 SBSQ HOSP IP/OBS HIGH 50: CPT | Performed by: HOSPITALIST

## 2023-06-30 PROCEDURE — 250N000011 HC RX IP 250 OP 636: Performed by: HOSPITALIST

## 2023-06-30 RX ORDER — DOXYCYCLINE 100 MG/1
100 CAPSULE ORAL EVERY 12 HOURS
Qty: 60 CAPSULE | Refills: 0 | Status: SHIPPED | OUTPATIENT
Start: 2023-06-30 | End: 2023-07-30

## 2023-06-30 RX ORDER — DOXYCYCLINE 100 MG/1
100 CAPSULE ORAL EVERY 12 HOURS SCHEDULED
Status: DISCONTINUED | OUTPATIENT
Start: 2023-06-30 | End: 2023-07-01 | Stop reason: HOSPADM

## 2023-06-30 RX ORDER — CHLORHEXIDINE GLUCONATE ORAL RINSE 1.2 MG/ML
15 SOLUTION DENTAL 2 TIMES DAILY
Qty: 210 ML | Refills: 0 | Status: SHIPPED | OUTPATIENT
Start: 2023-06-30 | End: 2023-07-07

## 2023-06-30 RX ORDER — METRONIDAZOLE 500 MG/1
500 TABLET ORAL 3 TIMES DAILY
Status: DISCONTINUED | OUTPATIENT
Start: 2023-06-30 | End: 2023-07-01 | Stop reason: HOSPADM

## 2023-06-30 RX ORDER — METRONIDAZOLE 500 MG/1
500 TABLET ORAL 3 TIMES DAILY
Qty: 90 TABLET | Refills: 0 | Status: SHIPPED | OUTPATIENT
Start: 2023-06-30 | End: 2023-07-30

## 2023-06-30 RX ORDER — FAMOTIDINE 20 MG/1
20 TABLET, FILM COATED ORAL 2 TIMES DAILY PRN
Qty: 20 TABLET | Refills: 0 | Status: ON HOLD | OUTPATIENT
Start: 2023-06-30 | End: 2024-05-07

## 2023-06-30 RX ORDER — ONDANSETRON 4 MG/1
4 TABLET, ORALLY DISINTEGRATING ORAL EVERY 6 HOURS PRN
Qty: 30 TABLET | Refills: 1 | Status: ON HOLD | OUTPATIENT
Start: 2023-06-30 | End: 2024-05-07

## 2023-06-30 RX ORDER — LEVOFLOXACIN 750 MG/1
750 TABLET, FILM COATED ORAL DAILY
Qty: 30 TABLET | Refills: 0 | Status: SHIPPED | OUTPATIENT
Start: 2023-06-30 | End: 2023-07-30

## 2023-06-30 RX ORDER — IOPAMIDOL 755 MG/ML
100 INJECTION, SOLUTION INTRAVASCULAR ONCE
Status: COMPLETED | OUTPATIENT
Start: 2023-06-30 | End: 2023-06-30

## 2023-06-30 RX ADMIN — METRONIDAZOLE 500 MG: 500 TABLET ORAL at 09:29

## 2023-06-30 RX ADMIN — SODIUM CHLORIDE, PRESERVATIVE FREE 60 ML: 5 INJECTION INTRAVENOUS at 14:00

## 2023-06-30 RX ADMIN — IOPAMIDOL 100 ML: 755 INJECTION, SOLUTION INTRAVENOUS at 14:00

## 2023-06-30 RX ADMIN — METRONIDAZOLE 500 MG: 500 TABLET ORAL at 19:57

## 2023-06-30 RX ADMIN — LEVOFLOXACIN 750 MG: 750 TABLET, FILM COATED ORAL at 19:57

## 2023-06-30 RX ADMIN — CHLORHEXIDINE GLUCONATE 15 ML: 1.2 SOLUTION ORAL at 19:57

## 2023-06-30 RX ADMIN — CHLORHEXIDINE GLUCONATE 15 ML: 1.2 SOLUTION ORAL at 09:29

## 2023-06-30 RX ADMIN — DOXYCYCLINE HYCLATE 100 MG: 100 CAPSULE ORAL at 19:57

## 2023-06-30 ASSESSMENT — ACTIVITIES OF DAILY LIVING (ADL)
ADLS_ACUITY_SCORE: 24

## 2023-06-30 NOTE — PROGRESS NOTES
[facility]: FOLLOW UP NOTE     Patient:  Glenny Campos   Date of birth 1965, Medical record number [id number]  Date of Visit:  06/30/2023  Date of Admission: 6/22/2023  Consult Requester:Jhoana Estrella DO          Assessment and Recommendations:     ID Problem List:   1. Right sided dental infection following dental procedure complicated by facial cellulitis with (imaging) hemimandible abscess (s/p bedside I&D 6/24) and maxillary sinusitis  - Being treated as SSTI, no clear evidence of bone involvement  - Cultures polymicrobial growth (Str anginosusx2, Str mitis, Str intermedius, Actinomyces odontolyticus)    2. Chronic wound of left foot - 2nd and 3rd digits  - MRI foot 6/28/2023 suggestive of osteomyelitis involving second proximal phalanx, third distal and middle phalanges; also suspected early osteomyelitis changes of 5th distal phalanx.    3. Positive blood culture - Staph epidermidis, +6/23 in 2/2 sets with repeat NGTD - unclear if true infection vs pathogen  4. History of osteomyelitis of left great toe s/p amputation 2020 through metatarsal head, and distal 2nd toe s/p amputation 05/2023  5. T2DM with polyneuropathy (A1C 6.8)  6. Multiple antibiotics allergies - penicillin, cephalosporins    RECOMMENDATION:   1. Continue current regimen - IV vancomycin, PO levofloxacin, and PO flagyl while inpatient  2. Follow up on pending, additional susceptibilities of Vancomycin, Levofloxacin against isolates from I&D 6/24/2023  3. Appreciate vascular surgery inputs, arterial doppler with good blood flow and defer surgical intervention per podiatry.  4. Per podiatry assessment today, plan for outpatient surgery and likely scheduled ~7/18 once patient returns from her trip to [place].  5. Given the growth of Actinomyces species from I&D, which usually requires longer duration of antibiotics (6-12 months, a few weeks to months of iv and then po).   6. Advise against travel while on iv  antibiotics as requires close monitoring of vancomycin level and side effects like nephrotoxicity, intermittent QTc monitoring with levofloxacin  7. Given that upcoming trip to [place], patient has opted for oral antibiotic option. Recommend to switch from iv vancomycin to po doxycycline 100mg BID, and can continue po levofloxacin 750mg daily, po metronidazole 500mg TID  8. Patient has verbalized understanding of possibility of progression of infection and/or delay healing (facial cellulitis/abscess and left foot osteomyelitis).   9. Suggest ID clinic follow up in 2-3 weeks. Request to primary to provide ID clinic referral.    10. Consider outpatient allergy assessment regarding allergy to multiple antibiotics    ASSESSMENT:   Glenny Campos is a 57 year old female with past medical history significant for Type 2 DM with retinopathy, peripheral neuropathy, previous left great toe amputation (2020, distal 2nd toe 2023) due to osteomyelitis with  chronic left foot wound who was admitted 6/22 for right sided dental infection following recent dental procedure (6/20) complicated by facial cellulitis and abscess s/p bedside I&D (culture pending). Hospital course complicated by DKA and positive blood cultures for Staph epidermidis (started on IV vancomycin). Concern also for nonhealing chronic left foot wound for likely osteomyelitis. She has multiple reported antibiotic allergies/intolerances which make choosing a regimen difficult, in addition to the fact that patient refused to take oral antibiotics following her dental procedure and likely also after her recent May toe amputation (outside podiatry concerned for poor healing 6/7). She uses essential oils instead of antibiotics - both topically to foot and ingesting.     Recommend continue current antimicrobial regimen - IV vancomycin, PO levofloxacin (oral lena, gram negatives) and PO flagyl (anaerobes, oral coverage) to cover dental infection, possible blood  infection, and possible left foot osteomyelitis at this time. It is unclear if blood cultures from 6/23 represent true infection (possible from oral source vs foot) vs contaminant as patient remains afebrile, blood cultures collected only due to DKA workup, and repeat cultures collected at start of IV vancomycin are NGTD. MRI foot suggestive of osteomyelitis involving multiple areas. Being evaluated by vascular surgery and with arterial doppler assuring for adequate blood flow, deferred surgical intervention to Podiatry.      Would appreciate if OMFS/dental team could clarify with her outside dental provider if any cadaver bone was used (patient/family report this was mentioned, though not sure if it was truly used or if discussing future needs?), if any current concern for exposed bone around dental infection, or if any further imaging from dental perspective. Repeat CT Face 6/30 without obvious bone concern, more soft tissue inflammation, no drainable collection.     Overall patient would need at least 6 weeks course of antibiotics for foot osteomyelitis, start date depends on if any plans for surgical intervention and source control. Regarding facial abscess s/p ID, whereby cultures with polymicrobial growth including Actinomyces species which requires longer course of antibiotics (at times 6-12 months based on clinical response, initially iv and then transition to po). Usually Streptococcus species and Actinomyces spp are susceptible to penicillin, cephalosporin group of antibiotics. However, patient has documented allergy to these antibiotics. That said, requested microbiology lab to test susceptibilities of all isolates against vancomycin, levofloxacin to ensure the regimen is adequate, which is pending.      Of note, patient and her  at bedside, have decided to travel to [place], and likely return on 7/17. Writer explained about importance of close monitoring while on iv vancomycin (trough level and also  for nephrotoxicity), while likely requires intermittent QTc monitoring being on levofloxacin along with clinic follow up. Advise against travel while on iv antibiotics. Also, decided to postpone surgical intervention with podiatry upon return from a trip likely around 7/18. Patient has verbalized understanding of risk of delaying care, which may lead to progression of infection.     At this time, can continue po levofloxacin, metronidazole. Upon discharge can switch iv vancomycin to po doxycycline. Encourage outpatient follow up with ID in 2-3 weeks, and also with Podiatry and OMFS.     Recommendations discussed with the primary team.    ID team will continue to follow. Please reach out if any questions or concerns.    Total time spent during this encounter (chart review, documentation, MDM, coordination of care): 65 minutes    Christiana John MD  Infectious Disease Staff Physician  Pager: [phone]  rankdesk vika   Date: 6/30/2023           Interval history:     Patient is seen and examined at bedside today. Notes improvement in facial swelling and redness.          History of Present Illness, as adopted from initial ID consult 5/25/2023:     Glenny Campos is a 57 year old female with past medical history significant for Type 2 DM with retinopathy, peripheral neuropathy, previous left great toe amputation (2020, 2023) and chronic left foot wound who was admitted 6/22 for right facial cellulitis and dental infection, course complicated by DKA.     She presented to ED 6/22 with increased pain, swelling of right face following dental procedure for right 3rd tooth two days prior to admission with tooth pain beginning 6/18. She did not take post-procedure antibiotics as recommended to her and took essential oils instead. She denied fevers, abdominal pain. Had upset stomach after taking pain medication. She endorses nausea, vomiting, and possible chills, denied rigors. Denied drainage in mouth or foul taste.  at  "bedside and friend Amaris (homeopathic work colleague) on phone provide additional history. Overall, Glenny is a somewhat difficult historian with limited insight on my interview. Amaris more concerned for foot infection as possible source than dental, and wants foot addressed as well.     In ED, labs notable for WBC 12.2, lactic 1.4, , ESR 40. Has remained afebrile, vitals stable. Facial CT with extensive inflammatory changes around R maxillary and mandibular soft tissue with possible small fluid collection. Dental and OMFS consulted - advised I&D, however initially patient refused I&D under local anesthesia or nitrous. She reports multiple antibiotic allergies - penicillins (hives, rash), clindamycin and Zosyn at same time had rash, cephalexin (rash), and cephalosporins (unknown). She is unable to describe reactions or rash in detail to me. Mentioned one rash that she could recall was \"red and on her bottom only\", other rashes \"other places\". She denies any history of anaphylaxis. Due to multiple reported allergies, was started on levo + flagyl. Ultimately had I&D on 6/24 by OMFS at bedside, drainage of gross purulence noted and s/p penrose drain placement. Culture from abscess pending.     She has current chronic ulceration of left foot with edema and eschar on 3rd toe. Has previous amputation of left great toe in 2020 due to OM, had 2nd distal toe amputated in May 2023, follows with outside podiatry. Last seen by podiatry on 6/7 - [facility] in [facility]. She had been prescribed PO antibiotics but unclear if she actually took this. She was putting essential oils/\"black salve\" on wounds with concern for worsening. Aerobic swab of drainage from 2nd toe in outside records at time of amputation with - Klebsiella oxytoca, Strep agalactiae, Staph aureus (MSSA), and Haemophilus parainfluenzae. She now has ulceration on left 3rd toe, with continued concern for 2nd toe wound with drainage. She denies worsening " pain in foot - does have some sensation, some erythema and edema. 6/8 Foot xray with amputation of great toe and partial 2nd toe, cortical disruption of 3rd toe without obvious osteomyelitis. Ortho consulted here with no plan for intervention, recommend wound care and podiatry.     Blood cultures drawn 6/23 for sepsis and DKA with positive Staph epidermidis in 2/2 sets, negative mecA gene on Verigene. She was started on IV vancomycin 6/24. Repeat blood cultures 6/24 with NGTD. Remains afebrile since admission. MRSA nares negative. She endorses some epigastric and central chest discomfort today, improved after bowel movement. Denies burning or crushing sensation. Feels            Review of Systems:     Targeted 10-point ROS is negative except as mentioned above in HPI        Antimicrobial history:     Antimicrobials:  Levofloxacin 6/22 - present  Flagyl 6/22 - present  Vancomycin 6/24 - present         Past Medical History:     Past Medical History:   Diagnosis Date     Type 2 diabetes mellitus with diabetic polyneuropathy (H)             Past Surgical History:   History reviewed. No pertinent surgical history.         Family History:     History reviewed. No pertinent family history.         Social History:     Social History     Tobacco Use     Smoking status: Not on file     Smokeless tobacco: Not on file   Substance Use Topics     Alcohol use: Not on file     History   Sexual Activity     Sexual activity: Not on file            Current Medications:       acetaminophen  975 mg Oral Q8H     chlorhexidine  15 mL Swish & Spit BID     insulin aspart  1-7 Units Subcutaneous TID AC     insulin aspart  1-5 Units Subcutaneous At Bedtime     insulin aspart   Subcutaneous TID w/meals     insulin glargine  18 Units Subcutaneous Q24H     insulin lispro   Device See Admin Instructions     [Held by provider] levofloxacin  750 mg Intravenous Q24H     levofloxacin  750 mg Oral Daily     lidocaine (PF)  10 mL Infiltration Once      [Held by provider] metroNIDAZOLE  500 mg Intravenous Q12H     metroNIDAZOLE  500 mg Oral BID     pantoprazole  40 mg Intravenous Daily with breakfast     sodium chloride (PF)  3 mL Intracatheter Q8H     vancomycin  2,000 mg Intravenous Q12H            Allergies:     Allergies   Allergen Reactions     Cephalosporins      Other reaction(s): *Unknown     Piperacillin-Tazobactam In Dex Hives     Statins Muscle Pain (Myalgia)     Cephalexin Rash     Clindamycin Rash     possible rash, was on zosyn at the same time       Penicillins Hives and Rash            Physical Exam:   Vitals were reviewed  Patient Vitals for the past 24 hrs:   BP Temp Temp src Pulse Resp SpO2 Weight   06/30/23 0555 119/47 98.6  F (37  C) Oral 62 14 95 % --   06/29/23 2153 (!) 146/64 98.3  F (36.8  C) Oral 75 20 94 % --   06/29/23 1458 (!) 150/67 97.6  F (36.4  C) Oral 71 16 96 % --   06/29/23 1041 -- -- -- -- -- -- 125.3 kg (276 lb 3.2 oz)       Physical Examination:   Constitutional: Adult female seen sitting up in bed, in NAD. Alert and interactive.   HEENT: NCAT, anicteric sclerae, conjunctiva clear. EOMI without pain. Moist mucous membranes without lesions or thrush. Swelling and erythema over R maxilla infraorbitally with pain to touch, improved induration though palpable hard to touch area. No external drainage. Has swelling to lateral/posterior neck, mild submandibular swelling, no submental swelling. Penrose drain seen in buccal mucosa, edema of lips.  Respiratory: Non-labored breathing, good air exchange. Lungs are clear to auscultation bilaterally, without wheezing, crackles or rhonchi. No cough noted.   Cardiovascular: Regular rate and rhythm with no murmur, rub or gallop.  GI: Normoactive BS. Abdomen is soft, non-distended, and non-tender to palpation. No rigidity or guarding.  Skin: Warm and dry.  Musculoskeletal: Left lower extremity (photos in media tab), deferred exam during this encounter  Neurologic: A &O x3, speech normal,  answering questions appropriately. Moves all extremities spontaneously. Grossly non-focal.  Neuropsychiatric: Mentation and affect normal/appropriate.    Lines and devices:  PIV is c/d/i with no erythema, drainage, or tenderness.    Labs, Microbiology and Imaging services are reviewed.          Laboratory Data:     Inflammatory Markers    Recent Labs   Lab Test 06/23/23  1422   SED 40*       Hematology Studies    Recent Labs   Lab Test 06/28/23  0815 06/27/23  0943 06/26/23  0704 06/24/23  0601 06/23/23  0622 06/22/23  1640   WBC 5.2 5.2 5.0 12.2* 15.1* 12.2*   HGB 12.5 13.0 12.2 12.8 13.8 14.6   MCV 92 93 93 93 92 93    294 231 276 251 239       Metabolic Studies     Recent Labs   Lab Test 06/29/23  0639 06/28/23  0815 06/27/23  0942 06/26/23  0704 06/25/23  0756    142 141 141 139   POTASSIUM 3.5 3.5 3.5 3.6 3.5   CHLORIDE 106 105 106 105 105   CO2 25 23 22 22 22   BUN 5.0* 4.7* 5.4* 5.4* 6.1   CR 0.50* 0.51 0.49* 0.48* 0.44*   GFRESTIMATED >90 >90 >90 >90 >90       Hepatic Studies    Recent Labs   Lab Test 06/27/23  0942 06/26/23  0704 06/25/23  0756 06/23/23  0622 06/22/23  1640   BILITOTAL 0.2 0.2 0.3 0.4 0.5   ALKPHOS 92 88 94 107* 102   ALBUMIN 3.6 3.3* 3.2* 4.0 4.2   AST 16 20 30 18 16   ALT 12 11 16 12 5       Microbiology:  Culture   Date Value Ref Range Status   06/28/2023 No growth after 1 day  Preliminary   06/28/2023 No growth after 1 day  Preliminary   06/27/2023 No growth after 2 days  Preliminary   06/26/2023 No growth after 3 days  Preliminary   06/25/2023 No growth after 4 days  Preliminary   06/24/2023 No Growth  Final   06/24/2023 No Growth  Final   06/24/2023 Culture in progress  Preliminary   06/24/2023 4+ Actinomyces odontolyticus (A)  Preliminary     Comment:     Susceptibilities not routinely done, refer to antibiogram to view typical susceptibility profiles   06/24/2023 3+ Streptococcus intermedius (A)  Preliminary     Comment:     Not isolated or reported on routine aerobic  cultureThis organism is susceptible to ampicillin, penicillin, vancomycin and the cephalosporins. If treatment is required and your patient is allergic to penicillin, contact the microbiology lab within 5   days to request susceptibility testing.   06/24/2023 4+ Streptococcus anginosus (A)  Preliminary     Comment:     This organism is susceptible to ampicillin, penicillin, vancomycin and the cephalosporins. If treatment is required and your patient is allergic to penicillin, contact the microbiology lab within 5 days to request susceptibility testing.Beta hemolyt  ic strain   06/24/2023 4+ Streptococcus mitis group (A)  Preliminary     Comment:     This organism is susceptible to ampicillin, penicillin, vancomycin and the cephalosporins. If treatment is required and your patient is allergic to penicillin, contact the microbiology lab within 5 days to request susceptibility testing.   06/24/2023 4+ Streptococcus anginosus (A)  Preliminary     Comment:     This organism is susceptible to ampicillin, penicillin, vancomycin and the cephalosporins. If treatment is required and your patient is allergic to penicillin, contact the microbiology lab within 5 days to request susceptibility testing.   06/24/2023 4+ Actinomyces odontolyticus (A)  Preliminary     Comment:     Susceptibilities not routinely done, refer to antibiogram to view typical susceptibility profiles   06/23/2023 Positive on the 1st day of incubation (A)  Final   06/23/2023 Staphylococcus epidermidis (AA)  Final     Comment:     1 of 2 bottles   06/23/2023 Positive on the 1st day of incubation (A)  Final   06/23/2023 Staphylococcus epidermidis (AA)  Final     Comment:     2 of 2 bottlesSusceptibilities done on previous cultures   06/23/2023 No Growth  Final       Urine Studies    Recent Labs   Lab Test 06/23/23  0915   LEUKEST Small*   WBCU 24*       IMAGING   CT Facial Bones with Contrast  Result Date: 6/22/2023  Impression: 1. Extensive inflammatory  change about the right maxillary and mandibular subcutaneous soft tissues. Questionable small fluid collection along the right hemimandible. 2. Reactive bilateral lymph nodes, noted above. 3. Right maxillary sinusitis without definite dentigerous cyst or odontogenic origin.     Right foot Xray - reviewed

## 2023-06-30 NOTE — PROGRESS NOTES
Pt refusing IV placement and AM dose of vancomycin IV until she speaks to ID.  Primary team paged.

## 2023-06-30 NOTE — PLAN OF CARE
Goal Outcome Evaluation:      Plan of Care Reviewed With: patient    Overall Patient Progress: improving    Outcome Evaluation: A&O x 4.  VSS on room air.  c/o pain at a 4/10 in her face but declines tylenol.  Tolerates soft diet with no n/v.  Poor appetite noted.  CT of face today.  PIV saline locked.  Refused IV vanco today.  Team aware.  Dressing changed to L foot per orders.  Discharge pending abx plan.  Possibly leaving today or tomorrow, depending on plan from ID.

## 2023-06-30 NOTE — PROGRESS NOTES
57-year-old female is POD7 (06/23/2023) status post I&D with placement of a Penrose drain (x1) of a right canine space abscess.  She has had resolution of the vestibular space abscess with a notable decrease in right maxillary vestibular swelling, erythema, and pain.  There is no purulent drainage from the Penrose drain.  However, there remains a well-circumscribed area of induration along the right infraorbital soft tissue which is mildly tender to palpation.  Given the persistence of the indurated swelling, we would recommend a CT soft tissue neck with contrast to evaluate for a fluid collection (ordered).  In the setting of a drainable abscess, an I&D under local anesthesia would be warranted and completed bedside by OMFS prior to discharge.    Please contact the OMFS resident on-call with questions or concerns.     Discussed with chief resident and staff.     Evelio Thomas DDS  Oral & Maxillofacial Surgery, PGY-1

## 2023-06-30 NOTE — PROGRESS NOTES
Care Management Follow Up    Length of Stay (days): 8    Expected Discharge Date: 06/30/2023     Concerns to be Addressed:       Patient plan of care discussed at interdisciplinary rounds: Yes    Anticipated Discharge Disposition:       Anticipated Discharge Services:    Anticipated Discharge DME:      Patient/family educated on Medicare website which has current facility and service quality ratings:    Education Provided on the Discharge Plan:    Patient/Family in Agreement with the Plan:      Referrals Placed by CM/SW:    Private pay costs discussed: Not applicable    Additional Information:  RNCC notified [facility] can not support Pt while she is traveling. At this time Pt is refusing a new PIV. Pt will need a PICC and PLC prior to discharge.     Barriers to IV Abx while traveling  - no clear clinic out of state for PICC dressing changes and labs needed  - Insurance only issues 7 days of meds at time, med stability only good for 9 days, assuming a dose change is not needed  - No clear way to manage vanc doses based on lab values obtained out of state    RNCC paged MD to discuss Abx options, awaiting response. Its possible national companies such as [employer] or [employer], may be able to accommodate the travel plans however the issue of finding a clinic for labs and PICC line care remains a barrier.    1350 Update: Pt will DC in the AM on PO Abx, will follow up on 7/18 when she is back from [place].     Care management will continue to follow and support safe discharge planning as needed.    Magi Connolly, RN  RNCC [place]

## 2023-06-30 NOTE — PLAN OF CARE
Goal Outcome Evaluation:      Plan of Care Reviewed With: patient, spouse    Overall Patient Progress: improvingOverall Patient Progress: improving    Outcome Evaluation: Tolerating soft diet with improve intake

## 2023-06-30 NOTE — PROGRESS NOTES
IP Diabetes Management  Daily Note           Assessment and Plan:   HPI: Glenny Campos is a 57 year old women with past medical history of Type 2 diabetes mellitus (on insulin pump and previously followed with endo) (complicated by retinopathy, neuropathy, prior left toe amputations), obesity, HLD, biliary colic who presents with sepsis secondary to dental infection, sinusitis and facial cellulitis (right sided).       Assessment:   1.  Type 2 diabetes, suboptimally controlled with retinopathy, amputation, neuropathy hemoglobin A1c 6.8 on this admission  2.  Impending diabetic ketoacidosis  VBG ph=7.37, AG=20 and Co2=15, resolved  3.  Infection/stress  induced hyperglycemia  4. obesity     Plan:    -Lantus 18 units at HS--->decrease to 15 units    -Novolog 1:15g CHO coverage with meals and snacks/supplements   -Novolog medium intensity sliding scale TID AC and HS   -BG monitoring TID AC, HS, 0200   -hypoglycemia protocol   -carb counting protocol   -no DM education needed   -she can place pump back on closer to discharge or time of discharge, see below.    When she resumes her Omnipod Dash, I created a new basal 3 profile that reflect her lower insulin needs currently.  Basal 1 was her PTA settings.  She can use basal 3 at discharge.  She is aware that Lantus given at bedtime and to use a temp basal of 0% until 23-24 hours since last lantus dose.     Basal 3 program:  Basal 0000 -0000: 0.7 units/hour  ISF: 45   Carb ratio: 1:15g CHO    Basal 1   00:00-11:29-->  1.05  11:30 am to 17:59-->  1.0  18:00 to 23:59-->  1.05  Total basal=24.85  ISF=1:45  Insulin to carb ratio: 1:15   Target Glucose=100-110  Total Gluc 4 hours      Outpatient follow up: will recommend outpatient follow up with [facility] endocrinology and CDE  Plan discussed with patient and patient's spouse.      Interval History and Assessment: interval glucose trend reviewed: BG running tight 80-90s.  Will reduce Lantus further tonight.  Eating  is less than PTA.   Podiatry was consulted as well as ID for potential need for amputation.  No plans yet, but patient would like to wait to go back to her Omnipod until this is decided.  Will remain on Lantus, novolog for time being.     When she resumes her Omnipod Dash, I created a new basal 3 profile that reflect her lower insulin needs currently.  Basal 1 was her PTA settings.  She can use basal 3 at discharge.  She is aware that Lantus given at bedtime and to use a temp basal of 0% until 23-24 hours since last lantus dose.     Labs:     Current nutritional intake and type: Orders Placed This Encounter      Mechanical/Dental Soft Diet      Planned Procedures/surgeries: TBD  Steroid planning: none  D5W-containing solutions/medications: none    PTA Diabetes Regimen:   Omnipod Dash (the following settings were confirmed from the PDM)  Basal 1                       (these are the settings basal 1 which she was using prior to the admission)  00:00-11:29-->  1.05  11:30 am to 17:59-->  1.0  18:00 to 23:59-->  1.05  Total basal=24.85  ISF=1:45  Insulin to carb ratio: 1:15   Target Glucose=100-110  Total Gluc 4 hours     Inactive Basal 2 (switched from the settings to basal 1 prior to the admission)  00:00-06:59--> 1.5  07:00-16:59--> 1  17:00--23:59-->2  Total basal 34.5        Omnipod Dash setting per Epic in 11/2/22     Basal 00:00-09:59 am-->  1.5  10:00 am to 21:59--> 1.0  22:00 to 23:59--> 1.5     ISF=1:55  1:18 carb ratio  Target=110-120       Discharge Planning: TBD           Diabetes History:   Type of Diabetes: Type 2 Diabetes Mellitus  Lab Results   Component Value Date    A1C 6.8 06/22/2023              Review of Systems:     The Review of Systems is negative other than noted in the Interval History.           Medications:     Current Facility-Administered Medications   Medication     acetaminophen (TYLENOL) tablet 975 mg     chlorhexidine (PERIDEX) 0.12 % solution 15 mL     dextrose 10% infusion      "glucose gel 15-30 g    Or     dextrose 50 % injection 25-50 mL    Or     glucagon injection 1 mg     famotidine (PEPCID) tablet 20 mg     HYDROmorphone (PF) (DILAUDID) injection 0.3-0.5 mg     insulin aspart (NovoLOG) injection (RAPID ACTING)     insulin aspart (NovoLOG) injection (RAPID ACTING)     insulin aspart (NovoLOG) injection (RAPID ACTING)     insulin aspart (NovoLOG) injection (RAPID ACTING)     insulin glargine (LANTUS PEN) injection 18 Units     insulin lispro (HumaLOG) 100 UNIT/ML vial for filling pump reservoir     [Held by provider] levofloxacin (LEVAQUIN) infusion 750 mg     levofloxacin (LEVAQUIN) tablet 750 mg     lidocaine (LMX4) cream     lidocaine (PF) (XYLOCAINE) 1 % injection 10 mL     lidocaine 1 % 0.1-1 mL     melatonin tablet 1 mg     metoclopramide (REGLAN) injection 10 mg     [Held by provider] metroNIDAZOLE (FLAGYL) infusion 500 mg     metroNIDAZOLE (FLAGYL) tablet 500 mg     naloxone (NARCAN) injection 0.2 mg    Or     naloxone (NARCAN) injection 0.4 mg    Or     naloxone (NARCAN) injection 0.2 mg    Or     naloxone (NARCAN) injection 0.4 mg     ondansetron (ZOFRAN ODT) ODT tab 4 mg    Or     ondansetron (ZOFRAN) injection 4 mg     oxyCODONE (ROXICODONE) tablet 5 mg     pantoprazole (PROTONIX) IV push injection 40 mg     Patient is already receiving mechanical prophylaxis     prochlorperazine (COMPAZINE) injection 10 mg    Or     prochlorperazine (COMPAZINE) tablet 10 mg    Or     prochlorperazine (COMPAZINE) suppository 25 mg     sodium chloride (PF) 0.9% PF flush 3 mL     sodium chloride (PF) 0.9% PF flush 3 mL     vancomycin (VANCOCIN) 2,000 mg in sodium chloride 0.9 % 500 mL intermittent infusion            Physical Exam:    /47 (BP Location: Right arm)   Pulse 62   Temp 98.6  F (37  C) (Oral)   Resp 14   Ht 1.778 m (5' 10\")   Wt 125.3 kg (276 lb 3.2 oz)   SpO2 95%   BMI 39.63 kg/m    General: pleasant, in no distress.  Sitting up in bed.    HEENT: normocephalic, " atraumatic. Oral mucous membranes moist. Erythema and swelling of right cheek.  Lungs: unlabored respiration, no cough  ABD: rounded, nondistended  Skin: warm and dry, no obvious lesions, left foot wrapped  MSK:  moves all extremities  Mental status:  alert, oriented to self, place, time  Psych:  affect, calm and appropriate interaction             Data:     Recent Labs   Lab 06/30/23  0214 06/29/23  2150 06/29/23  1751 06/29/23  1135 06/29/23  0639 06/29/23  0139   GLC 86 99 123* 109* 114* 93     Lab Results   Component Value Date    WBC 5.2 06/28/2023    WBC 5.2 06/27/2023    WBC 5.0 06/26/2023    HGB 12.5 06/28/2023    HGB 13.0 06/27/2023    HGB 12.2 06/26/2023    HCT 37.7 06/28/2023    HCT 39.5 06/27/2023    HCT 37.0 06/26/2023    MCV 92 06/28/2023    MCV 93 06/27/2023    MCV 93 06/26/2023     06/28/2023     06/27/2023     06/26/2023     Lab Results   Component Value Date     06/29/2023     06/28/2023     06/27/2023    POTASSIUM 3.5 06/29/2023    POTASSIUM 3.5 06/28/2023    POTASSIUM 3.5 06/27/2023    CHLORIDE 106 06/29/2023    CHLORIDE 105 06/28/2023    CHLORIDE 106 06/27/2023    CO2 25 06/29/2023    CO2 23 06/28/2023    CO2 22 06/27/2023    GLC 86 06/30/2023    GLC 99 06/29/2023     (H) 06/29/2023     Lab Results   Component Value Date    BUN 5.0 (L) 06/29/2023    BUN 4.7 (L) 06/28/2023    BUN 5.4 (L) 06/27/2023     No results found for: TSH  Lab Results   Component Value Date    AST 16 06/27/2023    AST 20 06/26/2023    AST 30 06/25/2023    ALT 12 06/27/2023    ALT 11 06/26/2023    ALT 16 06/25/2023    ALKPHOS 92 06/27/2023    ALKPHOS 88 06/26/2023    ALKPHOS 94 06/25/2023           40 minutes spent by me on the date of the encounter doing chart review, history and exam, documentation and further activities per the note        To contact Endocrine Diabetes service:   From 8AM-4PM: page inpatient diabetes provider that is following the patient  For questions or  updates from 4PM-8AM: page the diabetes job code for on call fellow: [phone]    DONALD Doshi, CNP  Inpatient Diabetes Management Service  Pager [phone]

## 2023-06-30 NOTE — DISCHARGE SUMMARY
"[facility]  Hospitalist Discharge Summary      Date of Admission:  6/22/2023  Date of Discharge:  6/30/2023  Discharging Provider: Rush Crowell MD  Discharge Service: Hospitalist Service, GOLD TEAM 10    Discharge Diagnoses     Sepsis 2/2 R Canine Space Abscess, Associated Facial Cellulitis and Sinusitis   S/p I&D of a right canine space abscess with placement of a penrose drain (x1) 6/24   Staph Epi + Blood cultures 3/4   Left Third Toe Wound with L foot Osteomyelitis   Insulin Dependent DM2  Likely DKA 2/2 infection as above    Clinically Significant Risk Factors     # DMII: A1C = 6.8 % (Ref range: <5.7 %) within past 6 months  # Obesity: Estimated body mass index is 39.5 kg/m  as calculated from the following:    Height as of this encounter: 1.778 m (5' 10\").    Weight as of this encounter: 124.9 kg (275 lb 4.8 oz).       Follow-ups Needed After Discharge   Follow-up Appointments     Adult [facility] Follow-up and recommended labs and tests      ASAP with Podiatry upon return from [place] trip; Tentatively outpatient   surgery on 7/18  Follow up upon return from [place] with ID team: for Antibiotics.  Follow up with OMFS as per their recommendations directly to you.      Appointments on [place] and/or [facility] (with [facility] or [facility]   provider or service). Call [phone] if you haven't heard regarding   these appointments within 7 days of discharge.        {Additional follow-up instructions/to-do's for PCP    :Ensure patient has follow up for Abx and Podiatry surgery.    Unresulted Labs Ordered in the Past 30 Days of this Admission     Date and Time Order Name Status Description    6/28/2023 12:02 AM Blood Culture Arm, Left Preliminary     6/28/2023 12:02 AM Blood Culture Arm, Right Preliminary     6/27/2023 12:02 AM Blood Culture Arm, Right Preliminary     6/26/2023 12:02 AM Blood Culture Hand, Right Preliminary     6/24/2023  8:52 AM Abscess Aerobic Bacterial " Culture Routine Preliminary     6/24/2023  6:48 AM Anaerobic Bacterial Culture Routine Preliminary       These results will be followed up by Discharge pool    Discharge Disposition   Discharged to home  Condition at discharge: Stable    Hospital Course     7/1/23 pt discharging on PO abx so she can make her trip to [place] as she has planned.  She was advised to obtain Picc line and continue IV Vanco, but defered this treatment.  Pt will have prompt follow up with ID and Podiatry upon her return from [place], which is expected on or about 7/17, departing 7/7/23 (all per patient).    56 yo female with history of DM2 on insulin pump complicated by retinopathy, peripheral neuropathy, and prior left toe amputations and chronic left foot wound, obesity, HLD admitted for sepsis 2/2 dental infection and R facial cellulitis. Hospital course complicated by DKA 2/2 infection. Concern for L foot osteomyelitis.      Today:  - Per Podiatry: Start Betadine dressings to the toe daily.  Do not keep this open to air.   -Outpatient Follow up with Podiatry, ID, Endocrine  -Continue PO LQ daily, PO flagyl 500 TID, start PO Doxy 100 mg BID  Discharge Instructions per OMFS:  - No lifting greater than 15 lbs, no driving while using narcotic pain medication, no strenuous exercise for 2 weeks.   - Patient to rest quietly day after surgery  - Patient can resume light active duty on post-operative day #2.  - Patient to maintain good oral hygiene with brushing of teeth 2x/day with soft toothbrush starting day after surgery.  - Patient to maintain full liquid to soft diet. Examples: pudding, jello, yogurt, ice cream, milkshakes, Boost, Ensure, mashed potatoes, soup, apple sauce, etc. until further specified by OMS at follow-up.  - While sleeping or resting, elevate your head on 2 pillows, it will help with swelling.   - Please call [phone] to ask for oral surgery resident on call if questions or concerns.      Follow-up appt:   - [facility] [place] [facility]  7th Floor, [place] [place] ([address] [facility]) - in 1 week(s) - please call to schedule time [phone] / [phone].             Sepsis 2/2 R Canine Space Abscess, Associated Facial Cellulitis and Sinusitis   S/p I&D of a right canine space abscess with placement of a penrose drain (x1) 6/24   Had root canal of R maxillary 1st molar on 6/20 as outpatient which led to above complication. Presented to ED with worsening facial pain and swelling, had leukocytosis and tachycardia at admission.   -Antibiotics (has multiple drug allergies)               -Levofloxacin 6/22 - continue at discharge              -Metronidazole 6/22 -  Continue at discharge              -Vancomycin 6/24 - 6/30.               -PO Doxy 100 mg BID  6/3- Continue at discharge  -OMFS consulted, appreciate assistance               -s/p I&D of a right canine space abscess with placement of a penrose drain (x1).              -Warm compress, avoid ice, HOB >30   -Dental team consulted, appreciate assistance               -Recommend 0.12% chlorhexidine glutamate mouthrinse BID  -Pain control: Scheduled acetaminophen, PRN oral oxycodone, PRN IV dilaudid, PRN toradol    -ID consulted, appreciate assistance      Staph Epi + Blood cultures 3/4   Blood cultures drawn 6/23 as a part of sepsis/DKA work-up. 6/24 returned positive for staph Epi (methicillin sensitive). Unclear if represents true bacteremia vs contamination.   -Continue vancomycin 6/24 -   -Follow up repeat blood cultures 6/24 - NGTD   -ID consulted, appreciate assistance               -Daily blood cultures till clearance      Left Third Toe Wound   Concern for Osteomyelitis   With h/o osteomyelitis of prior amputated toes. X-ray without obvious signs of osteo.   -Podiatry/Orthopedics consulted, appreciate assistance               6/29 updated recs noted  -WOCN Consulted               -Wound cares per their orders  -MRI Left forefoot + OM  -Vascular Surgery consulted--vascular blood flow intact,  and surgery per podiatry     Insulin Dependent DM2  Likely DKA 2/2 infection as above, improving DKA   Typically has a continuous glucose monitor and an insulin pump and per notes and per patient's pump she has about 1 unit of humalog per hour. Has had her pump off for about 12 hours prior to admission.   On 6/23 AM, labs showed DKA although pH on VBG was 7.37 (?). AGMA and glucose improved with insulin drip. Serum ketones still elevated, suspect 2/2 alternate cause (starvation ketosis?).   -Endocrine consulted, appreciate assistance   -6/23 - started insulin drip with DKA protocol in AM, with closing gap transitioned to non-DKA insulin drip in evening. Lantus given per endocrine recs.   -6/24 - continued on insulin drip + lantus per endocrine recs/assistance  -6/25 - discontinued insulin drip    - plan as per Endo, pt is aware:  When she resumes her Omnipod Dash, I created a new basal 3 profile that reflect her lower insulin needs currently.  Basal 1 was her PTA settings.  She can use basal 3 at discharge.  She is aware that Lantus given at bedtime and to use a temp basal of 0% until 23-24 hours since last lantus dose.      Basal 3 program:  Basal 0000 -0000: 0.7 units/hour  ISF: 45   Carb ratio: 1:15g CHO     Basal 1   00:00-11:29-->  1.05  11:30 am to 17:59-->  1.0  18:00 to 23:59-->  1.05  Total basal=24.85  ISF=1:45  Insulin to carb ratio: 1:15   Target Glucose=100-110  Total Gluc 4 hours    Consultations This Hospital Stay   NURSING TO CONSULT FOR VASCULAR ACCESS CARE IP CONSULT  DENTISTRY ADULT IP CONSULT  PODIATRY IP CONSULT  WOUND OSTOMY CONTINENCE NURSE  IP CONSULT  ORAL MAXILLOFACIAL SURGERY ADULT IP CONSULT  ENDOCRINE DIABETES ADULT IP CONSULT  NURSING TO CONSULT FOR VASCULAR ACCESS CARE IP CONSULT  NURSING TO CONSULT FOR VASCULAR ACCESS CARE IP CONSULT  NURSING TO CONSULT FOR VASCULAR ACCESS CARE IP CONSULT  PHARMACY TO DOSE VANCO  INFECTIOUS DISEASE GENERAL ADULT IP CONSULT  NURSING TO CONSULT FOR  VASCULAR ACCESS CARE IP CONSULT  VASCULAR SURGERY ADULT IP CONSULT  CARE MANAGEMENT / SOCIAL WORK IP CONSULT  NURSING TO CONSULT FOR VASCULAR ACCESS CARE IP CONSULT  NURSING TO CONSULT FOR VASCULAR ACCESS CARE IP CONSULT    Code Status   Full Code    Time Spent on this Encounter   I, Rush Crowell MD, personally saw the patient today and spent greater than 30 minutes discharging this patient.       Rush Crowell MD  [facility] [address] [place] [address]  Phone: [phone]  ______________________________________________________________________    Physical Exam   Vital Signs: Temp: 98  F (36.7  C) Temp src: Oral BP: (!) 162/61 Pulse: 69   Resp: 20 SpO2: 98 % O2 Device: None (Room air)    Weight: 275 lbs 4.8 oz    Alert, awake, baseline foot dressing in place and R cheek swelling noted       Primary Care Physician   Physician No Ref-Primary    Discharge Orders      Reason for your hospital stay    R Maxillary infection with actinomyces  Left foot OM with polymicrobial diabetic infection     Activity    Your activity upon discharge: activity as tolerated     Adult [facility] Follow-up and recommended labs and tests    ASAP with Podiatry upon return from [place] trip; Tentatively outpatient surgery on 7/18  Follow up upon return from [place] with ID team: for Antibiotics.  Follow up with OMFS as per their recommendations directly to you.      Appointments on [place] and/or [facility] (with [facility] or [facility] provider or service). Call [phone] if you haven't heard regarding these appointments within 7 days of discharge.     Discharge Instructions    When she resumes her Omnipod Dash, I created a new basal 3 profile that reflect her lower insulin needs currently.  Basal 1 was her PTA settings.  She can use basal 3 at discharge.  She is aware that Lantus given at bedtime and to use a temp basal of 0% until 23-24 hours since last lantus dose.      Basal 3 program:  Basal 0000 -0000: 0.7  units/hour  ISF: 45   Carb ratio: 1:15g CHO     Basal 1   00:00-11:29-->  1.05  11:30 am to 17:59-->  1.0  18:00 to 23:59-->  1.05  Total basal=24.85  ISF=1:45  Insulin to carb ratio: 1:15   Target Glucose=100-110  Total Gluc 4 hours    Outpatient follow up: will recommend outpatient follow up with [facility] endocrinology and CDE  Plan discussed with patient and patient's spouse.       Diet    Follow this diet upon discharge: Orders Placed This Encounter      Mechanical/Dental Soft Diet       Significant Results and Procedures   Results for orders placed or performed during the hospital encounter of 06/22/23   Foot XR, G/E 3 views, left    Narrative    EXAM: XR FOOT LEFT G/E 3 VIEWS  LOCATION: [facility]  DATE: 6/22/2023    INDICATION: Second and third toe redness, evaluate for osteomyelitis.  COMPARISON: None.      Impression    IMPRESSION: Postop previous amputation of the great toe and partial amputation at the second toe through the PIP joint of the second toe. Bony irregularity at the remaining first metatarsal head could represent osteomyelitis particularly if there is a   deep ulcer which probes to bone. No convincing radiographic evidence for acute osteomyelitis at the distal margin of the proximal phalanx second toe. Bony irregularity centered at the DIP joint of the third toe with slight widening of the joint space can   be seen with septic arthritis and osteomyelitis at the third toe DIP joint with adjacent soft tissue swelling. Correlate clinically. Scattered arthritic change left foot. No acute fracture or dislocation. Heel spur. Dorsal midfoot spurring with spurring   at the dorsum of the talar head.    NOTE: ABNORMAL REPORT    THE DICTATION ABOVE DESCRIBES AN ABNORMALITY FOR WHICH FOLLOW-UP IS NEEDED.    CT Facial Bones with Contrast    Narrative    CT FACIAL BONES WITH CONTRAST 6/22/2023 6:48 PM    History:  root canal 6/20.  Facial  swelling    Comparison:  None      Technique: Using thin collimation multidetector helical acquisition  technique, axial and coronal thin section CT images were reconstructed  through the facial bones. Images were reviewed in bone and soft tissue  windows.    Findings:    Reported dental procedure involving the third maxillary molar. The  third molar and lateral margins of the maxillary sinus without  evidence of dentigerous cyst, however, there is at least moderate  right maxillary sinus mucosal debris/thickening, with a milder degree  within the left maxillary sinus and scattered paranasal sinus mucosal  thickening. There is substantial right subcutaneous facial soft tissue  inflammatory change. Questionable small fluid collection along the  right hemimandible, image 21 of series 2. There is trace air foci near  the postsurgical region (series 2 image 19-20), also with somewhat  streaky hypodensities within the inflammatory change without discrete  fluid collection. There is somewhat limited evaluation secondary to  dental hardware streak artifact. Bilateral nonenlarged reactive  parotid, some and fibular, and submental lymph nodes, as well as  bilateral upper jugular lymph node seen.     No evident fracture of the facial bones. The cribriform plate appears  intact. Alignment of the facial bones appears normal.  The orbits are  normal. Mastoid air cells are unremarkable. Visualized portions of the  brain parenchyma demonstrate no definite abnormality.       Impression    Impression:  1. Extensive inflammatory change about the right maxillary and  mandibular subcutaneous soft tissues. Questionable small fluid  collection along the right hemimandible.  2. Reactive bilateral lymph nodes, noted above.  3. Right maxillary sinusitis without definite dentigerous cyst or  odontogenic origin.    I have personally reviewed the examination and initial interpretation  and I agree with the findings.    JUDY TAN MD          SYSTEM ID:  [id number]   MR Foot Left w/o & w Contrast    Narrative    MR left foot without and with contrast 6/28/2023 11:41 AM    History: evaluate for osteomyelitis    Techniques: Multiplanar multisequence imaging of the left foot was  obtained before and after administration of intravenous contrast.    Contrast: 10cc of Gadavist injected.    Comparison: 6/22/2023    Findings:    Bones    Postsurgical changes of the great toe amputation at the level of first  metatarsophalangeal joint. Mild edema like marrow signal intensity and  associated T1 hypointensity at the first metatarsal head, favors  postoperative change though possible osteomyelitis cannot be entirely  excluded. Overlying mild regional soft tissue enhancement.     Postsurgical change of second toe amputation at the level of the  proximal phalanx distally with underlying marrow signal abnormality  including T1 hypointensity, T2 hyperintensity and enhancement  extending down to the proximal shaft, consistent with osteomyelitis  until proven otherwise. Regional soft tissue enhancement, consistent  with cellulitis.     Extensive marrow signal alteration including T1 hypointensity and  enhancement involving entire third distal and middle phalanges,  consistent with osteomyelitis until proven otherwise. Regional soft  tissue enhancement, consistent with cellulitis. Apparent dorsal soft  tissue defect at the base of nail bed.    Marked T1 hypointensity with associated enhancement and mild edema  like marrow signal intensity of the fifth distal phalanx, highly  concerning for osteomyelitis. Regional soft tissue enhancement without  loculated fluid collection.    Degenerative changes of mid foot, likely related to Charcot  arthropathy.    Joints and periarticular soft tissue    Joint effusion: Mild second metatarsophalangeal joint synovitis.    Plantar plates: Plantar plates of the second through fifth toe at  metatarsophalangeal joints are grossly  intact.    Intermetatarsal spaces: No interdigital neuroma. Mild second and  possibly third intermetatarsal bursitis.    Ligaments and Tendons    Lisfranc interosseous ligament: Intact.    Tendons: Other than changes related to amputations, the visualized  courses of flexor and extensor tendons are intact. Small postoperative  fluid proximal to the mastoid mammogram every, incompletely  visualized.    Muscles    Diffuse severe fatty infiltration/atrophy of the visualized muscles,  consistent with microangiopathy/polyneuropathy.    ANCILLARY FINDINGS    No rim-enhancing fluid collection to indicate abscess.    Dorsal subcutaneous edema and enhancement particularly over the second  through fourth metatarsals, query cellulitis.    Plantar subcutaneous fat effacement partially visualized at the level  of the midfoot. Mild nodular thickening central cord of plantar  aponeurosis approximately at the level of the first tarsometatarsal  joint, query small plantar fibroma.      Impression    Impression:  1. Findings consistent with osteomyelitis of the second proximal  phalanx, third distal and middle phalanges until proven otherwise.  2. Findings highly concerning for osteomyelitis fifth distal phalanx.  3. Though thought less likely, possible residual osteomyelitis first  metatarsal head cannot be entirely excluded.  4. No abscess.  5. Plantar subcutaneous fat effacement partially visualized at the  level of the midfoot, maybe pressure callus.    Btiques         SYSTEM ID:  [id number]   US ALEX Doppler No Exercise    Narrative    ALEX, TBI, and 1st digit PPG 6/29/2023 2:40 PM    CLINICAL HISTORY: Peripheral arterial disease. Left 1st toe amputated.    COMPARISONS: Ultrasound lower extremity arterial duplex 6/29/2023.    REFERRING PROVIDER: SUSAN CUEVAS    TECHNIQUE: Bilateral ALEX, TBI, and 1st Digit PPG obtained.    FINDINGS:  RIGHT:       Brachial: 154 mmHg       Ankle (PT): 194 mmHg       Ankle (DP): 182 mmHg        1st Digit: 132 mmHg         ALEX: 1.26       TBI: 0.86         1st Digit PPG: Normal    LEFT:       Brachial: 154 mmHg       Ankle (PT): 160 mmHg       Ankle (DP): 177 mmHg       1st Digit: amputated         ALEX: 1.15       TBI: amputated         1st Digit PPG: amputated      Impression    IMPRESSION:  1. RIGHT:       A. Resting ALEX is normal, 1.26.       B. Resting TBI is normal, 0.86.    2. LEFT:       A. Resting ALEX is normal, 1.15.    Guidelines:    ALEX Diagnostic Criteria (Based on criteria published in Circulation  2011; 124: 9534-6738):    > 1.4: Non compressible    1.00 - 1.40: Normal    0.91 - 0.99: Borderline    At or below 0.90: Abnormal    ALEX Diagnostic Criteria (Based on ACC/AHA guideline 2008):    >/=1.3 - non compressible vessels    1.00  -1.29 - Normal    0.91 - 0.99 - Borderline    0.41 - 0.90 - Mild to moderate PAD    0.00 - 0.40 - Severe PAD    MICHAEL OHARA MD         SYSTEM ID:  [id number]    Lower Extremity Arterial Duplex Bilateral    Located within [facility]    ULTRASOUND LOWER EXTREMITY ARTERIAL DUPLEX BILATERAL 6/29/2023 2:20 PM    CLINICAL HISTORY: Peripheral arterial disease.     COMPARISONS: None available.    REFERRING PROVIDER: SUSAN CUEVAS    TECHNIQUE: Bilateral leg arteries evaluated with grayscale, color  Doppler, and spectral pulsed wave Doppler ultrasound.    FINDINGS:   RIGHT:  Common femoral artery: 145/0 cm/s, triphasic  Profundus femoral artery: 142/0 cm/s, triphasic    Superficial femoral artery, origin: 120/0 cm/s, triphasic  Superficial femoral artery, mid thigh: 171/0 cm/s, triphasic  Superficial femoral artery, distal thigh: 150/0 cm/s, triphasic    Popliteal artery: 105/0 cm/s, triphasic    Posterior tibial artery, ankle: 108/0 cm/s, triphasic  Anterior tibial artery, ankle: 195/0 cm/s, triphasic    LEFT:  Common femoral artery: 138/0 cm/s, triphasic  Profundus femoral artery: 60/0 cm/s, triphasic    Superficial femoral artery, origin: 133/0 cm/s, triphasic  Superficial femoral artery,  mid thigh: 149/0 cm/s, triphasic  Superficial femoral artery, distal thigh: 108/0 cm/s, triphasic    Popliteal artery: 94/0 cm/s, triphasic    Posterior tibial artery, ankle: 48/0 cm/s, triphasic  Anterior tibial artery, ankle: 207/15 cm/s, triphasic      Impression    IMPRESSION: Negative study.    MICHAEL OHARA MD         SYSTEM ID:  [id number]   CT Soft Tissue Neck w Contrast    Narrative    EXAM: CT SOFT TISSUE NECK W CONTRAST  6/30/2023 2:12 PM     HISTORY: right infraorbital induration s/p I&D of a right canine  space abscess. Evaluate for drainable fluid colleciton.         COMPARISON: Dental CT without contrast 6/22/2023     TECHNIQUE: Following intravenous administration of nonionic iodinated  contrast medium, thin section helical CT images were obtained from the  skull base down to the level of the aortic arch.  Axial, coronal and  sagittal reformations were performed with 2-3 mm slice thickness  reconstruction. Images were reviewed in soft tissue, lung and bone  windows.    CONTRAST: 100 mL [id number]    FINDINGS:   Streak artifact due to dental hardware. There is persistent soft  tissue swelling and edema over the right maxillary and mandibular  region which is somewhat improved compared with dental CT from  6/20/2023. No discrete fluid collection. No involvement of the orbit.  No focal nasopharyngeal, oropharyngeal, hypopharyngeal, or glottic  mucosal space abnormality. Normal tongue base. Salivary glands are  within normal limits.    Scattered bilateral prominent cervical lymph nodes, likely reactive.    Normal thyroid gland.    Patent cervical vasculature; no high grade arterial stenosis.    No suspicious osseus lesion. No high grade spinal canal stenosis.    Decreased mucosal thickening of the right maxillary sinus. The mastoid  air cells are clear. No periapical dental lucency. The imaged skull  base, intracranial and orbital structures are within normal limits.    No suspicious finding in the  visualized superior mediastinum/thorax.  Clear lung apices.      Impression    IMPRESSION: Subcutaneous inflammatory changes in the right maxillary  and mandibular soft tissues, somewhat improved compared with dental CT  from 6/22/2023. No discrete drainable fluid collection.    I have personally reviewed the examination and initial interpretation  and I agree with the findings.    LEXI BARAHONA MD         SYSTEM ID:  [id number]       Discharge Medications   Current Discharge Medication List      START taking these medications    Details   chlorhexidine (PERIDEX) 0.12 % solution Swish and spit 15 mLs in mouth 2 times daily for 7 days  Qty: 210 mL, Refills: 0    Associated Diagnoses: Facial cellulitis; Foot ulcer, left, with fat layer exposed (H)      doxycycline hyclate (VIBRAMYCIN) 100 MG capsule Take 1 capsule (100 mg) by mouth every 12 hours for 30 days  Qty: 60 capsule, Refills: 0    Associated Diagnoses: Facial cellulitis; Foot ulcer, left, with fat layer exposed (H)      famotidine (PEPCID) 20 MG tablet Take 1 tablet (20 mg) by mouth 2 times daily as needed (GERD or refractory nausea)  Qty: 20 tablet, Refills: 0    Associated Diagnoses: Facial cellulitis; Foot ulcer, left, with fat layer exposed (H)      levofloxacin (LEVAQUIN) 750 MG tablet Take 1 tablet (750 mg) by mouth daily for 30 days  Qty: 30 tablet, Refills: 0    Associated Diagnoses: Facial cellulitis; Foot ulcer, left, with fat layer exposed (H)      metroNIDAZOLE (FLAGYL) 500 MG tablet Take 1 tablet (500 mg) by mouth 3 times daily for 30 days  Qty: 90 tablet, Refills: 0    Associated Diagnoses: Facial cellulitis; Foot ulcer, left, with fat layer exposed (H)      ondansetron (ZOFRAN ODT) 4 MG ODT tab Take 1 tablet (4 mg) by mouth every 6 hours as needed for nausea or vomiting  Qty: 30 tablet, Refills: 1    Associated Diagnoses: Facial cellulitis; Foot ulcer, left, with fat layer exposed (H)         CONTINUE these medications which have NOT CHANGED     Details   Continuous Blood Gluc Sensor (FREESTYLE HIRAL 3 SENSOR) MISC       Insulin Disposable Pump (OMNIPOD DASH PODS, GEN 4,) MISC          STOP taking these medications       flurbiprofen (ANSAID) 100 MG tablet Comments:   Reason for Stopping:         Multiple Vitamin (MULTIVITAMIN ADULT PO) Comments:   Reason for Stopping:             Allergies   Allergies   Allergen Reactions     Cephalosporins      Other reaction(s): *Unknown     Piperacillin-Tazobactam In Dex Hives     Statins Muscle Pain (Myalgia)     Cephalexin Rash     Clindamycin Rash     possible rash, was on zosyn at the same time       Penicillins Hives and Rash

## 2023-06-30 NOTE — CONSULTS
Care Management Initial Consult    General Information  Assessment completed with:  Patient, Glenny. Spouse, Nick  Type of CM/SW Visit: Offer D/C Planning    Primary Care Provider verified and updated as needed:  Yes. Dr Simms at [facility]. She is retiring soon and pt will need to find a new primary.  Readmission within the last 30 days: no previous admission in last 30 days   Reason for Consult: discharge planning  Advance Care Planning:  Not addressed       Communication Assessment  Patient's communication style: spoken language (English or Bilingual)    Hearing Difficulty or Deaf: no   Wear Glasses or Blind: no    Cognitive  Cognitive/Neuro/Behavioral: WDL                      Living Environment:   People in home:  Glenny and Nick  Current living Arrangements: house. Has 3 steps to enter then everything is on 1 level. Bathroom has a tub/shower combination, no grab bars.     Able to return to prior arrangements: yes     Family/Social Support:  Care provided by: self  Provides care for:    Marital Status:     Nick       Description of Support System: Supportive       Current Resources:   Patient receiving home care services: No   Community Resources: None  Equipment currently used at home: none. Has 2 walkers and a cane but doesn't use them.   Supplies currently used at home:      Employment/Financial:  Employment Status:       Financial Concerns:         Does the patient's insurance plan have a 3 day qualifying hospital stay waiver?  No    Lifestyle & Psychosocial Needs:  Social Determinants of Health     Tobacco Use: Not on file   Alcohol Use: Not on file   Financial Resource Strain: Not on file   Food Insecurity: Not on file   Transportation Needs: Not on file   Physical Activity: Not on file   Stress: Not on file   Social Connections: Not on file   Intimate Partner Violence: Not on file   Depression: Not on file   Housing Stability: Not on file        Functional Status:  Prior to admission patient needed assistance:   Dependent ADLs:  Independent  Dependent IADLs:  Independent     Mental Health Status:   Denies any issues     Chemical Dependency Status:   Denies any issues         Values/Beliefs:  Spiritual, Cultural Beliefs, Yazidism Practices, Values that affect care:               Additional Information:  Pt presented to the ED with sepsis secondary to dental infection, sinusitis and right facial cellulitis. Left 3rd toe wound with history of osteomyelitis of prior amputated toes. Hx of Type 2 diabetes and home insulin pump.      Dr Crowell reported pt currently on IV Vanco, ID consulting. Possible pt may need longer term IV antibiotics after discharge. Seen by Podiatry. Vascular Surgery consulted.     Attempted to see pt x2, she was going to the bathroom and then being transported off the floor.   Referral sent to [facility] for IV antibiotics, requested they check pt's insurance coverage.    This afternoon I introduced myself to pt and  and explained I help with discharge planning. Pt alert, resting in bed, right face swollen. Pt reports she lives at home with her  in a house. Has 3 steps to enter and then everything is on 1 level. Pt was independent prior to admission. Was not receiving any home care services.   Pt said it seems like today she is hearing more about a plan. Informed her of the possibility she may need IV antibiotics after discharge, but this is not confirmed yet. Informed pt I requested [facility] check her insurance coverage (today is Thursday & will not be able to check insurance over the weekend). Informed pt home infusion provides the IV antibiotics and supplies.  requested we go thru [facility]. Informed him [facility] will check insurance and if they are not in-network we will refer pt to another home infusion agency.   Pt said Infectious Disease told her they could only get 3 days of IV antibiotics  at a time and will need lab work done. Pt &  are going on a trip to [place], [place], leaving on Friday, July 7th & returning Monday, July 17th. Informed them a PICC line is usually placed, dressing needs to be changed once a week. Informed pt [facility] is used to arranging lab work at other locations. Pt said the ID doctor doesn't want her to go on the trip to [place].    plans to buy a mini-fridge (he was going to get one anyway for the trip).   Will await insurance coverage from  [facility]. Will inform [facility] of pt's plans for trip to [place].   If pt does need IV antibiotics after discharge then [facility] and [facility] will work to coordinate this. Pt expressed understanding and in agreement.       Elisha Larsen, RN Care Coordinator  [facility]  On 6- [facility] coverage for [address]. Call [phone].       [facility] (IV antibiotics)   Phone: [phone]

## 2023-06-30 NOTE — PROGRESS NOTES
Assessment: Glenny is a 57-year-old type II diabetic on insulin pump with neuropathy and left foot hallux complete and second digit amputation that was partial.  She does have osteomyelitis in the distal aspect of the left third toe.  I did see her bedside and had a long discussion with her about possible amputation sites.  Vascular is cleared her.  The first type of amputation would be to just take the distal phalanx of the third metatarsal.  The complication for this would be that the fourth metatarsal would have pressure on it and would necrosis the distal tip.  The other option would be a transmetatarsal amputation.  She is not ready for this yet.  She would like to just have the distal aspect of the toe taken off.  However, she is adamant about going on her trip to [place] on Friday.  This surgery will not be able to be performed while she is in-house.  We will manage this as an outpatient.  I will put her back on the schedule to see me and Dr. Rockwell on July 18.  The toe does not appear to be acutely infected.  I am hoping that we can gain at least suppression until she comes back from her trip.    Plan:  -Patient seen and evaluated.  -I had a long discussion with her and her  at bedside along with Dr. Crowell.  She is adamant about going on her trip.  We will schedule her as an outpatient to have the distal aspect of the toe removed.  -Continue Betadine dressings to the toe.  -Appreciate ID's expertise in managing her antibiotics.  -Appreciate vascular input.  -Surgical planning will take place as an outpatient.  -From our perspective, she can be discharged home, when all services are comfortable with this.    Interval history: She was seen by vascular.  She does not want to have any IV if possible.  She is adamant about going on a trip to [place].    Objective:  Vital signs:  Temp: 98  F (36.7  C) Temp src: Oral BP: (!) 162/61 Pulse: 69   Resp: 20 SpO2: 98 % O2 Device: None (Room air)   Height: 177.8 cm (5'  "10\") Weight: 124.9 kg (275 lb 4.8 oz)  Estimated body mass index is 39.5 kg/m  as calculated from the following:    Height as of this encounter: 1.778 m (5' 10\").    Weight as of this encounter: 124.9 kg (275 lb 4.8 oz).      Lab Results   Component Value Date    WBC 5.2 06/28/2023     Lab Results   Component Value Date    RBC 4.08 06/28/2023     Lab Results   Component Value Date    HGB 12.5 06/28/2023     Lab Results   Component Value Date    HCT 37.7 06/28/2023     No components found for: MCT  Lab Results   Component Value Date    MCV 92 06/28/2023     Lab Results   Component Value Date    MCH 30.6 06/28/2023     Lab Results   Component Value Date    MCHC 33.2 06/28/2023     Lab Results   Component Value Date    RDW 12.0 06/28/2023     Lab Results   Component Value Date     06/28/2023     Last Comprehensive Metabolic Panel:  Lab Results   Component Value Date     06/30/2023    POTASSIUM 3.6 06/30/2023    CHLORIDE 109 (H) 06/30/2023    CO2 23 06/30/2023    ANIONGAP 13 06/30/2023     (H) 06/30/2023    BUN 5.9 (L) 06/30/2023    CR 0.49 (L) 06/30/2023    GFRESTIMATED >90 06/30/2023    JOSE CARLOS 8.7 06/30/2023       Physical exam unchanged since yesterday.  "

## 2023-06-30 NOTE — PLAN OF CARE
Goal Outcome Evaluation:      Plan of Care Reviewed With: patient          Outcome Evaluation: Pt VSS on RA.  R facial pain managed with heat.  Declined oral medication.   Pt also utilizing essential oils. Pt has new rash in perineum, per pt she has had fungal rashes that felt the same. Pt did not want to request any antifungal creams and applied coconut oil to elba area instead. Pt. up independently in room.  Dressing to L foot changed per POC.  Next change due tomorrow. PIV infusing IV vancomyocin.  Pt also on oral abx.   Pt tolerated PO intake with good appetite this evening. Per vascular note, podiatry to evaluate again for appropriate level of amputation of  toes LLE.

## 2023-06-30 NOTE — PROGRESS NOTES
[facility]    Medicine Progress Note - Hospitalist Service, GOLD TEAM 10    Date of Admission:  6/22/2023    Assessment & Plan   56 yo female with history of DM2 on insulin pump complicated by retinopathy, peripheral neuropathy, and prior left toe amputations and chronic left foot wound, obesity, HLD admitted for sepsis 2/2 dental infection and R facial cellulitis. Hospital course complicated by DKA 2/2 infection. Concern for L foot osteomyelitis.     Today:  - Pending OMFS to revisit patient for Possible suture removal  - Per Podiatry: Start Betadine dressings to the toe daily.  Do not keep this open to air.   -Outpatient Follow up with Podiatry, ID, Endocrine  -Continue PO LQ daily, PO flagyl 500 TID, start PO Doxy 100 mg BID   > Stop IV Vanco, as pt refusal IV access, and will travel out of state   No picc line  -6/30: Facial CT completed, final OMFS recs and bedside eval with patient--PENDING  - Meds to DC pharmacy  - Final ENDO recs communicated to patient for discharge    Sepsis 2/2 R Canine Space Abscess, Associated Facial Cellulitis and Sinusitis   S/p I&D of a right canine space abscess with placement of a penrose drain (x1) 6/24   Had root canal of R maxillary 1st molar on 6/20 as outpatient which led to above complication. Presented to ED with worsening facial pain and swelling, had leukocytosis and tachycardia at admission.   -Antibiotics (has multiple drug allergies)    -Levofloxacin 6/22 - continue at discharge   -Metronidazole 6/22 -  Continue at discharge   -Vancomycin 6/24 - 6/30.    -PO Doxy 100 mg BID  6/3- Continue at discharge  -OMFS consulted, appreciate assistance    -s/p I&D of a right canine space abscess with placement of a penrose drain (x1).   -Warm compress, avoid ice, HOB >30   -Dental team consulted, appreciate assistance    -Recommend 0.12% chlorhexidine glutamate mouthrinse BID  -Pain control: Scheduled acetaminophen, PRN oral oxycodone,  PRN IV dilaudid, PRN toradol    -ID consulted, appreciate assistance     Staph Epi + Blood cultures 3/4   Blood cultures drawn 6/23 as a part of sepsis/DKA work-up. 6/24 returned positive for staph Epi (methicillin sensitive). Unclear if represents true bacteremia vs contamination.   -Continue vancomycin 6/24 -   -Follow up repeat blood cultures 6/24 - NGTD   -ID consulted, appreciate assistance    -Daily blood cultures till clearance     Left Third Toe Wound   Concern for Osteomyelitis   With h/o osteomyelitis of prior amputated toes. X-ray without obvious signs of osteo.   -Podiatry/Orthopedics consulted, appreciate assistance    6/29 updated recs noted  -WOCN Consulted    -Wound cares per their orders  -MRI Left forefoot + OM  -Vascular Surgery consulted--vascular blood flow intact, and surgery per podiatry    Insulin Dependent DM2  Likely DKA 2/2 infection as above, improving DKA   Typically has a continuous glucose monitor and an insulin pump and per notes and per patient's pump she has about 1 unit of humalog per hour. Has had her pump off for about 12 hours prior to admission.   On 6/23 AM, labs showed DKA although pH on VBG was 7.37 (?). AGMA and glucose improved with insulin drip. Serum ketones still elevated, suspect 2/2 alternate cause (starvation ketosis?).   -Endocrine consulted, appreciate assistance   -6/23 - started insulin drip with DKA protocol in AM, with closing gap transitioned to non-DKA insulin drip in evening. Lantus given per endocrine recs.   -6/24 - continued on insulin drip + lantus per endocrine recs/assistance  -6/25 - discontinued insulin drip    - plan as per Endo, pt is aware:  When she resumes her Omnipod Dash, I created a new basal 3 profile that reflect her lower insulin needs currently.  Basal 1 was her PTA settings.  She can use basal 3 at discharge.  She is aware that Lantus given at bedtime and to use a temp basal of 0% until 23-24 hours since last lantus dose.      Basal 3  "program:  Basal 0000 -0000: 0.7 units/hour  ISF: 45   Carb ratio: 1:15g CHO     Basal 1   00:00-11:29-->  1.05  11:30 am to 17:59-->  1.0  18:00 to 23:59-->  1.05  Total basal=24.85  ISF=1:45  Insulin to carb ratio: 1:15   Target Glucose=100-110  Total Gluc 4 hours       Diet: Mechanical/Dental Soft Diet  Diet    DVT Prophylaxis: Pneumatic Compression Devices and Ambulate every shift  Brenner Catheter: Not present  Lines: None     Cardiac Monitoring: None  Code Status: Full Code      Clinically Significant Risk Factors              # Hypoalbuminemia: Lowest albumin = 3.2 g/dL at 6/25/2023  7:56 AM, will monitor as appropriate           # DMII: A1C = 6.8 % (Ref range: <5.7 %) within past 6 months   # Obesity: Estimated body mass index is 39.5 kg/m  as calculated from the following:    Height as of this encounter: 1.778 m (5' 10\").    Weight as of this encounter: 124.9 kg (275 lb 4.8 oz).           Disposition Plan     Expected Discharge Date: 06/30/2023      Destination: home  Discharge Comments: Pending antibiotics plan, may need to discharge home with Iv antibiotics pending ID Recs          Rush Crowell MD  Hospitalist Service, [address]  Securely message with Schedulicity (more info)  Text page via [facility] Paging/Directory   See signed in provider for up to date coverage information  ______________________________________________________________________    Interval History   No overnight events reported.    Discussed with pt and  at bedside.  Briefly with OMFS  Discussed with podiatry  Pain controlled    No present complaints, wants to formulate a DC plan    Physical Exam   Vital Signs: Temp: 98  F (36.7  C) Temp src: Oral BP: (!) 162/61 Pulse: 69   Resp: 20 SpO2: 98 % O2 Device: None (Room air)    Weight: 275 lbs 4.8 oz    Constitutional: no apparent distress, pleasant and cooperative   Cardiovascular: regular rate and rhythm, normal S1 and " S2  Respiratory: clear to auscultation bilaterally, no wheezing, rales, or rhonchi, normal work of breathing   GI: abdomen soft, non tender, non distended, bowel sounds present   Neuro: alert and oriented, no gross focal deficits noted   Skin: Right cheek with are that is firm and erythematous, overall improved erythema and swelling from prior     Medical Decision Making       52 MINUTES SPENT BY ME on the date of service doing chart review, history, exam, documentation & further activities per the note.  MANAGEMENT DISCUSSED with the following over the past 24 hours: antibitotic plan, contacting OMFS if they don't stop by, updating the RN, awaiting MRI.        Data     I have personally reviewed the following data over the past 24 hrs:    N/A  \   N/A   / N/A     145 109 (H) 5.9 (L) /  116 (H)   3.6 23 0.49 (L) \       Imaging results reviewed over the past 24 hrs:   Recent Results (from the past 24 hour(s))   CT Soft Tissue Neck w Contrast    Narrative    EXAM: CT SOFT TISSUE NECK W CONTRAST  6/30/2023 2:12 PM     HISTORY: right infraorbital induration s/p I&D of a right canine  space abscess. Evaluate for drainable fluid colleciton.         COMPARISON: Dental CT without contrast 6/22/2023     TECHNIQUE: Following intravenous administration of nonionic iodinated  contrast medium, thin section helical CT images were obtained from the  skull base down to the level of the aortic arch.  Axial, coronal and  sagittal reformations were performed with 2-3 mm slice thickness  reconstruction. Images were reviewed in soft tissue, lung and bone  windows.    CONTRAST: 100 mL [id number]    FINDINGS:   Streak artifact due to dental hardware. There is persistent soft  tissue swelling and edema over the right maxillary and mandibular  region which is somewhat improved compared with dental CT from  6/20/2023. No discrete fluid collection. No involvement of the orbit.  No focal nasopharyngeal, oropharyngeal, hypopharyngeal, or  glottic  mucosal space abnormality. Normal tongue base. Salivary glands are  within normal limits.    Scattered bilateral prominent cervical lymph nodes, likely reactive.    Normal thyroid gland.    Patent cervical vasculature; no high grade arterial stenosis.    No suspicious osseus lesion. No high grade spinal canal stenosis.    Decreased mucosal thickening of the right maxillary sinus. The mastoid  air cells are clear. No periapical dental lucency. The imaged skull  base, intracranial and orbital structures are within normal limits.    No suspicious finding in the visualized superior mediastinum/thorax.  Clear lung apices.      Impression    IMPRESSION: Subcutaneous inflammatory changes in the right maxillary  and mandibular soft tissues, somewhat improved compared with dental CT  from 6/22/2023. No discrete drainable fluid collection.    I have personally reviewed the examination and initial interpretation  and I agree with the findings.    LEXI BARAHONA MD         SYSTEM ID:  [id number]

## 2023-06-30 NOTE — PHARMACY-VANCOMYCIN DOSING SERVICE
Pharmacy Vancomycin Note  Date of Service 2023  Patient's  1965   57 year old, female    Indication: Bacteremia, Dental Infection, SSTI  Day of Therapy: 7  Current vancomycin regimen:  2000 mg IV q12h  Current vancomycin monitoring method: AUC  Current vancomycin therapeutic monitoring goal: 400-600 mg*h/L    InsightRX Prediction of Current Vancomycin Regimen  Regimen: 2000 mg IV every 12 hours.  Start time: 09:39 on 2023  Exposure target: AUC24 (range)400-600 mg/L.hr   AUC24,ss: 493 mg/L.hr  Probability of AUC24 > 400: 90 %  Ctrough,ss: 11.7 mg/L  Probability of Ctrough,ss > 20: 0 %  Probability of nephrotoxicity (Lodise COLTEN ): 7 %    Current estimated CrCl = Estimated Creatinine Clearance: 186.6 mL/min (A) (based on SCr of 0.49 mg/dL (L)).    Creatinine for last 3 days  2023:  8:15 AM Creatinine 0.51 mg/dL  2023:  6:39 AM Creatinine 0.50 mg/dL  2023:  8:33 AM Creatinine 0.49 mg/dL    Recent Vancomycin Levels (past 3 days)  2023:  8:15 AM Vancomycin <4.0 ug/mL  2023:  8:33 AM Vancomycin 11.7 ug/mL    Vancomycin IV Administrations (past 72 hours)                   vancomycin (VANCOCIN) 2,000 mg in sodium chloride 0.9 % 500 mL intermittent infusion (mg) 2,000 mg New Bag 23     2,000 mg New Bag  0811     2,000 mg New Bag 23 1933     2,000 mg New Bag  1305                Nephrotoxins and other renal medications (From now, onward)    Start     Dose/Rate Route Frequency Ordered Stop    23 1300  vancomycin (VANCOCIN) 2,000 mg in sodium chloride 0.9 % 500 mL intermittent infusion         2,000 mg  over 120 Minutes Intravenous EVERY 12 HOURS 23 1157               Contrast Orders - past 72 hours (72h ago, onward)    Start     Dose/Rate Route Frequency Stop    23 1130  gadobutrol (GADAVIST) injection 10 mL         10 mL Intravenous ONCE 23 1139    06/28/23 1030  gadobutrol (GADAVIST) injection 10 mL         10 mL Intravenous ONCE  06/28/23 1102          Interpretation of levels and current regimen:  Vancomycin level is reflective of -600    Has serum creatinine changed greater than 50% in last 72 hours: No    Urine output:  unable to determine    Renal Function: Stable    Plan:  1. Continue Current Dose  2. Vancomycin monitoring method: AUC  3. Vancomycin therapeutic monitoring goal: 400-600 mg*h/L  4. Pharmacy will check vancomycin levels as appropriate in 3-5 Days.  5. Serum creatinine levels will be ordered daily for the first week of therapy and at least twice weekly for subsequent weeks.    Layton Valle

## 2023-06-30 NOTE — PROGRESS NOTES
"Paged to place PIV but patient refused stating \"I want to see infectious disease first\". Also stated \"I'm ready to walk out of this place\".  No PIV placed Rn made aware.  "

## 2023-06-30 NOTE — PROGRESS NOTES
"CLINICAL NUTRITION SERVICES - ASSESSMENT NOTE     Nutrition Prescription    RECOMMENDATIONS FOR MDs/PROVIDERS TO ORDER:  Mechanical soft diet as tolerated     Malnutrition Status:    Patient does not meet two of the established criteria necessary for diagnosing malnutrition but is at risk for malnutrition    Recommendations already ordered by Registered Dietitian (RD):  None today    Future/Additional Recommendations:  PO adequacy       REASON FOR ASSESSMENT  Glenny Campos is a/an 57 year old female assessed by the dietitian for LOS X 8    Chart reviewed:  History of DM2 on insulin pump complicated by retinopathy, peripheral neuropathy, and prior left toe amputations and chronic left foot wound, obesity, HLD     Admitted for sepsis 2/2 dental infection and R facial cellulitis. Hospital course complicated by DKA 2/2 infection. Concern for L foot osteomyelitis.     - Sepsis 2/2 R Canine Space Abscess, Associated Facial Cellulitis and Sinusitis   S/p I&D of a right canine space abscess with placement of a penrose drain (x1) 6/24   Started on antibiotic regimen    - Left Third Toe Wound, Concern for Osteomyelitis   MRI left foot: + OM     NUTRITION HISTORY  Met with patient today. Spouse at bedside. Patient notes a reduce oral intake due to facial pain. Tries to eat small meals such as hard boiled egg, yogurt, cottage cheese, canned fruit. Patient was able to eat some roast turkey with mashed potatoes for dinner last night. Able to tolerate intake. Denies need for oral nutrition supplements and or snacks at this time.     Encouraged patient to continue with high protein intake for better healing support.     CURRENT NUTRITION ORDERS  Diet: Mechanical/Dental Soft   Intake/Tolerance: Good appetite, consuming % of meals     LABS  CRP: 316 (H), WBC: 5.2   A1C: 6.8 (H), Glucose: 93   BUN: 5.9 (acute low), Cr: 0.49(L), GFR: >90    MEDICATIONS  Medications reviewed    ANTHROPOMETRICS  Height: 177.8 cm (5' 10\")  Most " Recent Weight:124.9 kg (275 lb 4.8 oz) standing wt today 6/30  IBW: 68.2 kg ( 183% IBW)  BMI: 39.50 kg /m2 Obesity Grade II BMI 35-39.9  Weight History:   Wt Readings from Last 10 Encounters:   06/30/23 124.9 kg (275 lb 4.8 oz)       Dosing Weight: 82 kg adjusted wt from standing wt of 124.9 kg and IBW of 68.2 kg     ASSESSED NUTRITION NEEDS  Estimated Energy Needs: 1640 +  kcals/day (20 + kcals/kg)  Justification: Maintenance and Obese  Estimated Protein Needs: 98 +  grams protein/day (1.2 +  grams of pro/kg)  Justification: Repletion  Estimated Fluid Needs: (1 mL/kcal)   Justification: Maintenance    PHYSICAL FINDINGS  See malnutrition section below.    MALNUTRITION  % Intake: < 75% for > 7 days (moderate)  % Weight Loss: None noted  Subcutaneous Fat Loss: None observed  Muscle Loss: None observed  Fluid Accumulation/Edema: None noted  Malnutrition Diagnosis: Patient does not meet two of the established criteria necessary for diagnosing malnutrition but is at risk for malnutrition    NUTRITION DIAGNOSIS  Inadequate oral intake related to chewing difficulties associated with facial infection as evidenced by patient's report    INTERVENTIONS  Implementation  Nutrition Education: reviewed high protein snacks and supplement availabilities and encouraged intake to optimize healing status -> patient is not interested       Goals  Patient to consume % of nutritionally adequate meal trays TID, or the equivalent with supplements/snacks.     Monitoring/Evaluation  Progress toward goals will be monitored and evaluated per protocol.      Romain Dunbar RD/PRASAD  5A ([phone])/5B RD pager: [phone]  Weekend/Holiday RD pager: [phone]

## 2023-06-30 NOTE — PLAN OF CARE
"Goal Outcome Evaluation:      Plan of Care Reviewed With: patient    Overall Patient Progress: improving    Outcome Evaluation: VSS on room air. Verbalizes pain is \"the usual\" on the right side of her face. Denies need for scheduled tylenol. MD paged about reports of vaginal burning however patient is declining interventions at this time. Betadine soaked gauze in place to left foot to be changed tomorrow. PIV saline locked. Independent to bathroom. LBM reported 6/29. Pt is able to make needs known. Continue with POC. Pending discharge plan.      "

## 2023-06-30 NOTE — PROGRESS NOTES
..  ORAL & MAXILLOFACIAL SURGERY   PROGRESS NOTE  Glenny Campos,  MRN: [id number],  : 1965           ASSESSMENT:  57 year old female with pmh significant for T2DM (found to be in DKA on admission), obesity, HLD, biliary colic presents with sepsis 2/2 to a right canine space abscess and worsening facial cellulitis and sinusitis following a dental procedure (likley apicoectomy per patient description) completed by an endodontist on  of her right maxillary 1st molar. She is now POD6 s/p I&D of a right canine space abscess with placement of a penrose drain (x1).    PLAN:  1) Removed penrose drain - Along will all remaining sutures.  2) Reviewed post operative instructions and prescriptions at length with the patient  3) Diet- mechanical soft diet, advance as tolerated  4) Activity- light activity, advance as tolerated; avoid heavy lifting for at least 7-14 days  5) HOB >30; warm compresses to right face (avoid ice)  6) Patient to f/u outpatient on Wednesday (2023) at 0800, clinic to coordinate appointment (address below)  7) Patient is stable from OMFS standpoint for discharge    [facility] - [facility]  [address] [place] [address]  Clinic phone number: [phone]  Clinic fax number: [phone]     Discussed with chief resident    Juany Gutierrez, ASHLY  Oral & Maxillofacial Surgery, Intern    Please page the on-call OMFS resident with any questions or concerns    ____________________________________      SUBJECTIVE:  Patient has been ambulatory and states she is feeling much better pain has subsided tremendously a 1/10. Patient denies any purulent drainage or foul taste in her mouth. Continues to deny odynophagia, dysphagia, dyspnea, or dysphonia.     PHYSICAL EXAM:   Vitals:    23 2153 23 0555 23 1139 23 1436   BP: (!) 146/64 119/47  (!) 162/61   BP Location: Left arm  Right arm  Right arm   Pulse: 75 62  69   Resp: 20 14  20   Temp: 98.3  F (36.8  C) 98.6  F (37  C)  98  F (36.7  C)   TempSrc: Oral Oral  Oral   SpO2: 94% 95%  98%   Weight:   124.9 kg (275 lb 4.8 oz)    Height:               GEN: WD/WN female, NAD  HEENT: NC/AT, EOMI, PERRL, large indurated swelling of the right infraorbital area, inferior border of the mandible is palpable bilaterally, no submandibular or submental swelling, fluctuant swelling along the right maxillary vestibule, no vestibular swelling of the mandible, FOM is soft and nontender, uvula is midline, CAROL 35mm spontaneously, can be manipulated to 40mm under distress, uvula is midline, no purulent drainage. CT scan was taken, see radiographic interpretation below.  CV: RRR, no M/G/R, warm and well-perfused  PULM: CTAB, breathing comfortably on room air  GI: Soft, ND/NT  MSK: SHUKLA, no peripheral extremity edema  NEURO: AAOx4, CN II-XII intact bilaterally  PSYCH: Appropriate mood and affect     LABS:   CBC RESULTS:   Recent Labs   Lab Test 06/28/23  0815   WBC 5.2   RBC 4.08   HGB 12.5   HCT 37.7   MCV 92   MCH 30.6   MCHC 33.2   RDW 12.0         Last Comprehensive Metabolic Panel:  Sodium   Date Value Ref Range Status   06/30/2023 145 136 - 145 mmol/L Final     Potassium   Date Value Ref Range Status   06/30/2023 3.6 3.4 - 5.3 mmol/L Final     Chloride   Date Value Ref Range Status   06/30/2023 109 (H) 98 - 107 mmol/L Final     Carbon Dioxide (CO2)   Date Value Ref Range Status   06/30/2023 23 22 - 29 mmol/L Final     Anion Gap   Date Value Ref Range Status   06/30/2023 13 7 - 15 mmol/L Final     GLUCOSE BY METER POCT   Date Value Ref Range Status   06/30/2023 89 70 - 99 mg/dL Final     Urea Nitrogen   Date Value Ref Range Status   06/30/2023 5.9 (L) 6.0 - 20.0 mg/dL Final     Creatinine   Date Value Ref Range Status   06/30/2023 0.49 (L) 0.51 - 0.95 mg/dL Final     GFR Estimate   Date Value Ref Range Status   06/30/2023 >90 >60 mL/min/1.73m2  Final     Calcium   Date Value Ref Range Status   06/30/2023 8.7 8.6 - 10.0 mg/dL Final        RADIOLOGY:  Streak artifact due to dental hardware. There is persistent soft  tissue swelling and edema over the right maxillary and mandibular  region which is somewhat improved compared with dental CT from  6/20/2023. No discrete fluid collection. No involvement of the orbit.  No focal nasopharyngeal, oropharyngeal, hypopharyngeal, or glottic  mucosal space abnormality. Normal tongue base. Salivary glands are  within normal limits.

## 2023-07-01 VITALS
TEMPERATURE: 98.1 F | OXYGEN SATURATION: 96 % | SYSTOLIC BLOOD PRESSURE: 136 MMHG | BODY MASS INDEX: 39.41 KG/M2 | HEART RATE: 67 BPM | RESPIRATION RATE: 18 BRPM | HEIGHT: 70 IN | DIASTOLIC BLOOD PRESSURE: 60 MMHG | WEIGHT: 275.3 LBS

## 2023-07-01 LAB
ANION GAP SERPL CALCULATED.3IONS-SCNC: 14 MMOL/L (ref 7–15)
BACTERIA ABSC ANAEROBE+AEROBE CULT: ABNORMAL
BACTERIA BLD CULT: NO GROWTH
BUN SERPL-MCNC: 6.5 MG/DL (ref 6–20)
CALCIUM SERPL-MCNC: 9 MG/DL (ref 8.6–10)
CHLORIDE SERPL-SCNC: 111 MMOL/L (ref 98–107)
CREAT SERPL-MCNC: 0.56 MG/DL (ref 0.51–0.95)
DEPRECATED HCO3 PLAS-SCNC: 24 MMOL/L (ref 22–29)
GFR SERPL CREATININE-BSD FRML MDRD: >90 ML/MIN/1.73M2
GLUCOSE BLDC GLUCOMTR-MCNC: 82 MG/DL (ref 70–99)
GLUCOSE SERPL-MCNC: 83 MG/DL (ref 70–99)
HOLD SPECIMEN: NORMAL
PHOSPHATE SERPL-MCNC: 3.9 MG/DL (ref 2.5–4.5)
POTASSIUM SERPL-SCNC: 3.4 MMOL/L (ref 3.4–5.3)
SODIUM SERPL-SCNC: 149 MMOL/L (ref 136–145)

## 2023-07-01 PROCEDURE — 250N000013 HC RX MED GY IP 250 OP 250 PS 637: Performed by: HOSPITALIST

## 2023-07-01 PROCEDURE — 36415 COLL VENOUS BLD VENIPUNCTURE: CPT | Performed by: HOSPITALIST

## 2023-07-01 PROCEDURE — 80048 BASIC METABOLIC PNL TOTAL CA: CPT | Performed by: HOSPITALIST

## 2023-07-01 PROCEDURE — 99239 HOSP IP/OBS DSCHRG MGMT >30: CPT | Performed by: HOSPITALIST

## 2023-07-01 PROCEDURE — 84100 ASSAY OF PHOSPHORUS: CPT | Performed by: HOSPITALIST

## 2023-07-01 PROCEDURE — 250N000013 HC RX MED GY IP 250 OP 250 PS 637

## 2023-07-01 RX ORDER — POTASSIUM CHLORIDE 750 MG/1
40 TABLET, EXTENDED RELEASE ORAL ONCE
Status: COMPLETED | OUTPATIENT
Start: 2023-07-01 | End: 2023-07-01

## 2023-07-01 RX ADMIN — METRONIDAZOLE 500 MG: 500 TABLET ORAL at 09:09

## 2023-07-01 RX ADMIN — CHLORHEXIDINE GLUCONATE 15 ML: 1.2 SOLUTION ORAL at 09:09

## 2023-07-01 RX ADMIN — POTASSIUM CHLORIDE 40 MEQ: 750 TABLET, EXTENDED RELEASE ORAL at 09:09

## 2023-07-01 RX ADMIN — DOXYCYCLINE HYCLATE 100 MG: 100 CAPSULE ORAL at 09:09

## 2023-07-01 ASSESSMENT — ACTIVITIES OF DAILY LIVING (ADL)
ADLS_ACUITY_SCORE: 24

## 2023-07-01 NOTE — PLAN OF CARE
Goal Outcome Evaluation:      Plan of Care Reviewed With: patient    Overall Patient Progress: improvingOverall Patient Progress: improving    Outcome Evaluation: A&O x4, VSS on RA. R facial pain managed with heat and essential oils, pt declining tylenol. Up independently in room. L toe dressing CDI, change prior to discharge. Home insulin pump on, BGs stable overnight. Plan for discharge home tomorrow and follow up outpatient after trip to [place].

## 2023-07-01 NOTE — PLAN OF CARE
Goal Outcome Evaluation:      Plan of Care Reviewed With: patient    Overall Patient Progress: improving    Outcome Evaluation: Pt discharged to home today with .  Condition at time of discharge was stable.  Discharge instructions reviewed with pt and all questions answered.  Meds picked up from discharge pharmacy.  All personal belongings sent with pt.

## 2023-07-01 NOTE — PROGRESS NOTES
..  ORAL & MAXILLOFACIAL SURGERY   PROGRESS NOTE  Glenny Campos,  MRN: [id number],  : 1965      ASSESSMENT:  57 year old female with pmh significant for T2DM (found to be in DKA on admission), obesity, HLD, biliary colic presents with sepsis 2/2 to a right canine space abscess and worsening facial cellulitis and sinusitis following a dental procedure (likley apicoectomy per patient description) completed by an endodontist on  of her right maxillary 1st molar. She is now POD7 s/p I&D of a right canine space abscess with removal of a penrose drain (x1).    PLAN:  1) Removed penrose drain - Along will all remaining sutures.  2) Reviewed post operative instructions and prescriptions at length with the patient  3) Diet- mechanical soft diet, advance as tolerated  4) Activity- light activity, advance as tolerated; avoid heavy lifting for at least 7-14 days  5) HOB >30; warm compresses to right face (avoid ice)  6) Patient to f/u outpatient on Wednesday (2023) at 0800, clinic to coordinate appointment (address below)  7) Patient is stable from FS standpoint for discharge    Discharge Instructions:  - No lifting greater than 15 lbs, no driving while using narcotic pain medication, no strenuous exercise for 2 weeks.   - Patient to rest quietly day after surgery  - Patient can resume light active duty on post-operative day #2.  - Patient to maintain good oral hygiene with brushing of teeth 2x/day with soft toothbrush starting day after surgery.  - Patient to maintain full liquid to soft diet. Examples: pudding, jello, yogurt, ice cream, milkshakes, Boost, Ensure, mashed potatoes, soup, apple sauce, etc. until further specified by OMS at follow-up.  - While sleeping or resting, elevate your head on 2 pillows, it will help with swelling.   - Please call [phone] to ask for oral surgery resident on call if questions or concerns.     Follow-up appt:   - [facility] 7th Floor, [place] [place] [address]  [facility] - in 1 week(s) - please call to schedule time [phone] / [phone].    Discussed with chief resident    Juany Gutierrez, ASHLY  Oral & Maxillofacial Surgery, Intern    Please contact the [facility] resident on-call with questions or concerns.  ____________________________________      SUBJECTIVE:  Patient has been ambulatory and states she is feeling much better pain has subsided tremendously a 1/10. Patient denies any purulent drainage or foul taste in her mouth. Continues to deny odynophagia, dysphagia, dyspnea, or dysphonia.      PHYSICAL EXAM:   Vitals:    06/30/23 1139 06/30/23 1436 06/30/23 2201 07/01/23 0534   BP:  (!) 162/61 (!) 149/64 136/60   BP Location:  Right arm Left arm Left arm   Pulse:  69 70 67   Resp:  20 20 18   Temp:  98  F (36.7  C) 98.6  F (37  C) 98.1  F (36.7  C)   TempSrc:  Oral Oral Oral   SpO2:  98% 98% 96%   Weight: 124.9 kg (275 lb 4.8 oz)      Height:            GEN: WD/WN female, NAD  HEENT: NC/AT, EOMI, PERRL, large indurated swelling of the right infraorbital area, inferior border of the mandible is palpable bilaterally, no submandibular or submental swelling, fluctuant swelling along the right maxillary vestibule, no vestibular swelling of the mandible, FOM is soft and nontender, uvula is midline, CAROL 35mm spontaneously, can be manipulated to 40mm under distress, uvula is midline, no purulent drainage. CT scan was taken, see radiographic interpretation below.  CV: RRR, no M/G/R, warm and well-perfused  PULM: CTAB, breathing comfortably on room air  GI: Soft, ND/NT  MSK: SHUKLA, no peripheral extremity edema  NEURO: AAOx4, CN II-XII intact bilaterally  PSYCH: Appropriate mood and affect     LABS:   CBC RESULTS:   Recent Labs   Lab Test 06/28/23  0815   WBC 5.2   RBC 4.08   HGB 12.5   HCT 37.7   MCV 92   MCH 30.6   MCHC 33.2   RDW 12.0         Last Comprehensive Metabolic Panel:  Sodium   Date Value Ref Range Status   07/01/2023 149 (H) 136 - 145 mmol/L Final      Potassium   Date Value Ref Range Status   07/01/2023 3.4 3.4 - 5.3 mmol/L Final     Chloride   Date Value Ref Range Status   07/01/2023 111 (H) 98 - 107 mmol/L Final     Carbon Dioxide (CO2)   Date Value Ref Range Status   07/01/2023 24 22 - 29 mmol/L Final     Anion Gap   Date Value Ref Range Status   07/01/2023 14 7 - 15 mmol/L Final     Glucose   Date Value Ref Range Status   07/01/2023 83 70 - 99 mg/dL Final     GLUCOSE BY METER POCT   Date Value Ref Range Status   07/01/2023 82 70 - 99 mg/dL Final     Urea Nitrogen   Date Value Ref Range Status   07/01/2023 6.5 6.0 - 20.0 mg/dL Final     Creatinine   Date Value Ref Range Status   07/01/2023 0.56 0.51 - 0.95 mg/dL Final     GFR Estimate   Date Value Ref Range Status   07/01/2023 >90 >60 mL/min/1.73m2 Final     Calcium   Date Value Ref Range Status   07/01/2023 9.0 8.6 - 10.0 mg/dL Final        RADIOLOGY:  Streak artifact due to dental hardware. There is persistent soft  tissue swelling and edema over the right maxillary and mandibular  region which is somewhat improved compared with dental CT from  6/20/2023. No discrete fluid collection. No involvement of the orbit.  No focal nasopharyngeal, oropharyngeal, hypopharyngeal, or glottic  mucosal space abnormality. Normal tongue base. Salivary glands are  within normal limits.

## 2023-07-01 NOTE — PLAN OF CARE
Goal Outcome Evaluation:      Plan of Care Reviewed With: patient          Outcome Evaluation: Pt VSS on RA.  R facial pain managed with heat.  Declined tylenol  for pain.   Pt also utilizing essential oils. Pt. up independently in room.  Dressing to L foot CDI.  Due to be changed tomorrow prior to d/c.   Novolog and lantus not given as pt placed her home insulin pump on today. Per pt, diabetes team had given her the OK to do so. Pt requested PIV to be removed. Pt to be discharged home tomorrow with oral abx and to have follow up when she comes back from her trip to [place] on july 18th outpatient.

## 2023-07-02 LAB — BACTERIA BLD CULT: NO GROWTH

## 2023-07-02 NOTE — PROGRESS NOTES
ID brief weekend crosscover note    Chart reviewed this weekend. Noted pt sent on Levo, flagyl, Doxy as planned by Dr John.    Strep species appear Levo S. Doxy should cover Actino    Sent message to ID clinic scheduling to ensure follow up scheduled as planned w Dr Alvaro Marquez  Staff Physician  Division of Infectious Diseases

## 2023-07-02 NOTE — TELEPHONE ENCOUNTER
Action July 1, 2023 8:34 PM MT   Action Taken Sent a request for imaging from .     DIAGNOSIS: ED FOLLOW-UP - CELLULITIS   APPOINTMENT DATE: 07/18/2023   NOTES STATUS DETAILS   OFFICE NOTE from other specialist Care Everywhere    DISCHARGE SUMMARY from hospital Internal 06/22/2023 TO 07/01/2023 - [facility]   DISCHARGE REPORT from the ER Care Everywhere 03/05/2023 - [facility] ED   OPERATIVE REPORT Care Everywhere 05/22/2023 - Partial LT Foot 2nd Toe Amputation   MEDICATION LIST Care Everywhere    LABS     XRAYS (IMAGES & REPORTS) PACS Internal

## 2023-07-03 LAB
BACTERIA ABSC ANAEROBE+AEROBE CULT: ABNORMAL
BACTERIA ABSC ANAEROBE+AEROBE CULT: ABNORMAL
BACTERIA BLD CULT: NO GROWTH
BACTERIA BLD CULT: NO GROWTH

## 2023-07-05 ENCOUNTER — TELEPHONE (OUTPATIENT)
Dept: INFECTIOUS DISEASES | Facility: CLINIC | Age: 58
End: 2023-07-05
Payer: COMMERCIAL

## 2023-07-05 NOTE — TELEPHONE ENCOUNTER
EP called 7/5 to sched a 2-3 (4) week follow up with Dr. John per Dr. Marquez. The appt is set for 8/3 via video.       ----- Message from Nishi Marquez MD sent at 7/2/2023  4:11 PM CDT -----  Hey team,    Pls ensure this pt has ID follow up w Dr John in 2-3 weeks

## 2023-07-12 ENCOUNTER — TELEPHONE (OUTPATIENT)
Dept: CT IMAGING | Facility: CLINIC | Age: 58
End: 2023-07-12
Payer: COMMERCIAL

## 2023-07-12 NOTE — TELEPHONE ENCOUNTER
EP called 7/12 to resched follow up with Dr. John; provider requested to see pt in-person. Appt resched to 8/24 at the [place] location.    ----- Message from Christiana John MD sent at 7/11/2023  9:44 AM CDT -----  Regarding: Reschedule as in-person visit  Hi Team,   Please reschedule this patient as in-person visit rather virtual video and update her about the change.   Thank you,   Christiana

## 2023-07-18 ENCOUNTER — OFFICE VISIT (OUTPATIENT)
Dept: ORTHOPEDICS | Facility: CLINIC | Age: 58
End: 2023-07-18
Payer: COMMERCIAL

## 2023-07-18 ENCOUNTER — PRE VISIT (OUTPATIENT)
Dept: ORTHOPEDICS | Facility: CLINIC | Age: 58
End: 2023-07-18

## 2023-07-18 ENCOUNTER — ANCILLARY PROCEDURE (OUTPATIENT)
Dept: ULTRASOUND IMAGING | Facility: CLINIC | Age: 58
End: 2023-07-18
Attending: PODIATRIST
Payer: COMMERCIAL

## 2023-07-18 ENCOUNTER — ANCILLARY PROCEDURE (OUTPATIENT)
Dept: GENERAL RADIOLOGY | Facility: CLINIC | Age: 58
End: 2023-07-18
Attending: PODIATRIST
Payer: COMMERCIAL

## 2023-07-18 DIAGNOSIS — R60.0 EDEMA LEG: ICD-10-CM

## 2023-07-18 DIAGNOSIS — M79.605 PAIN OF LEFT LOWER EXTREMITY: ICD-10-CM

## 2023-07-18 DIAGNOSIS — M86.172 OTHER ACUTE OSTEOMYELITIS OF LEFT FOOT (H): Primary | ICD-10-CM

## 2023-07-18 DIAGNOSIS — M86.172 OTHER ACUTE OSTEOMYELITIS OF LEFT FOOT (H): ICD-10-CM

## 2023-07-18 PROCEDURE — 93971 EXTREMITY STUDY: CPT | Mod: LT | Performed by: RADIOLOGY

## 2023-07-18 PROCEDURE — 11044 DBRDMT BONE 1ST 20 SQ CM/<: CPT | Performed by: PODIATRIST

## 2023-07-18 PROCEDURE — 73630 X-RAY EXAM OF FOOT: CPT | Mod: LT | Performed by: RADIOLOGY

## 2023-07-18 NOTE — LETTER
7/18/2023         RE: Glenny Campos  [address]  [facility] [address]        Dear Colleague,    Thank you for referring your patient, Glenny Campos, to the [facility] [place]. Please see a copy of my visit note below.    Date of Service: 7/18/2023    Chief Complaint:   Chief Complaint   Patient presents with    Consult     ED follow up        HPI: Glenny is a 57 year old female who presents today for further evaluation of left 3rd digit ulceration. I saw her in the hospital a few weeks ago. During her stay, she waned to leave the hospital to go on a trip to [place]. She has been and noted that she kept the toe covered with betadine. She is wearing her diabetic shoes. No pain to the toe.     Review of Systems: No n/v/d/f/c/ns/sob/cp    PMH:   Past Medical History:   Diagnosis Date    Type 2 diabetes mellitus with diabetic polyneuropathy (H)        PSxH: No past surgical history on file.    Allergies: Cephalosporins, Piperacillin-tazobactam in dex, Statins, Cephalexin, Clindamycin, and Penicillins    SH:   Social History     Socioeconomic History    Marital status:      Spouse name: Not on file    Number of children: Not on file    Years of education: Not on file    Highest education level: Not on file   Occupational History    Not on file   Tobacco Use    Smoking status: Not on file    Smokeless tobacco: Not on file   Substance and Sexual Activity    Alcohol use: Not on file    Drug use: Not on file    Sexual activity: Not on file   Other Topics Concern    Not on file   Social History Narrative    Not on file     Social Determinants of Health     Financial Resource Strain: Not on file   Food Insecurity: Not on file   Transportation Needs: Not on file   Physical Activity: Not on file   Stress: Not on file   Social Connections: Not on file   Intimate Partner Violence: Not on file   Housing Stability: Not on file       FH: No family history on file.    Objective:  Lab  Results   Component Value Date    A1C 6.8 06/22/2023         DP and PT pulses 2/4. CRT 1 second. Diminished pedal hair.  Gross sensation is severely diminished.   Amp of the left hallux and partial 2nd digit.            A wound is noted at left  distal 3rd digit measuring 1cm x 1cm x 0.1cm.     Ross Classification: 3     Wound base: Red  Granulation       Edges: intact.     Drainage: small/serous     Odor: no     Undermining: no     Bone Exposure: Yes: distal phalanx     Clinical Signs of Infection: Yes: + probe to bone     After obtaining patient consent, the wound was irrigated with copious amounts of saline. A tissue nipper was then used to debride the wound into bone tissue. The wound edges were debrided back to healthy, bleeding tissue. The wound base exhibited healthy bleeding. Given the patient's lack of sensation, no anesthesia was necessary for the procedure.     Barriers to Healing: previous amputations. Diabetes.      Treatment Plan: Betadine dressings to toe daily     Pt Ability to Follow Plan: good.      left foot xrays indicated in 3 weightbearing views.    Osteolysis to the distal tuft of the left 3rd distal phalanx.       Assessment: Glenny is a 56 YO with left 3rd digit ulceration with osteomyelitis. The wound does seem improved today. I discussed the options with her. One would be to continue with the doxy, Levaquin, and Flagyl for the full course. This would not change the mechanics of her foot and the wound has potential to reopen. The other options is to amputate the distal phalanx of the digit. Because of how the toe looks today, she would like to cont with abx.     Plan:  - Pt seen and evaluated.  - XRs taken and discussed with her.  - We discussed tx options and she elects to defer sx for now.   - Cont with betadine dressings.   - See again in 2 weeks.  On the DOS, I spent 60 minutes with patient care, documentation, chart and image review, and care coordination.           Sincerely,        Tae Chavira DPM

## 2023-07-18 NOTE — PROGRESS NOTES
Date of Service: 7/18/2023    Chief Complaint:   Chief Complaint   Patient presents with     Consult     ED follow up        HPI: Glenny is a 57 year old female who presents today for further evaluation of left 3rd digit ulceration. I saw her in the hospital a few weeks ago. During her stay, she waned to leave the hospital to go on a trip to [place]. She has been and noted that she kept the toe covered with betadine. She is wearing her diabetic shoes. No pain to the toe.     Review of Systems: No n/v/d/f/c/ns/sob/cp    PMH:   Past Medical History:   Diagnosis Date     Type 2 diabetes mellitus with diabetic polyneuropathy (H)        PSxH: No past surgical history on file.    Allergies: Cephalosporins, Piperacillin-tazobactam in dex, Statins, Cephalexin, Clindamycin, and Penicillins    SH:   Social History     Socioeconomic History     Marital status:      Spouse name: Not on file     Number of children: Not on file     Years of education: Not on file     Highest education level: Not on file   Occupational History     Not on file   Tobacco Use     Smoking status: Not on file     Smokeless tobacco: Not on file   Substance and Sexual Activity     Alcohol use: Not on file     Drug use: Not on file     Sexual activity: Not on file   Other Topics Concern     Not on file   Social History Narrative     Not on file     Social Determinants of Health     Financial Resource Strain: Not on file   Food Insecurity: Not on file   Transportation Needs: Not on file   Physical Activity: Not on file   Stress: Not on file   Social Connections: Not on file   Intimate Partner Violence: Not on file   Housing Stability: Not on file       FH: No family history on file.    Objective:  Lab Results   Component Value Date    A1C 6.8 06/22/2023         DP and PT pulses 2/4. CRT 1 second. Diminished pedal hair.  Gross sensation is severely diminished.   Amp of the left hallux and partial 2nd digit.            A wound is noted at left  distal  3rd digit measuring 1cm x 1cm x 0.1cm.     Ross Classification: 3     Wound base: Red  Granulation       Edges: intact.     Drainage: small/serous     Odor: no     Undermining: no     Bone Exposure: Yes: distal phalanx     Clinical Signs of Infection: Yes: + probe to bone     After obtaining patient consent, the wound was irrigated with copious amounts of saline. A tissue nipper was then used to debride the wound into bone tissue. The wound edges were debrided back to healthy, bleeding tissue. The wound base exhibited healthy bleeding. Given the patient's lack of sensation, no anesthesia was necessary for the procedure.     Barriers to Healing: previous amputations. Diabetes.      Treatment Plan: Betadine dressings to toe daily     Pt Ability to Follow Plan: good.      left foot xrays indicated in 3 weightbearing views.    Osteolysis to the distal tuft of the left 3rd distal phalanx.       Assessment: lGenny is a 56 YO with left 3rd digit ulceration with osteomyelitis. The wound does seem improved today. I discussed the options with her. One would be to continue with the doxy, Levaquin, and Flagyl for the full course. This would not change the mechanics of her foot and the wound has potential to reopen. The other options is to amputate the distal phalanx of the digit. Because of how the toe looks today, she would like to cont with abx.     Plan:  - Pt seen and evaluated.  - XRs taken and discussed with her.  - We discussed tx options and she elects to defer sx for now.   - Cont with betadine dressings.   - See again in 2 weeks.  On the DOS, I spent 60 minutes with patient care, documentation, chart and image review, and care coordination.

## 2023-07-18 NOTE — NURSING NOTE
Reason For Visit:   Chief Complaint   Patient presents with     Consult     ED follow up     Pain Assessment  Patient Currently in Pain: Yes  Patient's Stated Pain Goal: 4    Karen Bennett

## 2023-08-13 ENCOUNTER — HEALTH MAINTENANCE LETTER (OUTPATIENT)
Age: 58
End: 2023-08-13

## 2023-08-24 ENCOUNTER — TELEPHONE (OUTPATIENT)
Dept: INFECTIOUS DISEASES | Facility: CLINIC | Age: 58
End: 2023-08-24
Payer: COMMERCIAL

## 2023-08-24 ENCOUNTER — OFFICE VISIT (OUTPATIENT)
Dept: CT IMAGING | Facility: CLINIC | Age: 58
End: 2023-08-24
Attending: STUDENT IN AN ORGANIZED HEALTH CARE EDUCATION/TRAINING PROGRAM
Payer: COMMERCIAL

## 2023-08-24 VITALS
DIASTOLIC BLOOD PRESSURE: 81 MMHG | TEMPERATURE: 98 F | SYSTOLIC BLOOD PRESSURE: 131 MMHG | BODY MASS INDEX: 40.06 KG/M2 | WEIGHT: 279.2 LBS | HEART RATE: 68 BPM | OXYGEN SATURATION: 99 %

## 2023-08-24 DIAGNOSIS — E11.69 DIABETIC FOOT ULCER WITH OSTEOMYELITIS (H): ICD-10-CM

## 2023-08-24 DIAGNOSIS — L97.509 DIABETIC FOOT ULCER WITH OSTEOMYELITIS (H): ICD-10-CM

## 2023-08-24 DIAGNOSIS — E08.42 DIABETIC POLYNEUROPATHY ASSOCIATED WITH DIABETES MELLITUS DUE TO UNDERLYING CONDITION (H): ICD-10-CM

## 2023-08-24 DIAGNOSIS — M86.9 DIABETIC FOOT ULCER WITH OSTEOMYELITIS (H): ICD-10-CM

## 2023-08-24 DIAGNOSIS — L03.211 FACIAL CELLULITIS: Primary | ICD-10-CM

## 2023-08-24 DIAGNOSIS — E11.621 DIABETIC FOOT ULCER WITH OSTEOMYELITIS (H): ICD-10-CM

## 2023-08-24 PROCEDURE — G0463 HOSPITAL OUTPT CLINIC VISIT: HCPCS | Performed by: STUDENT IN AN ORGANIZED HEALTH CARE EDUCATION/TRAINING PROGRAM

## 2023-08-24 PROCEDURE — 99215 OFFICE O/P EST HI 40 MIN: CPT | Performed by: STUDENT IN AN ORGANIZED HEALTH CARE EDUCATION/TRAINING PROGRAM

## 2023-08-24 ASSESSMENT — PAIN SCALES - GENERAL: PAINLEVEL: NO PAIN (0)

## 2023-08-24 NOTE — PROGRESS NOTES
[facility]  General Infectious Disease Clinic Note:  Follow up Patient     Patient:  Glenny Campos, Date of birth 1965, Medical record number [id number]  Date of Visit:  08/24/2023  Reason for visit: Post hospital discharge follow up regarding left foot diabetic ulcers, osteomyelitis         Assessment and Recommendations:     ID Problem List:  Right sided dental infection following dental procedure complicated by facial cellulitis with (imaging) hemimandible abscess (s/p bedside I&D 6/24) and maxillary sinusitis  - Being treated as SSTI, no clear evidence of bone involvement  - Cultures polymicrobial growth (Str anginosusx2, Str mitis, Str intermedius, Actinomyces odontolyticus)     Chronic wound of left foot - 2nd and 3rd digits  - MRI foot 6/28/2023 suggestive of osteomyelitis involving second proximal phalanx, third distal and middle phalanges; also suspected early osteomyelitis changes of 5th distal phalanx.     Positive blood culture - Staph epidermidis, +6/23 in 2/2 sets with repeat NGTD - unclear if true infection vs pathogen  History of osteomyelitis of left great toe s/p amputation 2020 through metatarsal head, and distal 2nd toe s/p amputation 05/2023  T2DM with polyneuropathy (A1C 6.8)  Multiple antibiotics allergies - penicillin, cephalosporins    Discussion:  57 year old female with PMHx Type 2 DM with retinopathy, peripheral neuropathy, previous left great toe amputation (2020, distal 2nd toe 2023) due to osteomyelitis with  chronic left foot wound who was admitted 6/22 for right sided dental infection following recent dental procedure (6/20) complicated by facial cellulitis and abscess s/p bedside I&D. Hospital course complicated by DKA and positive blood cultures for Staph epidermidis which unclear if true pathogen vs contaminant.     She also found to have osteomyelitis of left 2nd, 3rd toes on MRI, with having nonhealing ulcers overlying it. Evaluated by  Podiatry and offered surgical amputation of left 3rd toe. However, patient deferred surgery due to planned trip outside of the state. Later evaluated as outpatient Podiatry 7/18, whereby documented improvement in wounds, while remained on antibiotics. Refused surgery by patient, and continued antibiotics to complete 6 weeks course through 8/2. Also mentioned that it wound not change the mechanics of her foot and wound has potential to reopen. On today's visit, left foot wounds are stable (pictures uploaded in EMR). Per patient, uses own blend of essential oils to apply during BID wound dressing, which writer has discouraged and advised against it. Patient verbalized understanding. Recommend to continue local wound and skin care with glycemic control and regular Podiatry follow up.     Regarding right facial cellulitis with abscess has resolved. Since hospital discharge, had OMFS follow up 7/18, 8/2. Records are not available to review. There is an residual palpable non tender indurated area, with intact overlying skin. Reportedly, no concern per OMFS perspective. Patient does voiced concern, and defer questions to OMFS team, encouraged patient to reach out. On previous inpatient ID evaluation, did mention to treat longer course of antibiotic due to Actinomyces from right maxilla abscess culture 6/24/2023 However, patient remained off of antibiotics since 8/2, and clinically well. At this time, suggest to monitor clinically.     Recommendations:  Completed antibiotics through 8/2/2023  No indication for additional course of antibiotic  Continue local wound and skin care. Discussed and emphasize about use of essential in wound care which is not well studies per ID perspective rather ideally suggest would avoid it.   Encourage to follow up with Podiatry.   Defer to OMFS regarding residual nontender induration area overlying right maxilla. No obvious signs of active infection on today's exam.   Will attempt to obtain  records from OMFS about recent visits (7/18, 8/2)   Return to ID clinic as needed.     Plan is discussed with the patient who verbalized understanding.     40 minutes spent by me on the date of the encounter doing chart review, history and exam, documentation and further activities per the note.    Christiana John MD  Infectious Diseases Staff Physician  Pager: [phone]         Interval history:      57 year old female with past medical history significant for Type 2 DM with retinopathy, peripheral neuropathy, previous left great toe amputation (2020, distal 2nd toe 2023) due to osteomyelitis with  chronic left foot wound who was admitted 6/22 for right sided dental infection following recent dental procedure (6/20) complicated by facial cellulitis and abscess s/p bedside I&D (culture pending). Hospital course complicated by DKA and positive blood cultures for Staph epidermidis (started on IV vancomycin). Concern also for nonhealing chronic left foot wound for likely osteomyelitis. She has multiple reported antibiotic allergies/intolerances which make choosing a regimen difficult, in addition to the fact that patient refused to take oral antibiotics following her dental procedure and likely also after her recent May toe amputation (outside podiatry concerned for poor healing 6/7). She uses essential oils instead of antibiotics - both topically to foot and ingesting.     Please refer to previous ID notes and discharge summary 7/1 for details of clinical course during recent hospitalization. Today presents for follow up. Denies fever, chills, sweating. Right facial cellulitis, and abscess is resolved. Though a small area of painless, hard to touch with no discharge. Seen by OMFS 7/18, 8/2 and per patient, was told that doing well and no other issues. She was also seen by Podiatry on 7/18, whereby surgery is refused by the patient as of now.         Review of Systems:     Targeted 10-point ROS is negative except  as mentioned above under interval history      Antimicrobial history:     Doxycycline 7/1 - 8/2  Levofloxacin, Metronidazole 6/22/2023 - 8/2/2023    Vancomycin 6/24 - 7/1         Past Medical History:   Diagnosis Date    Type 2 diabetes mellitus with diabetic polyneuropathy (H)        No past surgical history on file.    No family history on file.    Social History     Social History Narrative    Not on file            There is no immunization history on file for this patient.    Patient Active Problem List   Diagnosis    Facial cellulitis       No outpatient medications have been marked as taking for the 8/24/23 encounter (Appointment) with Christiana John MD.       Allergies   Allergen Reactions    Cephalosporins      Other reaction(s): *Unknown    Piperacillin-Tazobactam In Dex Hives    Statins Muscle Pain (Myalgia)    Cephalexin Rash    Clindamycin Rash     possible rash, was on zosyn at the same time      Penicillins Hives and Rash              Physical Exam:   Vitals were reviewed.  All vitals stable  There were no vitals taken for this visit.  Wt Readings from Last 4 Encounters:   06/30/23 124.9 kg (275 lb 4.8 oz)       Exam:  GENERAL:  Well-developed, well-nourished, sitting in bed in no acute distress.   Head: normocephalic, atraumatic.   EYES: PERRLA, EOMI, conjunctiva clear, anicteric sclerae.    ENT:  Oropharynx is moist without exudates or ulcers.  NECK:  Supple.  LUNGS:  Breathing comfortably, on room air, clear to auscultation bilaterally, no w/r/r.  CARDIOVASCULAR:  Regular rate and rhythm, +S1S2 with no murmurs, gallops or rubs.  ABDOMEN:  Normal bowel sounds, soft, nontender. No appreciable hepatosplenomegaly.   : no suprapubic or flank tendterness.   EXT: Extremities warm and without edema. Left foot examined, pictures uploaded in EMR  SKIN:  No acute rashes.    NEUROLOGIC:  Grossly nonfocal.     Labs, Microbiology and Imaging studies are reviewed.      ==================================================================  Last Culture results   Culture   Date Value Ref Range Status   06/28/2023 No Growth  Final   06/28/2023 No Growth  Final   06/27/2023 No Growth  Final   06/26/2023 No Growth  Final   06/25/2023 No Growth  Final   06/24/2023 No Growth  Final   06/24/2023 No Growth  Final   06/24/2023 4+ Actinomyces odontolyticus (A)  Final     Comment:     Susceptibilities not routinely done, refer to antibiogram to view typical susceptibility profiles   06/24/2023 3+ Streptococcus intermedius (A)  Final     Comment:     Not isolated or reported on routine aerobic cultureThis organism is susceptible to ampicillin, penicillin, vancomycin and the cephalosporins. If treatment is required and your patient is allergic to penicillin, contact the microbiology lab within 5   days to request susceptibility testing.   06/24/2023 4+ Streptococcus anginosus (A)  Final     Comment:     This organism is susceptible to ampicillin, penicillin, vancomycin and the cephalosporins. If treatment is required and your patient is allergic to penicillin, contact the microbiology lab within 5 days to request susceptibility testing.Beta hemolyt  ic strain   06/24/2023 4+ Streptococcus mitis group (A)  Final     Comment:     This organism is susceptible to ampicillin, penicillin, vancomycin and the cephalosporins. If treatment is required and your patient is allergic to penicillin, contact the microbiology lab within 5 days to request susceptibility testing.   06/24/2023 4+ Streptococcus anginosus (A)  Final     Comment:     This organism is susceptible to ampicillin, penicillin, vancomycin and the cephalosporins. If treatment is required and your patient is allergic to penicillin, contact the microbiology lab within 5 days to request susceptibility testing.   06/24/2023 4+ Actinomyces odontolyticus (A)  Corrected     Comment:     Susceptibilities not routinely done, refer to antibiogram to  view typical susceptibility profiles.  See susceptibility testing on Anaerobic culture.   06/23/2023 Positive on the 1st day of incubation (A)  Final   06/23/2023 Staphylococcus epidermidis (AA)  Final     Comment:     1 of 2 bottles   06/23/2023 Positive on the 1st day of incubation (A)  Final   06/23/2023 Staphylococcus epidermidis (AA)  Final     Comment:     2 of 2 bottlesSusceptibilities done on previous cultures   06/23/2023 No Growth  Final           Imaging:  Results for orders placed or performed in visit on 07/18/23   US Lower Extremity Venous Duplex Left    Narrative    EXAMINATION: DOPPLER VENOUS ULTRASOUND OF THE LEFT LOWER EXTREMITY,  7/18/2023 12:22 PM     COMPARISON: None.    HISTORY: Pain of left lower extremity, edema, pain and edema in left  leg status post CVA drive.    TECHNIQUE:  Gray-scale evaluation with compression, spectral flow, and  color Doppler assessment of the deep venous system of the left leg  from groin to knee, and then at the ankle.    FINDINGS:  In the left lower extremity, the common femoral, femoral, popliteal  and posterior tibial veins demonstrate normal compressibility and  blood flow. Subcutaneous edema.      Impression    IMPRESSION:  1.  No evidence left lower extremity deep venous thrombosis.  2.  Subcutaneous edema.    I have personally reviewed the examination and initial interpretation  and I agree with the findings.    SARKIS JACKSON MD         SYSTEM ID:  [id number]

## 2023-08-24 NOTE — TELEPHONE ENCOUNTER
Called clinic and asked for records. 6/20 is the only date she was there. At the [facility].     Called [facility] - get records from [facility] clinic visits 7/18, 8/2   Clinic phone number: [phone]  Clinic fax number: [phone]    Left a message for request of records with call back number.       Cecilio Martinez RN  Infectious Disease on 8/24/2023 at 9:03 AM

## 2023-08-24 NOTE — NURSING NOTE
Chief Complaint   Patient presents with    RECHECK          /81 (BP Location: Right arm, Patient Position: Sitting, Cuff Size: Adult Large)   Pulse 68   Temp 98  F (36.7  C) (Oral)   Wt 126.6 kg (279 lb 3.2 oz)   SpO2 99%   BMI 40.06 kg/m  Mandie Paul on 8/24/2023 at 8:07 AM

## 2023-09-22 ENCOUNTER — OFFICE VISIT (OUTPATIENT)
Dept: ORTHOPEDICS | Facility: CLINIC | Age: 58
End: 2023-09-22
Payer: COMMERCIAL

## 2023-09-22 DIAGNOSIS — L97.522 ULCER OF LEFT FOOT WITH FAT LAYER EXPOSED (H): ICD-10-CM

## 2023-09-22 DIAGNOSIS — E11.49 TYPE II OR UNSPECIFIED TYPE DIABETES MELLITUS WITH NEUROLOGICAL MANIFESTATIONS, NOT STATED AS UNCONTROLLED(250.60) (H): Primary | ICD-10-CM

## 2023-09-22 PROCEDURE — 99213 OFFICE O/P EST LOW 20 MIN: CPT | Performed by: PODIATRIST

## 2023-09-22 NOTE — NURSING NOTE
Reason For Visit:   Chief Complaint   Patient presents with    RECHECK     2 month follow up       Pain Assessment  Patient Currently in Pain: Ana Laura Bennett

## 2023-09-22 NOTE — LETTER
9/22/2023         RE: Glenny Campos  [address] [place] [facility] [address]        Dear Colleague,    Thank you for referring your patient, Glenny Campos, to the [facility] [place]. Please see a copy of my visit note below.    Chief Complaint   Patient presents with    RECHECK     2 month follow up         HPI: Glenny is a 57 year old female who presents today for further evaluation of left 3rd digit ulceration. I saw her in the hospital a few weeks ago. During her stay, she waned to leave the hospital to go on a trip to [place]. She has been and noted that she kept the toe covered with betadine. She is wearing her diabetic shoes. No pain to the toe.     Interval history: I last saw her 18 July.  Since then, she has seen infectious disease.  She finished her antibiotics on 2 August.  At that time, infectious disease did not see any indication for continuing antibiotics.  She relates that the wound on the toe has healed.    Review of Systems: No n/v/d/f/c/ns/sob/cp    PMH:   Past Medical History:   Diagnosis Date    Type 2 diabetes mellitus with diabetic polyneuropathy (H)        PSxH: No past surgical history on file.    Allergies: Cephalosporins, Piperacillin-tazobactam in dex, Statins, Cephalexin, Clindamycin, and Penicillins    SH:   Social History     Socioeconomic History    Marital status:      Spouse name: Not on file    Number of children: Not on file    Years of education: Not on file    Highest education level: Not on file   Occupational History    Not on file   Tobacco Use    Smoking status: Never    Smokeless tobacco: Never   Vaping Use    Vaping Use: Never used   Substance and Sexual Activity    Alcohol use: Not on file    Drug use: Not on file    Sexual activity: Not on file   Other Topics Concern    Not on file   Social History Narrative    Not on file     Social Determinants of Health     Financial Resource Strain: Not on file   Food Insecurity: Not on  file   Transportation Needs: Not on file   Physical Activity: Not on file   Stress: Not on file   Social Connections: Not on file   Interpersonal Safety: Not on file   Housing Stability: Not on file       FH: No family history on file.    Objective:  Lab Results   Component Value Date    A1C 6.8 06/22/2023         DP and PT pulses 2/4. CRT 1 second. Diminished pedal hair.  Gross sensation is severely diminished.   Amp of the left hallux and partial 2nd digit.            A wound is noted at left  distal 3rd digit has healed.  She does have a slight opening to the dorsal aspect of the digit at the MTPJ.  This goes down to subcutaneous.  No signs or symptoms of infection noted.    Assessment: Glenny is a 58 YO with left 3rd digit ulceration with osteomyelitis.  The main wound that she had did heal over.  She has a slight residual wound on the top of the toe.  Recommend that she use Iodosorb and a Primapore on the area.  She will do this.    Plan:  - Pt seen and evaluated.  -Activity as tolerated.  -Keep the area covered as directed.  - See again in 3 months.         Tae Chavira, CHANDANA

## 2023-09-22 NOTE — PROGRESS NOTES
Chief Complaint   Patient presents with    RECHECK     2 month follow up         HPI: Glenny is a 57 year old female who presents today for further evaluation of left 3rd digit ulceration. I saw her in the hospital a few weeks ago. During her stay, she waned to leave the hospital to go on a trip to [place]. She has been and noted that she kept the toe covered with betadine. She is wearing her diabetic shoes. No pain to the toe.     Interval history: I last saw her 18 July.  Since then, she has seen infectious disease.  She finished her antibiotics on 2 August.  At that time, infectious disease did not see any indication for continuing antibiotics.  She relates that the wound on the toe has healed.    Review of Systems: No n/v/d/f/c/ns/sob/cp    PMH:   Past Medical History:   Diagnosis Date    Type 2 diabetes mellitus with diabetic polyneuropathy (H)        PSxH: No past surgical history on file.    Allergies: Cephalosporins, Piperacillin-tazobactam in dex, Statins, Cephalexin, Clindamycin, and Penicillins    SH:   Social History     Socioeconomic History    Marital status:      Spouse name: Not on file    Number of children: Not on file    Years of education: Not on file    Highest education level: Not on file   Occupational History    Not on file   Tobacco Use    Smoking status: Never    Smokeless tobacco: Never   Vaping Use    Vaping Use: Never used   Substance and Sexual Activity    Alcohol use: Not on file    Drug use: Not on file    Sexual activity: Not on file   Other Topics Concern    Not on file   Social History Narrative    Not on file     Social Determinants of Health     Financial Resource Strain: Not on file   Food Insecurity: Not on file   Transportation Needs: Not on file   Physical Activity: Not on file   Stress: Not on file   Social Connections: Not on file   Interpersonal Safety: Not on file   Housing Stability: Not on file       FH: No family history on file.    Objective:  Lab Results    Component Value Date    A1C 6.8 06/22/2023         DP and PT pulses 2/4. CRT 1 second. Diminished pedal hair.  Gross sensation is severely diminished.   Amp of the left hallux and partial 2nd digit.            A wound is noted at left  distal 3rd digit has healed.  She does have a slight opening to the dorsal aspect of the digit at the MTPJ.  This goes down to subcutaneous.  No signs or symptoms of infection noted.    Assessment: Glenny is a 58 YO with left 3rd digit ulceration with osteomyelitis.  The main wound that she had did heal over.  She has a slight residual wound on the top of the toe.  Recommend that she use Iodosorb and a Primapore on the area.  She will do this.    Plan:  - Pt seen and evaluated.  -Activity as tolerated.  -Keep the area covered as directed.  - See again in 3 months.

## 2023-10-22 ENCOUNTER — HEALTH MAINTENANCE LETTER (OUTPATIENT)
Age: 58
End: 2023-10-22

## 2023-12-22 ENCOUNTER — OFFICE VISIT (OUTPATIENT)
Dept: ORTHOPEDICS | Facility: CLINIC | Age: 58
End: 2023-12-22
Payer: COMMERCIAL

## 2023-12-22 DIAGNOSIS — E11.49 TYPE II OR UNSPECIFIED TYPE DIABETES MELLITUS WITH NEUROLOGICAL MANIFESTATIONS, NOT STATED AS UNCONTROLLED(250.60) (H): Primary | ICD-10-CM

## 2023-12-22 DIAGNOSIS — Z89.412 LEFT GREAT TOE AMPUTEE (H): ICD-10-CM

## 2023-12-22 PROCEDURE — 99213 OFFICE O/P EST LOW 20 MIN: CPT | Performed by: PODIATRIST

## 2023-12-22 NOTE — LETTER
12/22/2023         RE: Glenny Campos  [address]  [facility] [address]        Dear Colleague,    Thank you for referring your patient, Glenny Campos, to the [facility] [place]. Please see a copy of my visit note below.    Chief Complaint   Patient presents with    Follow Up     3 month follow up.              HPI: Glenny is a 58 year old female who presents today for further evaluation of left 3rd digit ulceration. She did pick at the toe. No pain.       Review of Systems: No n/v/d/f/c/ns/sob/cp    PMH:   Past Medical History:   Diagnosis Date    Type 2 diabetes mellitus with diabetic polyneuropathy (H)        PSxH: No past surgical history on file.    Allergies: Cephalosporins, Piperacillin-tazobactam in dex, Statins, Cephalexin, Clindamycin, and Penicillins    SH:   Social History     Socioeconomic History    Marital status:      Spouse name: Not on file    Number of children: Not on file    Years of education: Not on file    Highest education level: Not on file   Occupational History    Not on file   Tobacco Use    Smoking status: Never    Smokeless tobacco: Never   Vaping Use    Vaping Use: Never used   Substance and Sexual Activity    Alcohol use: Not on file    Drug use: Not on file    Sexual activity: Not on file   Other Topics Concern    Not on file   Social History Narrative    Not on file     Social Determinants of Health     Financial Resource Strain: Not on file   Food Insecurity: Not on file   Transportation Needs: Not on file   Physical Activity: Not on file   Stress: Not on file   Social Connections: Not on file   Interpersonal Safety: Not on file   Housing Stability: Not on file       FH: No family history on file.    Objective:  Lab Results   Component Value Date    A1C 6.8 06/22/2023         DP and PT pulses 2/4. CRT 1 second. Diminished pedal hair.  Gross sensation is severely diminished.   Amp of the left hallux and partial 2nd digit.             A wound is noted at left  distal 3rd digit has healed.  She does have a slight opening to the dorsal aspect of the digit at the MTPJ.  This goes down to subcutaneous.  No signs or symptoms of infection noted.    Assessment: Glenny is a 58 YO with left 3rd digit ulceration with osteomyelitis.  The main wound that she had did heal over.  Start Sween to the foot.     Plan:  - Pt seen and evaluated.  -Activity as tolerated.  -Keep the area covered as directed.  - See again in 4 months.             Tae Chavira DPM

## 2023-12-22 NOTE — PROGRESS NOTES
Chief Complaint   Patient presents with    Follow Up     3 month follow up.              HPI: Glenny is a 58 year old female who presents today for further evaluation of left 3rd digit ulceration. She did pick at the toe. No pain.       Review of Systems: No n/v/d/f/c/ns/sob/cp    PMH:   Past Medical History:   Diagnosis Date    Type 2 diabetes mellitus with diabetic polyneuropathy (H)        PSxH: No past surgical history on file.    Allergies: Cephalosporins, Piperacillin-tazobactam in dex, Statins, Cephalexin, Clindamycin, and Penicillins    SH:   Social History     Socioeconomic History    Marital status:      Spouse name: Not on file    Number of children: Not on file    Years of education: Not on file    Highest education level: Not on file   Occupational History    Not on file   Tobacco Use    Smoking status: Never    Smokeless tobacco: Never   Vaping Use    Vaping Use: Never used   Substance and Sexual Activity    Alcohol use: Not on file    Drug use: Not on file    Sexual activity: Not on file   Other Topics Concern    Not on file   Social History Narrative    Not on file     Social Determinants of Health     Financial Resource Strain: Not on file   Food Insecurity: Not on file   Transportation Needs: Not on file   Physical Activity: Not on file   Stress: Not on file   Social Connections: Not on file   Interpersonal Safety: Not on file   Housing Stability: Not on file       FH: No family history on file.    Objective:  Lab Results   Component Value Date    A1C 6.8 06/22/2023         DP and PT pulses 2/4. CRT 1 second. Diminished pedal hair.  Gross sensation is severely diminished.   Amp of the left hallux and partial 2nd digit.            A wound is noted at left  distal 3rd digit has healed.  She does have a slight opening to the dorsal aspect of the digit at the MTPJ.  This goes down to subcutaneous.  No signs or symptoms of infection noted.    Assessment: Gelnny is a 58 YO with left 3rd digit  ulceration with osteomyelitis.  The main wound that she had did heal over.  Start Sween to the foot.     Plan:  - Pt seen and evaluated.  -Activity as tolerated.  -Keep the area covered as directed.  - See again in 4 months.

## 2024-03-10 ENCOUNTER — HEALTH MAINTENANCE LETTER (OUTPATIENT)
Age: 59
End: 2024-03-10

## 2024-04-19 ENCOUNTER — OFFICE VISIT (OUTPATIENT)
Dept: ORTHOPEDICS | Facility: CLINIC | Age: 59
End: 2024-04-19
Payer: COMMERCIAL

## 2024-04-19 DIAGNOSIS — E11.49 TYPE II OR UNSPECIFIED TYPE DIABETES MELLITUS WITH NEUROLOGICAL MANIFESTATIONS, NOT STATED AS UNCONTROLLED(250.60) (H): Primary | ICD-10-CM

## 2024-04-19 DIAGNOSIS — Z89.412 LEFT GREAT TOE AMPUTEE (H): ICD-10-CM

## 2024-04-19 PROCEDURE — 99213 OFFICE O/P EST LOW 20 MIN: CPT | Performed by: PODIATRIST

## 2024-04-19 NOTE — PROGRESS NOTES
Chief Complaint   Patient presents with    Follow Up     4 month follow up.              HPI: Glenny is a 58 year old female who presents today for further evaluation of left 3rd digit ulceration. No pain.       Review of Systems: No n/v/d/f/c/ns/sob/cp    PMH:   Past Medical History:   Diagnosis Date    Type 2 diabetes mellitus with diabetic polyneuropathy (H)        PSxH: No past surgical history on file.    Allergies: Cephalosporins, Piperacillin-tazobactam in dex, Statins, Cephalexin, Clindamycin, and Penicillins    SH:   Social History     Socioeconomic History    Marital status:      Spouse name: Not on file    Number of children: Not on file    Years of education: Not on file    Highest education level: Not on file   Occupational History    Not on file   Tobacco Use    Smoking status: Never    Smokeless tobacco: Never   Vaping Use    Vaping status: Never Used   Substance and Sexual Activity    Alcohol use: Not on file    Drug use: Not on file    Sexual activity: Not on file   Other Topics Concern    Not on file   Social History Narrative    Not on file     Social Determinants of Health     Financial Resource Strain: Not on file   Food Insecurity: Not on file   Transportation Needs: Not on file   Physical Activity: Not on file   Stress: Not on file   Social Connections: Not on file   Interpersonal Safety: Not on file   Housing Stability: Not on file       FH: No family history on file.    Objective:  Lab Results   Component Value Date    A1C 6.8 06/22/2023         DP and PT pulses 2/4. CRT 1 second. Diminished pedal hair.  Gross sensation is severely diminished.   Amp of the left hallux and partial 2nd digit.   A wound is noted at left  distal 3rd digit has healed.   No signs or symptoms of infection noted.    Assessment: Glenny is a 57 YO with left 3rd digit ulceration with osteomyelitis.  The main wound that she had did heal over.  Start Sween to the foot.     Plan:  - Pt seen and  evaluated.  -Activity as tolerated.  - Rx for diabetic shoes.   -Keep the area covered as directed.  - See again in 4 months.

## 2024-04-19 NOTE — LETTER
4/19/2024         RE: Glenny Campos  [address]  [facility] [address]        Dear Colleague,    Thank you for referring your patient, Glenny Campos, to the [facility] [place]. Please see a copy of my visit note below.    Chief Complaint   Patient presents with    Follow Up     4 month follow up.              HPI: Glenny is a 58 year old female who presents today for further evaluation of left 3rd digit ulceration. No pain.       Review of Systems: No n/v/d/f/c/ns/sob/cp    PMH:   Past Medical History:   Diagnosis Date    Type 2 diabetes mellitus with diabetic polyneuropathy (H)        PSxH: No past surgical history on file.    Allergies: Cephalosporins, Piperacillin-tazobactam in dex, Statins, Cephalexin, Clindamycin, and Penicillins    SH:   Social History     Socioeconomic History    Marital status:      Spouse name: Not on file    Number of children: Not on file    Years of education: Not on file    Highest education level: Not on file   Occupational History    Not on file   Tobacco Use    Smoking status: Never    Smokeless tobacco: Never   Vaping Use    Vaping status: Never Used   Substance and Sexual Activity    Alcohol use: Not on file    Drug use: Not on file    Sexual activity: Not on file   Other Topics Concern    Not on file   Social History Narrative    Not on file     Social Determinants of Health     Financial Resource Strain: Not on file   Food Insecurity: Not on file   Transportation Needs: Not on file   Physical Activity: Not on file   Stress: Not on file   Social Connections: Not on file   Interpersonal Safety: Not on file   Housing Stability: Not on file       FH: No family history on file.    Objective:  Lab Results   Component Value Date    A1C 6.8 06/22/2023         DP and PT pulses 2/4. CRT 1 second. Diminished pedal hair.  Gross sensation is severely diminished.   Amp of the left hallux and partial 2nd digit.   A wound is noted at left   distal 3rd digit has healed.   No signs or symptoms of infection noted.    Assessment: Glenny is a 57 YO with left 3rd digit ulceration with osteomyelitis.  The main wound that she had did heal over.  Start Sween to the foot.     Plan:  - Pt seen and evaluated.  -Activity as tolerated.  - Rx for diabetic shoes.   -Keep the area covered as directed.  - See again in 4 months.           Tae Chavira DPM

## 2024-05-07 ENCOUNTER — OFFICE VISIT (OUTPATIENT)
Dept: ORTHOPEDICS | Facility: CLINIC | Age: 59
End: 2024-05-07
Payer: COMMERCIAL

## 2024-05-07 ENCOUNTER — LAB (OUTPATIENT)
Dept: LAB | Facility: CLINIC | Age: 59
End: 2024-05-07
Payer: COMMERCIAL

## 2024-05-07 ENCOUNTER — HOSPITAL ENCOUNTER (INPATIENT)
Facility: CLINIC | Age: 59
LOS: 1 days | Discharge: HOME | DRG: 638 | End: 2024-05-09
Attending: STUDENT IN AN ORGANIZED HEALTH CARE EDUCATION/TRAINING PROGRAM | Admitting: STUDENT IN AN ORGANIZED HEALTH CARE EDUCATION/TRAINING PROGRAM
Payer: COMMERCIAL

## 2024-05-07 ENCOUNTER — ANCILLARY PROCEDURE (OUTPATIENT)
Dept: GENERAL RADIOLOGY | Facility: CLINIC | Age: 59
End: 2024-05-07
Attending: PODIATRIST
Payer: COMMERCIAL

## 2024-05-07 ENCOUNTER — APPOINTMENT (OUTPATIENT)
Dept: CT IMAGING | Facility: CLINIC | Age: 59
DRG: 638 | End: 2024-05-07
Attending: PHYSICIAN ASSISTANT
Payer: COMMERCIAL

## 2024-05-07 ENCOUNTER — APPOINTMENT (OUTPATIENT)
Dept: GENERAL RADIOLOGY | Facility: CLINIC | Age: 59
DRG: 638 | End: 2024-05-07
Attending: PHYSICIAN ASSISTANT
Payer: COMMERCIAL

## 2024-05-07 DIAGNOSIS — L97.522 ULCER OF FOOT, LEFT, WITH FAT LAYER EXPOSED (H): ICD-10-CM

## 2024-05-07 DIAGNOSIS — E11.628 DIABETIC FOOT INFECTION (H): ICD-10-CM

## 2024-05-07 DIAGNOSIS — L08.9 DIABETIC FOOT INFECTION (H): ICD-10-CM

## 2024-05-07 DIAGNOSIS — L08.9: Primary | ICD-10-CM

## 2024-05-07 DIAGNOSIS — S91.332A PUNCTURE WOUND OF PLANTAR ASPECT OF LEFT FOOT WITH INFECTION, INITIAL ENCOUNTER: ICD-10-CM

## 2024-05-07 DIAGNOSIS — L08.9 PUNCTURE WOUND OF PLANTAR ASPECT OF LEFT FOOT WITH INFECTION, SUBSEQUENT ENCOUNTER: ICD-10-CM

## 2024-05-07 DIAGNOSIS — E11.49 TYPE II OR UNSPECIFIED TYPE DIABETES MELLITUS WITH NEUROLOGICAL MANIFESTATIONS, NOT STATED AS UNCONTROLLED(250.60) (H): ICD-10-CM

## 2024-05-07 DIAGNOSIS — S91.332D PUNCTURE WOUND OF PLANTAR ASPECT OF LEFT FOOT WITH INFECTION, SUBSEQUENT ENCOUNTER: ICD-10-CM

## 2024-05-07 DIAGNOSIS — L97.512 SKIN ULCER OF TOE OF RIGHT FOOT WITH FAT LAYER EXPOSED (H): ICD-10-CM

## 2024-05-07 DIAGNOSIS — E11.49 TYPE II OR UNSPECIFIED TYPE DIABETES MELLITUS WITH NEUROLOGICAL MANIFESTATIONS, NOT STATED AS UNCONTROLLED(250.60) (H): Primary | ICD-10-CM

## 2024-05-07 DIAGNOSIS — L03.116 CELLULITIS OF LEFT LOWER LEG: ICD-10-CM

## 2024-05-07 DIAGNOSIS — L08.9 PUNCTURE WOUND OF PLANTAR ASPECT OF LEFT FOOT WITH INFECTION, INITIAL ENCOUNTER: ICD-10-CM

## 2024-05-07 DIAGNOSIS — S91.339A: Primary | ICD-10-CM

## 2024-05-07 LAB
ALBUMIN SERPL BCG-MCNC: 4.1 G/DL (ref 3.5–5.2)
ALBUMIN UR-MCNC: NEGATIVE MG/DL
ALP SERPL-CCNC: 92 U/L (ref 40–150)
ALT SERPL W P-5'-P-CCNC: 12 U/L (ref 0–50)
ANION GAP SERPL CALCULATED.3IONS-SCNC: 10 MMOL/L (ref 7–15)
APPEARANCE UR: CLEAR
AST SERPL W P-5'-P-CCNC: 22 U/L (ref 0–45)
BACTERIA #/AREA URNS HPF: ABNORMAL /HPF
BILIRUB SERPL-MCNC: 0.3 MG/DL
BILIRUB UR QL STRIP: NEGATIVE
BUN SERPL-MCNC: 8.6 MG/DL (ref 6–20)
CALCIUM SERPL-MCNC: 9 MG/DL (ref 8.6–10)
CHLORIDE SERPL-SCNC: 109 MMOL/L (ref 98–107)
COLOR UR AUTO: ABNORMAL
CREAT SERPL-MCNC: 0.6 MG/DL (ref 0.51–0.95)
CRP SERPL-MCNC: 30.4 MG/L
DEPRECATED HCO3 PLAS-SCNC: 22 MMOL/L (ref 22–29)
EGFRCR SERPLBLD CKD-EPI 2021: >90 ML/MIN/1.73M2
ERYTHROCYTE [DISTWIDTH] IN BLOOD BY AUTOMATED COUNT: 12.1 % (ref 10–15)
ERYTHROCYTE [SEDIMENTATION RATE] IN BLOOD BY WESTERGREN METHOD: 21 MM/HR (ref 0–30)
GLUCOSE BLDC GLUCOMTR-MCNC: 116 MG/DL (ref 70–99)
GLUCOSE SERPL-MCNC: 142 MG/DL (ref 70–99)
GLUCOSE UR STRIP-MCNC: NEGATIVE MG/DL
HBA1C MFR BLD: 7.7 %
HCT VFR BLD AUTO: 38.7 % (ref 35–47)
HGB BLD-MCNC: 13.6 G/DL (ref 11.7–15.7)
HGB UR QL STRIP: NEGATIVE
KETONES UR STRIP-MCNC: NEGATIVE MG/DL
LEUKOCYTE ESTERASE UR QL STRIP: NEGATIVE
MCH RBC QN AUTO: 31.3 PG (ref 26.5–33)
MCHC RBC AUTO-ENTMCNC: 35.1 G/DL (ref 31.5–36.5)
MCV RBC AUTO: 89 FL (ref 78–100)
MRSA DNA SPEC QL NAA+PROBE: NEGATIVE
MUCOUS THREADS #/AREA URNS LPF: PRESENT /LPF
NITRATE UR QL: NEGATIVE
PH UR STRIP: 5.5 [PH] (ref 5–7)
PLATELET # BLD AUTO: 232 10E3/UL (ref 150–450)
POTASSIUM SERPL-SCNC: 4.4 MMOL/L (ref 3.4–5.3)
PROT SERPL-MCNC: 7 G/DL (ref 6.4–8.3)
RBC # BLD AUTO: 4.34 10E6/UL (ref 3.8–5.2)
RBC URINE: 0 /HPF
SA TARGET DNA: POSITIVE
SODIUM SERPL-SCNC: 141 MMOL/L (ref 135–145)
SP GR UR STRIP: 1.02 (ref 1–1.03)
SPERM #/AREA URNS HPF: PRESENT /HPF
SQUAMOUS EPITHELIAL: <1 /HPF
UROBILINOGEN UR STRIP-MCNC: NORMAL MG/DL
WBC # BLD AUTO: 8.7 10E3/UL (ref 4–11)
WBC URINE: 1 /HPF

## 2024-05-07 PROCEDURE — 73701 CT LOWER EXTREMITY W/DYE: CPT | Mod: LT

## 2024-05-07 PROCEDURE — 87640 STAPH A DNA AMP PROBE: CPT | Performed by: PHYSICIAN ASSISTANT

## 2024-05-07 PROCEDURE — 85027 COMPLETE CBC AUTOMATED: CPT | Performed by: PATHOLOGY

## 2024-05-07 PROCEDURE — 83036 HEMOGLOBIN GLYCOSYLATED A1C: CPT | Performed by: PHYSICIAN ASSISTANT

## 2024-05-07 PROCEDURE — 250N000011 HC RX IP 250 OP 636: Performed by: STUDENT IN AN ORGANIZED HEALTH CARE EDUCATION/TRAINING PROGRAM

## 2024-05-07 PROCEDURE — 73630 X-RAY EXAM OF FOOT: CPT | Mod: LT | Performed by: RADIOLOGY

## 2024-05-07 PROCEDURE — 99207 PR APP CREDIT; MD BILLING SHARED VISIT: CPT | Mod: FS | Performed by: STUDENT IN AN ORGANIZED HEALTH CARE EDUCATION/TRAINING PROGRAM

## 2024-05-07 PROCEDURE — 71046 X-RAY EXAM CHEST 2 VIEWS: CPT

## 2024-05-07 PROCEDURE — 71046 X-RAY EXAM CHEST 2 VIEWS: CPT | Mod: 26 | Performed by: RADIOLOGY

## 2024-05-07 PROCEDURE — 36415 COLL VENOUS BLD VENIPUNCTURE: CPT | Performed by: PHYSICIAN ASSISTANT

## 2024-05-07 PROCEDURE — 87075 CULTR BACTERIA EXCEPT BLOOD: CPT | Performed by: PODIATRIST

## 2024-05-07 PROCEDURE — 99000 SPECIMEN HANDLING OFFICE-LAB: CPT | Performed by: PATHOLOGY

## 2024-05-07 PROCEDURE — 250N000009 HC RX 250: Performed by: STUDENT IN AN ORGANIZED HEALTH CARE EDUCATION/TRAINING PROGRAM

## 2024-05-07 PROCEDURE — 87040 BLOOD CULTURE FOR BACTERIA: CPT | Performed by: PHYSICIAN ASSISTANT

## 2024-05-07 PROCEDURE — 80053 COMPREHEN METABOLIC PANEL: CPT | Performed by: PATHOLOGY

## 2024-05-07 PROCEDURE — 73660 X-RAY EXAM OF TOE(S): CPT | Mod: RT | Performed by: RADIOLOGY

## 2024-05-07 PROCEDURE — 36415 COLL VENOUS BLD VENIPUNCTURE: CPT | Performed by: PATHOLOGY

## 2024-05-07 PROCEDURE — 86140 C-REACTIVE PROTEIN: CPT | Performed by: PATHOLOGY

## 2024-05-07 PROCEDURE — 82962 GLUCOSE BLOOD TEST: CPT

## 2024-05-07 PROCEDURE — 99417 PROLNG OP E/M EACH 15 MIN: CPT | Performed by: PODIATRIST

## 2024-05-07 PROCEDURE — 85652 RBC SED RATE AUTOMATED: CPT | Performed by: PATHOLOGY

## 2024-05-07 PROCEDURE — 99215 OFFICE O/P EST HI 40 MIN: CPT | Mod: 25 | Performed by: PODIATRIST

## 2024-05-07 PROCEDURE — 258N000003 HC RX IP 258 OP 636: Performed by: STUDENT IN AN ORGANIZED HEALTH CARE EDUCATION/TRAINING PROGRAM

## 2024-05-07 PROCEDURE — 73701 CT LOWER EXTREMITY W/DYE: CPT | Mod: LT,XS

## 2024-05-07 PROCEDURE — 99223 1ST HOSP IP/OBS HIGH 75: CPT | Mod: AI | Performed by: PHYSICIAN ASSISTANT

## 2024-05-07 PROCEDURE — 81001 URINALYSIS AUTO W/SCOPE: CPT | Performed by: PHYSICIAN ASSISTANT

## 2024-05-07 PROCEDURE — 11042 DBRDMT SUBQ TIS 1ST 20SQCM/<: CPT | Performed by: PODIATRIST

## 2024-05-07 PROCEDURE — 87641 MR-STAPH DNA AMP PROBE: CPT | Performed by: PHYSICIAN ASSISTANT

## 2024-05-07 PROCEDURE — 250N000011 HC RX IP 250 OP 636: Performed by: PHYSICIAN ASSISTANT

## 2024-05-07 PROCEDURE — 87205 SMEAR GRAM STAIN: CPT | Performed by: PODIATRIST

## 2024-05-07 RX ORDER — DEXTROSE MONOHYDRATE 25 G/50ML
25-50 INJECTION, SOLUTION INTRAVENOUS
Status: CANCELLED | OUTPATIENT
Start: 2024-05-07

## 2024-05-07 RX ORDER — FAMOTIDINE 20 MG/1
20 TABLET, FILM COATED ORAL 2 TIMES DAILY PRN
Status: CANCELLED | OUTPATIENT
Start: 2024-05-07

## 2024-05-07 RX ORDER — NICOTINE POLACRILEX 4 MG
15-30 LOZENGE BUCCAL
Status: CANCELLED | OUTPATIENT
Start: 2024-05-07

## 2024-05-07 RX ORDER — METRONIDAZOLE 500 MG/100ML
500 INJECTION, SOLUTION INTRAVENOUS EVERY 8 HOURS
Status: DISCONTINUED | OUTPATIENT
Start: 2024-05-07 | End: 2024-05-08

## 2024-05-07 RX ORDER — ONDANSETRON 4 MG/1
4 TABLET, ORALLY DISINTEGRATING ORAL EVERY 6 HOURS PRN
Status: DISCONTINUED | OUTPATIENT
Start: 2024-05-07 | End: 2024-05-08

## 2024-05-07 RX ORDER — INSULIN LISPRO 100 [IU]/ML
INJECTION, SOLUTION INTRAVENOUS; SUBCUTANEOUS CONTINUOUS PRN
COMMUNITY

## 2024-05-07 RX ORDER — LEVOFLOXACIN 5 MG/ML
750 INJECTION, SOLUTION INTRAVENOUS EVERY 24 HOURS
Status: DISCONTINUED | OUTPATIENT
Start: 2024-05-07 | End: 2024-05-08

## 2024-05-07 RX ORDER — DEXTROSE MONOHYDRATE 25 G/50ML
25-50 INJECTION, SOLUTION INTRAVENOUS
Status: DISCONTINUED | OUTPATIENT
Start: 2024-05-07 | End: 2024-05-09 | Stop reason: HOSPADM

## 2024-05-07 RX ORDER — NICOTINE POLACRILEX 4 MG
15-30 LOZENGE BUCCAL
Status: DISCONTINUED | OUTPATIENT
Start: 2024-05-07 | End: 2024-05-09 | Stop reason: HOSPADM

## 2024-05-07 RX ORDER — LIDOCAINE 40 MG/G
CREAM TOPICAL
Status: DISCONTINUED | OUTPATIENT
Start: 2024-05-07 | End: 2024-05-09 | Stop reason: HOSPADM

## 2024-05-07 RX ORDER — IOPAMIDOL 755 MG/ML
100 INJECTION, SOLUTION INTRAVASCULAR ONCE
Status: COMPLETED | OUTPATIENT
Start: 2024-05-07 | End: 2024-05-07

## 2024-05-07 RX ADMIN — LEVOFLOXACIN 750 MG: 5 INJECTION, SOLUTION INTRAVENOUS at 23:11

## 2024-05-07 RX ADMIN — VANCOMYCIN HYDROCHLORIDE 2500 MG: 1 INJECTION, POWDER, LYOPHILIZED, FOR SOLUTION INTRAVENOUS at 23:10

## 2024-05-07 RX ADMIN — METRONIDAZOLE 500 MG: 500 INJECTION, SOLUTION INTRAVENOUS at 23:09

## 2024-05-07 RX ADMIN — SODIUM CHLORIDE 60 ML: 9 INJECTION, SOLUTION INTRAVENOUS at 22:48

## 2024-05-07 RX ADMIN — IOPAMIDOL 90 ML: 755 INJECTION, SOLUTION INTRAVENOUS at 22:47

## 2024-05-07 ASSESSMENT — ACTIVITIES OF DAILY LIVING (ADL)
ADLS_ACUITY_SCORE: 35

## 2024-05-07 NOTE — LETTER
5/7/2024         RE: Glenny Campos  [address]  [facility] [address]        Dear Colleague,    Thank you for referring your patient, Glenny Campos, to the [facility] [place]. Please see a copy of my visit note below.    Chief Complaint   Patient presents with    Follow Up             HPI: Glenny is a 58 year old female who presents today for further evaluation of left plantar foot ulcer and cellulitis. She also has an ulcer on the left plantar 3rd digit. She notes that these started about a week ago. She was standing for may hours at a horse show.       Review of Systems: No n/v/d/f/c/ns/sob/cp    PMH:   Past Medical History:   Diagnosis Date    Type 2 diabetes mellitus with diabetic polyneuropathy (H)        PSxH: No past surgical history on file.    Allergies: Cephalosporins, Piperacillin-tazobactam in dex, Statins, Cephalexin, Clindamycin, and Penicillins    SH:   Social History     Socioeconomic History    Marital status:      Spouse name: Not on file    Number of children: Not on file    Years of education: Not on file    Highest education level: Not on file   Occupational History    Not on file   Tobacco Use    Smoking status: Never    Smokeless tobacco: Never   Vaping Use    Vaping status: Never Used   Substance and Sexual Activity    Alcohol use: Not on file    Drug use: Not on file    Sexual activity: Not on file   Other Topics Concern    Not on file   Social History Narrative    Not on file     Social Determinants of Health     Financial Resource Strain: Not on file   Food Insecurity: Not on file   Transportation Needs: Not on file   Physical Activity: Not on file   Stress: Not on file   Social Connections: Not on file   Interpersonal Safety: Not on file   Housing Stability: Not on file       FH: No family history on file.    Objective:  97.4 P- 75 R- 22 BP - 123/64 97% O2    CRP Inflammation   Date Value Ref Range Status   05/07/2024 30.40 (H) <5.00  "mg/L Final     Erythrocyte Sedimentation Rate   Date Value Ref Range Status   05/07/2024 21 0 - 30 mm/hr Final     Lab Results   Component Value Date    WBC 8.7 05/07/2024     Lab Results   Component Value Date    RBC 4.34 05/07/2024     Lab Results   Component Value Date    HGB 13.6 05/07/2024     Lab Results   Component Value Date    HCT 38.7 05/07/2024     No components found for: \"MCT\"  Lab Results   Component Value Date    MCV 89 05/07/2024     Lab Results   Component Value Date    MCH 31.3 05/07/2024     Lab Results   Component Value Date    MCHC 35.1 05/07/2024     Lab Results   Component Value Date    RDW 12.1 05/07/2024     Lab Results   Component Value Date     05/07/2024     Last Comprehensive Metabolic Panel:  Sodium   Date Value Ref Range Status   05/07/2024 141 135 - 145 mmol/L Final     Comment:     Reference intervals for this test were updated on 09/26/2023 to more accurately reflect our healthy population. There may be differences in the flagging of prior results with similar values performed with this method. Interpretation of those prior results can be made in the context of the updated reference intervals.      Potassium   Date Value Ref Range Status   05/07/2024 4.4 3.4 - 5.3 mmol/L Final     Chloride   Date Value Ref Range Status   05/07/2024 109 (H) 98 - 107 mmol/L Final     Carbon Dioxide (CO2)   Date Value Ref Range Status   05/07/2024 22 22 - 29 mmol/L Final     Anion Gap   Date Value Ref Range Status   05/07/2024 10 7 - 15 mmol/L Final     Glucose   Date Value Ref Range Status   05/07/2024 142 (H) 70 - 99 mg/dL Final     GLUCOSE BY METER POCT   Date Value Ref Range Status   07/01/2023 82 70 - 99 mg/dL Final     Urea Nitrogen   Date Value Ref Range Status   05/07/2024 8.6 6.0 - 20.0 mg/dL Final     Creatinine   Date Value Ref Range Status   05/07/2024 0.60 0.51 - 0.95 mg/dL Final     GFR Estimate   Date Value Ref Range Status   05/07/2024 >90 >60 mL/min/1.73m2 Final     Calcium "   Date Value Ref Range Status   05/07/2024 9.0 8.6 - 10.0 mg/dL Final     Bilirubin Total   Date Value Ref Range Status   05/07/2024 0.3 <=1.2 mg/dL Final     Alkaline Phosphatase   Date Value Ref Range Status   05/07/2024 92 40 - 150 U/L Final     Comment:     Reference intervals for this test were updated on 11/14/2023 to more accurately reflect our healthy population. There may be differences in the flagging of prior results with similar values performed with this method. Interpretation of those prior results can be made in the context of the updated reference intervals.     ALT   Date Value Ref Range Status   05/07/2024 12 0 - 50 U/L Final     Comment:     Reference intervals for this test were updated on 6/12/2023 to more accurately reflect our healthy population. There may be differences in the flagging of prior results with similar values performed with this method. Interpretation of those prior results can be made in the context of the updated reference intervals.       AST   Date Value Ref Range Status   05/07/2024 22 0 - 45 U/L Final     Comment:     Reference intervals for this test were updated on 6/12/2023 to more accurately reflect our healthy population. There may be differences in the flagging of prior results with similar values performed with this method. Interpretation of those prior results can be made in the context of the updated reference intervals.                   Hgb A1C  Order: [id number]  Component  Ref Range & Units 1 mo ago   Hemoglobin A1C (Rapid)  <=5.6 % 8.5 High    Performing Location [facility]   Estimated Average Glucose (Calc)  < 117 mg/dL 197   Comment: Estimated average glucose (eAG) converts A1c into glucose units (mg/dL) and estimates average glucose over the past approximately 3 months.  The eAG reference interval (<117 mg/dL) corresponds to an A1c of <5.7%.   Resulting Agency [facility]   Narrative  Performed by [facility]  For patients not  previously diagnosed with diabetes:    DP and PT pulses 2/4. CRT 1 second. Diminished pedal hair.  Gross sensation is severely diminished.   Amp of the left hallux and partial 2nd digit. Bullae noted on the plantar left foot. THis was deroofed and te following wound was noted:    A wound is noted at left  plantar foot measuring 3cm x 5cm x 0.1cm.    Ross Classification: 2    Wound base: Red  White/Granulation  maceration    Edges: intact    Drainage: small/serous and purulent    Odor: no    Undermining: no    Bone Exposure: No    Clinical Signs of Infection: Yes: surrounding cellulitis    After obtaining patient consent, the wound was irrigated with copious amounts of saline. A scalpel was then used to debride the wound into subcutaneous tissue. The wound edges were debrided back to healthy, bleeding tissue. The wound base exhibited healthy bleeding. Given the patient's lack of sensation, no anesthesia was necessary for the procedure.  __________________  Wound #2    A wound is noted at right  planar 3rd digit measuring 1cm x 0.7cm x 0.1cm.    Ross Classification: 2    Wound base: Red/Granulation    Edges: hyperkeratotic     Drainage: scant/serous    Odor: no    Undermining: no    Bone Exposure: No    Clinical Signs of Infection: Yes: cellulitis of the toe.     After obtaining patient consent, the wound was irrigated with copious amounts of saline. A scalpel was then used to debride the wound into subcutaneous tissue. The wound edges were debrided back to healthy, bleeding tissue. The wound base exhibited healthy bleeding. Given the patient's lack of sensation, no anesthesia was necessary for the procedure.    Barriers to Healing: Both wounds are in areas of high pressure. High A1c. Previous delayed wound healing and amputations.     Treatment Plan: Admission with IV abx.     Pt Ability to Follow Plan: moderate.         Assessment: Glenny is a 59 YO with left plantar foot ulceration and right digital  amputation. She has a hx of delayed wound healing. At the last admission that I saw her, she did leave early 2/2 to a conference that she went to. She is at high risk for further amputation, however her A1c is high and this poses issues with wound closure. I would recommend a left foot MRI to r/o any abscess in the foot with IV abx.     Plan:  - Pt seen and evaluated.  - XRs taken and independently interpreted by myself. Discussed with pt.   - I called the placement line for direct admit. She will be admitted to the [facility].   - Wounds debrided as described.   - Areas covered.  - Recommending IV abx and possible MRI, if the cellulitis does not begin to resolve with abx tx.    - She will need follow up as outpatient. Will try to get her scheduled with Dr. Calles or Garcia upon discharge.   On the DOS, I spent 60 minutes with documentation, chart/image review, and care coordination that was outside of the procedure.            Tae Chavira DPM

## 2024-05-07 NOTE — H&P
[facility]    History and Physical - Hospitalist Service, GOLD TEAM        Date of Admission:  5/7/2024    Assessment & Plan     Glenny Campos is a 58F w/ PMH history of cellulitis, history of osteomyelitis of the left third toe, IDDM, history of DKA admitted on 5/7/2024 with an infected left plantar 3rd digit ulcer.     Infected plantar left 3rd toe ulcer  WBC is normal at 8.7.  X-ray of the right foot shows subtle erosive changes of the distal tuft of the right third toe.  Left foot shows amputation of the distal first metatarsal and great toe with partial amputation of the left second toe.  Cultures taken from wound site per podiatry.   - Abx:   - pharmacy to dose vanco   - flagyl 500mg q8h IV   - Levaquin 750mg IV qd  - infectious workup:   - blood cx x 2   - UA UC   - MRSA nares   - CXR   - CT tib/fib and foot on left    -  wound cultures 5/7 obtained per ortho/podiatry   - CRP 30.4, trend  -WOCN Consulted   - ortho/podiatry consult  - ID consult   - will likely need MRI if not clinically improving     IDDM type II with history of DKA (A1C 8.5 on 3/28/24)  DM neuropathy  PTA on insulin pump with humalog 1.5 units/h 00:00-10am, 1.15 units/h 10:00-18:00, 2 units/h 18:00-12am. Glucose controlled here and per patient's CGM  -Consulted endocrinology  -Check glucose ACHS, hypoglycemia protocol  - Continue current insulin pump with patient's PTA settings at this time  - low threshold to stop pump and transition to insulin drip if refractory hyperglycemia            Diet:  moderate CHO, n.p.o. P midnight  DVT Prophylaxis: Ambulate every shift, consider chemoprophylaxis if no procedures planned  Brenner Catheter: Not present  Lines: None     Cardiac Monitoring: None  Code Status:  Full    Clinically Significant Risk Factors Present on Admission                                  Disposition Plan     Medically Ready for Discharge: Anticipated in 2-4 Days         The patient's  care was discussed with the Attending Physician, Dr. Sunshine, Bedside Nurse, Patient, and Patient's Family.    Wendy Forbes PA-C  Hospitalist Service, [facility]  Securely message with [employer] (more info)  Text page via [facility] Paging/Directory   See signed in provider for up to date coverage information    ______________________________________________________________________    Chief Complaint   infection of the left foot    History is obtained from the patient, patient's chart, patient's family    History of Present Illness   Glenny Campos is a 58F w/ PMH history of cellulitis, history of osteomyelitis of the left third toe, IDDM, history of DKA admitted on 5/7/2024 with an infected left plantar 3rd digit ulcer.           Past Medical History    Past Medical History:   Diagnosis Date    Type 2 diabetes mellitus with diabetic polyneuropathy (H)        Past Surgical History   amputation of toes of the left foot  Catarract (2013)    Prior to Admission Medications   Prior to Admission Medications   Prescriptions Last Dose Informant Patient Reported? Taking?   Continuous Blood Gluc Sensor (FREESTYLE HIRAL 3 SENSOR) MISC   Yes No   Insulin Disposable Pump (OMNIPOD DASH PODS, GEN 4,) MISC   Yes No   famotidine (PEPCID) 20 MG tablet   No No   Sig: Take 1 tablet (20 mg) by mouth 2 times daily as needed (GERD or refractory nausea)   Patient not taking: Reported on 8/24/2023   ondansetron (ZOFRAN ODT) 4 MG ODT tab   No No   Sig: Take 1 tablet (4 mg) by mouth every 6 hours as needed for nausea or vomiting   Patient not taking: Reported on 8/24/2023      Facility-Administered Medications: None        Review of Systems    The 10 point Review of Systems is negative other than noted in the HPI or here.     Social History   I have reviewed this patient's social history and updated it with pertinent information if needed.  Social History     Tobacco Use    Smoking  status: Never    Smokeless tobacco: Never   Vaping Use    Vaping status: Never Used   Lives home w/ family      Allergies   Allergies   Allergen Reactions    Piperacillin-Tazobactam In Dex Hives    Cephalosporins      Other reaction(s): *Unknown    Statins Muscle Pain (Myalgia)    Cephalexin Rash    Clindamycin Rash     possible rash, was on zosyn at the same time      Penicillins Hives and Rash        Physical Exam   Vital Signs: Temp: 98  F (36.7  C)   BP: (!) 156/57 Pulse: 77     SpO2: 100 % O2 Device: None (Room air)    Weight: 0 lbs 0 oz  GENERAL: Alert and oriented x 3. NAD. Able to sit at the edge of the bed and reposition independently. Cooperative.   HEENT: Anicteric sclera. Mucous membranes moist. Throat clear. No tonsillar exudates. Neck supple. NC. AT. EOMI. Pupils equal and round  CV: RRR. S1, S2. No murmurs appreciated.   RESPIRATORY: Effort normal on RA. Lungs diminished bases, CTAB with no wheezing, rales, rhonchi.   GI: Abdomen soft, NT, NABS, obese  NEUROLOGICAL: No focal deficits. Moves all extremities.  CN 2-12 grossly intact.  EXTREMITIES: (see images in media and below) +circumfirential induration, blanchable erythema and edema of the left lower leg.  L>R peripheral edema. Intact bilateral pedal pulses.   SKIN: No jaundice. No other rashes.  BACK: no discrete CVA tenderness, no spinous tenderness, no lesions       Medical Decision Making       80 MINUTES SPENT BY ME on the date of service doing chart review, history, exam, documentation & further activities per the note.      Data     I have personally reviewed the following data over the past 24 hrs:    8.7  \   13.6   / 232     141 109 (H) 8.6 /  142 (H)   4.4 22 0.60 \     ALT: 12 AST: 22 AP: 92 TBILI: 0.3   ALB: 4.1 TOT PROTEIN: 7.0 LIPASE: N/A     Procal: N/A CRP: 30.40 (H) Lactic Acid: N/A

## 2024-05-07 NOTE — CARE PLAN
Transfer Type: [facility]  Transfer Triage Note    Date of call: 05/07/24  Time of call: 2:09 PM    Current Patient Location:  [facility]  Current Level of Care: Outpatient    Diagnosis: Cellulitis  Reason for requested transfer: Further diagnostic work up, management, and consultation for specialized care   Isolation Needs: None    Care everywhere has been updated and reviewed: Yes  Necessary images have been sent through PACS: Yes    If patient is transferring for specialty care or specific procedure, the specialist required has participated in the transfer call and agreed with need for transfer and anticipated timeline: Yes, Provider name: Dr. Wilde specialty with: Podiatry    Transfer accepted: Yes  Stability of Patient: Patient is vitally stable, with no critical labs, and will likely remain stable throughout the transfer process  Is the patient appropriate for [facility]? Yes  Level of Care Needed: Med Surg  Telemetry Needed:  None  Expected Time of Arrival for Transfer: 0-8 hours  Arrival Location:  [facility]     Recommendations for Management and Stabilization: Not needed    Additional Comments:     59 yo F with pmh of type 2 diabetes mellitus who presents from Dr. Chavira with cellulitis and plantar foot wound (4 cm x 4 cm). She also has contralateral wound on 3rd digit on right foot. Dr. Wilde concern for cellulitis and need for IV antibiotics. Patient with normal vital signs and non toxic appearance in clinic. She will obtain CBC, Cmp, ESR, CRP prior to admission. Plan to admit to [facility] med surg unit for cellulitis treatment.     Souleymane Mosher MD

## 2024-05-07 NOTE — PROGRESS NOTES
Chief Complaint   Patient presents with    Follow Up             HPI: Glenny is a 58 year old female who presents today for further evaluation of left plantar foot ulcer and cellulitis. She also has an ulcer on the left plantar 3rd digit. She notes that these started about a week ago. She was standing for may hours at a horse show.       Review of Systems: No n/v/d/f/c/ns/sob/cp    PMH:   Past Medical History:   Diagnosis Date    Type 2 diabetes mellitus with diabetic polyneuropathy (H)        PSxH: No past surgical history on file.    Allergies: Cephalosporins, Piperacillin-tazobactam in dex, Statins, Cephalexin, Clindamycin, and Penicillins    SH:   Social History     Socioeconomic History    Marital status:      Spouse name: Not on file    Number of children: Not on file    Years of education: Not on file    Highest education level: Not on file   Occupational History    Not on file   Tobacco Use    Smoking status: Never    Smokeless tobacco: Never   Vaping Use    Vaping status: Never Used   Substance and Sexual Activity    Alcohol use: Not on file    Drug use: Not on file    Sexual activity: Not on file   Other Topics Concern    Not on file   Social History Narrative    Not on file     Social Determinants of Health     Financial Resource Strain: Not on file   Food Insecurity: Not on file   Transportation Needs: Not on file   Physical Activity: Not on file   Stress: Not on file   Social Connections: Not on file   Interpersonal Safety: Not on file   Housing Stability: Not on file       FH: No family history on file.    Objective:  97.4 P- 75 R- 22 BP - 123/64 97% O2    CRP Inflammation   Date Value Ref Range Status   05/07/2024 30.40 (H) <5.00 mg/L Final     Erythrocyte Sedimentation Rate   Date Value Ref Range Status   05/07/2024 21 0 - 30 mm/hr Final     Lab Results   Component Value Date    WBC 8.7 05/07/2024     Lab Results   Component Value Date    RBC 4.34 05/07/2024     Lab Results   Component Value  "Date    HGB 13.6 05/07/2024     Lab Results   Component Value Date    HCT 38.7 05/07/2024     No components found for: \"MCT\"  Lab Results   Component Value Date    MCV 89 05/07/2024     Lab Results   Component Value Date    MCH 31.3 05/07/2024     Lab Results   Component Value Date    MCHC 35.1 05/07/2024     Lab Results   Component Value Date    RDW 12.1 05/07/2024     Lab Results   Component Value Date     05/07/2024     Last Comprehensive Metabolic Panel:  Sodium   Date Value Ref Range Status   05/07/2024 141 135 - 145 mmol/L Final     Comment:     Reference intervals for this test were updated on 09/26/2023 to more accurately reflect our healthy population. There may be differences in the flagging of prior results with similar values performed with this method. Interpretation of those prior results can be made in the context of the updated reference intervals.      Potassium   Date Value Ref Range Status   05/07/2024 4.4 3.4 - 5.3 mmol/L Final     Chloride   Date Value Ref Range Status   05/07/2024 109 (H) 98 - 107 mmol/L Final     Carbon Dioxide (CO2)   Date Value Ref Range Status   05/07/2024 22 22 - 29 mmol/L Final     Anion Gap   Date Value Ref Range Status   05/07/2024 10 7 - 15 mmol/L Final     Glucose   Date Value Ref Range Status   05/07/2024 142 (H) 70 - 99 mg/dL Final     GLUCOSE BY METER POCT   Date Value Ref Range Status   07/01/2023 82 70 - 99 mg/dL Final     Urea Nitrogen   Date Value Ref Range Status   05/07/2024 8.6 6.0 - 20.0 mg/dL Final     Creatinine   Date Value Ref Range Status   05/07/2024 0.60 0.51 - 0.95 mg/dL Final     GFR Estimate   Date Value Ref Range Status   05/07/2024 >90 >60 mL/min/1.73m2 Final     Calcium   Date Value Ref Range Status   05/07/2024 9.0 8.6 - 10.0 mg/dL Final     Bilirubin Total   Date Value Ref Range Status   05/07/2024 0.3 <=1.2 mg/dL Final     Alkaline Phosphatase   Date Value Ref Range Status   05/07/2024 92 40 - 150 U/L Final     Comment:     Reference " intervals for this test were updated on 11/14/2023 to more accurately reflect our healthy population. There may be differences in the flagging of prior results with similar values performed with this method. Interpretation of those prior results can be made in the context of the updated reference intervals.     ALT   Date Value Ref Range Status   05/07/2024 12 0 - 50 U/L Final     Comment:     Reference intervals for this test were updated on 6/12/2023 to more accurately reflect our healthy population. There may be differences in the flagging of prior results with similar values performed with this method. Interpretation of those prior results can be made in the context of the updated reference intervals.       AST   Date Value Ref Range Status   05/07/2024 22 0 - 45 U/L Final     Comment:     Reference intervals for this test were updated on 6/12/2023 to more accurately reflect our healthy population. There may be differences in the flagging of prior results with similar values performed with this method. Interpretation of those prior results can be made in the context of the updated reference intervals.                   Hgb A1C  Order: [id number]  Component  Ref Range & Units 1 mo ago   Hemoglobin A1C (Rapid)  <=5.6 % 8.5 High    Performing Location [facility]   Estimated Average Glucose (Calc)  < 117 mg/dL 197   Comment: Estimated average glucose (eAG) converts A1c into glucose units (mg/dL) and estimates average glucose over the past approximately 3 months.  The eAG reference interval (<117 mg/dL) corresponds to an A1c of <5.7%.   Resulting Agency [facility]   Narrative  Performed by [facility]  For patients not previously diagnosed with diabetes:    DP and PT pulses 2/4. CRT 1 second. Diminished pedal hair.  Gross sensation is severely diminished.   Amp of the left hallux and partial 2nd digit. Bullae noted on the plantar left foot. THis was deroofed and te following wound was  noted:    A wound is noted at left  plantar foot measuring 3cm x 5cm x 0.1cm.    Ross Classification: 2    Wound base: Red  White/Granulation  maceration    Edges: intact    Drainage: small/serous and purulent    Odor: no    Undermining: no    Bone Exposure: No    Clinical Signs of Infection: Yes: surrounding cellulitis    After obtaining patient consent, the wound was irrigated with copious amounts of saline. A scalpel was then used to debride the wound into subcutaneous tissue. The wound edges were debrided back to healthy, bleeding tissue. The wound base exhibited healthy bleeding. Given the patient's lack of sensation, no anesthesia was necessary for the procedure.  __________________  Wound #2    A wound is noted at right  planar 3rd digit measuring 1cm x 0.7cm x 0.1cm.    Ross Classification: 2    Wound base: Red/Granulation    Edges: hyperkeratotic     Drainage: scant/serous    Odor: no    Undermining: no    Bone Exposure: No    Clinical Signs of Infection: Yes: cellulitis of the toe.     After obtaining patient consent, the wound was irrigated with copious amounts of saline. A scalpel was then used to debride the wound into subcutaneous tissue. The wound edges were debrided back to healthy, bleeding tissue. The wound base exhibited healthy bleeding. Given the patient's lack of sensation, no anesthesia was necessary for the procedure.    Barriers to Healing: Both wounds are in areas of high pressure. High A1c. Previous delayed wound healing and amputations.     Treatment Plan: Admission with IV abx.     Pt Ability to Follow Plan: moderate.         Assessment: Glenny is a 57 YO with left plantar foot ulceration and right digital amputation. She has a hx of delayed wound healing. At the last admission that I saw her, she did leave early 2/2 to a conference that she went to. She is at high risk for further amputation, however her A1c is high and this poses issues with wound closure. I would recommend a  left foot MRI to r/o any abscess in the foot with IV abx.     Plan:  - Pt seen and evaluated.  - XRs taken and independently interpreted by myself. Discussed with pt.   - I called the placement line for direct admit. She will be admitted to the [facility].   - Wounds debrided as described.   - Areas covered.  - Recommending IV abx and possible MRI, if the cellulitis does not begin to resolve with abx tx.    - She will need follow up as outpatient. Will try to get her scheduled with Dr. Calles or Garcia upon discharge.   On the DOS, I spent 60 minutes with documentation, chart/image review, and care coordination that was outside of the procedure.

## 2024-05-08 PROBLEM — S91.339A INFECTED PUNCTURE WOUND OF PLANTAR ASPECT OF FOOT: Status: ACTIVE | Noted: 2024-05-08

## 2024-05-08 PROBLEM — L08.9 INFECTED PUNCTURE WOUND OF PLANTAR ASPECT OF FOOT: Status: ACTIVE | Noted: 2024-05-08

## 2024-05-08 LAB
CREAT SERPL-MCNC: 0.56 MG/DL (ref 0.51–0.95)
EGFRCR SERPLBLD CKD-EPI 2021: >90 ML/MIN/1.73M2
GLUCOSE BLDC GLUCOMTR-MCNC: 105 MG/DL (ref 70–99)
GLUCOSE BLDC GLUCOMTR-MCNC: 111 MG/DL (ref 70–99)
GLUCOSE BLDC GLUCOMTR-MCNC: 144 MG/DL (ref 70–99)
GLUCOSE BLDC GLUCOMTR-MCNC: 148 MG/DL (ref 70–99)

## 2024-05-08 PROCEDURE — 258N000003 HC RX IP 258 OP 636: Performed by: STUDENT IN AN ORGANIZED HEALTH CARE EDUCATION/TRAINING PROGRAM

## 2024-05-08 PROCEDURE — 250N000011 HC RX IP 250 OP 636: Mod: JZ | Performed by: PHYSICIAN ASSISTANT

## 2024-05-08 PROCEDURE — 82565 ASSAY OF CREATININE: CPT | Performed by: STUDENT IN AN ORGANIZED HEALTH CARE EDUCATION/TRAINING PROGRAM

## 2024-05-08 PROCEDURE — 97602 WOUND(S) CARE NON-SELECTIVE: CPT

## 2024-05-08 PROCEDURE — G0463 HOSPITAL OUTPT CLINIC VISIT: HCPCS | Mod: 25

## 2024-05-08 PROCEDURE — 36415 COLL VENOUS BLD VENIPUNCTURE: CPT | Performed by: STUDENT IN AN ORGANIZED HEALTH CARE EDUCATION/TRAINING PROGRAM

## 2024-05-08 PROCEDURE — 99232 SBSQ HOSP IP/OBS MODERATE 35: CPT | Performed by: STUDENT IN AN ORGANIZED HEALTH CARE EDUCATION/TRAINING PROGRAM

## 2024-05-08 PROCEDURE — 82962 GLUCOSE BLOOD TEST: CPT

## 2024-05-08 PROCEDURE — 99223 1ST HOSP IP/OBS HIGH 75: CPT | Performed by: PHYSICIAN ASSISTANT

## 2024-05-08 PROCEDURE — 250N000011 HC RX IP 250 OP 636: Performed by: STUDENT IN AN ORGANIZED HEALTH CARE EDUCATION/TRAINING PROGRAM

## 2024-05-08 PROCEDURE — 120N000002 HC R&B MED SURG/OB UMMC

## 2024-05-08 PROCEDURE — 250N000013 HC RX MED GY IP 250 OP 250 PS 637: Performed by: STUDENT IN AN ORGANIZED HEALTH CARE EDUCATION/TRAINING PROGRAM

## 2024-05-08 PROCEDURE — 99222 1ST HOSP IP/OBS MODERATE 55: CPT | Performed by: PHYSICIAN ASSISTANT

## 2024-05-08 RX ORDER — LEVOFLOXACIN 750 MG/1
750 TABLET, FILM COATED ORAL EVERY 24 HOURS
Status: DISCONTINUED | OUTPATIENT
Start: 2024-05-08 | End: 2024-05-09 | Stop reason: HOSPADM

## 2024-05-08 RX ORDER — ONDANSETRON 2 MG/ML
4 INJECTION INTRAMUSCULAR; INTRAVENOUS EVERY 6 HOURS PRN
Status: DISCONTINUED | OUTPATIENT
Start: 2024-05-08 | End: 2024-05-09 | Stop reason: HOSPADM

## 2024-05-08 RX ORDER — SODIUM CHLORIDE 9 MG/ML
INJECTION, SOLUTION INTRAVENOUS
Status: COMPLETED
Start: 2024-05-08 | End: 2024-05-08

## 2024-05-08 RX ORDER — METRONIDAZOLE 500 MG/1
500 TABLET ORAL 3 TIMES DAILY
Status: DISCONTINUED | OUTPATIENT
Start: 2024-05-08 | End: 2024-05-09

## 2024-05-08 RX ORDER — ONDANSETRON 4 MG/1
4 TABLET, ORALLY DISINTEGRATING ORAL EVERY 6 HOURS PRN
Status: DISCONTINUED | OUTPATIENT
Start: 2024-05-08 | End: 2024-05-09 | Stop reason: HOSPADM

## 2024-05-08 RX ADMIN — SODIUM CHLORIDE 500 ML: 9 INJECTION, SOLUTION INTRAVENOUS at 08:42

## 2024-05-08 RX ADMIN — LEVOFLOXACIN 750 MG: 750 TABLET, FILM COATED ORAL at 19:53

## 2024-05-08 RX ADMIN — METRONIDAZOLE 500 MG: 500 TABLET ORAL at 15:32

## 2024-05-08 RX ADMIN — VANCOMYCIN HYDROCHLORIDE 1500 MG: 10 INJECTION, POWDER, LYOPHILIZED, FOR SOLUTION INTRAVENOUS at 11:30

## 2024-05-08 RX ADMIN — METRONIDAZOLE 500 MG: 500 TABLET ORAL at 22:00

## 2024-05-08 RX ADMIN — METRONIDAZOLE 500 MG: 500 INJECTION, SOLUTION INTRAVENOUS at 08:37

## 2024-05-08 ASSESSMENT — ACTIVITIES OF DAILY LIVING (ADL)
ADLS_ACUITY_SCORE: 38
ADLS_ACUITY_SCORE: 35
ADLS_ACUITY_SCORE: 38

## 2024-05-08 NOTE — PLAN OF CARE
Goal Outcome Evaluation:      Plan of Care Reviewed With: patient    Overall Patient Progress: no changeOverall Patient Progress: no change       VS: Temp: 98.3  F (36.8  C) Temp src: Oral BP: (!) 147/54 Pulse: 78   Resp: 16 SpO2: 96 % O2 Device: None (Room air)      O2: Stable On room air. Denies chest pan and SOB.    Output: Cont of B&B.    Last BM: 5/7/2024.    Activity: Independent in room uses walker.   Skin: Redness and edema on BLE and cellulitis. Left Plantar foot ulcer,  left foot amputation first distal and great toe.   Pain: Denies pain.   Neuro: A&Ox4. Denies numbness and tingling in all extremities   Dressing: Mepilex dressings.   Diet: Regular diet.   LDA: YUMIKO HERNANDEZ.   Equipment: Call light within reach, Personal items, IV poles.   Plan: Continue POC. IV antibiotics.   Additional Info: Pt experienced nausea, vomiting and threw up twice this shift. Pt  refused Zofran. Pt mentioned that she experienced this symptoms with the IV antibiotic before and requested to stop the vancomycin before its finished. Provider was notified.

## 2024-05-08 NOTE — CONSULTS
Inpatient Diabetes Management Service : New Consult Note     Patient: Glenny Campos   YOB: 1965    MRN: [id number]     Date of Consult : 05/08/2024   Date of Admission: 5/7/2024     Reason for Consult: insulin pump management    Consult Requestor: Wendy Forbes PA-C            History of Present Illness:     Glenny Campos is a 57 year old female with past medical history of Type 2 diabetes mellitus (on insulin pump ) (complicated by retinopathy, neuropathy, prior left second toe amputations), obesity, HLD, biliary colic, sepsis secondary to dental infection, sinusitis and facial cellulitis (right sided).She was again  admitted on 5/7/2024 with an infected left plantar foot and right plantar 3rd digit ulcer.      History obtained via the patient, chart review and discussion with .         Diabetes Focus:  Patient is  known to the Inpatient Diabetes Service from past admission(s) on 6/23/2023= with facial cellulitis post dental procedure. She was found to have impending DKA at that time.  She was placed on an insulin drip and discharged on subcutaneous insulin.  She is admitted with a left foot infection.  She says no lows prior to admission bg have been running  mid 200's. She says she has been eating more.  When asked if she is bolus for all her meals she got quiet.  Her  chimes in that she has to watch what she eats.  She says she knows that she was having some issues before.   Last saw her endocrinologist on 3/28/2024 and she is recommending switch to Switch pump to a closed loop system - Omnipod 5 with DexCom G- She says she will see educator at the end of the month.  Glenny has not eaten much since admission.  Her BG are running lower.  BG . She agrees to bolus for her carbs.  She is having some left foot pain, it has always been numb.  She is frustrated that she is again on IV antibiotics, given at the same time and she had a reaction to them.  Primary is  aware.    Current inpatient regimen:      Basal 2   reverse correction is on  00:00-10:00 am-->  1.5  10:01 am to 17:59-->  1.15  18:00 to 23:59-->  2  Total basal=36.2  ISF=1:35  Insulin to carb ratio: 1:8   Target Glucose=95 correct above 110  Total Gluc 3 hours    BG at time of consult: 144    Other Active/Contributory Medical Problems:     infected left plantar foot and right plantar 3rd digit ulcer  Planned Procedures/Surgeries: none right now to be seen by ortho    Relevant Labs on Admission:    Renal function: Creatinine (0.60), eGFR (>90)    BMP: Na (141), K (4.4) Bicarb (22), Anion Gap (10)   BhB: no   Hgb: 13.6   A1c: 7.7   Lactic Acid: none   I    GAD65 antibody, islet antibody, insulin antibody, and c-peptide  not available on epic search     Inpatient Glucose Trends:           Diabetes History:   Diabetes Type and Duration: Diabetes Mellitus Type: 2  Duration:    Diagnosed in 1997    PTA Medication Regimen:        Basal 2   reverse correction is on  00:00-10:00 am-->  1.5  10:01 am to 17:59-->  1.15  18:00 to 23:59-->  2  Total basal=36.2  ISF=1:35  Insulin to carb ratio: 1:8   Target Glucose=95 correct above 110  Total Gluc 3 hours     Historical Meds:  Metformin - did not tolerate   Not interested in GLP-1 agonist     Glucose monitoring device and frequency: Gerard 3- checks multiple times daily  Outpatient Diabetes Provider: Lori Leone MD     ------------------------  Complications:  partial left 2nd toe amputation  With infection of  Third toe, diabetic retinopathy, diabetic neuropathy    Last A1c: 7.7 on 6/22/2024    Hemoglobin at time of last A1c: 13.6  RBC transfusion in past 3 months:  none      History of DKA: 6/23/2023:  Pending diabetic ketoacidosis on this admission  VBG ph=7.37, AG=20 and Co2=15   Hypoglycemia PTA: no  -    Diet/Lifestyle: about 2 weeks ago she was eating secondary to stress issues going on     Recent weight change:  no  Ability to [place] Prescribed Regimen:  yes    Food/Housing Insecurity: no     Amb pump:      Pump type: Omnipod Dash    Pump insulin: lispro      Basal 2   reverse correction is on  00:00-10:00 am-->  1.5  10:01 am to 17:59-->  1.15  18:00 to 23:59-->  2  Total basal=36.2  ISF=1:35  Insulin to carb ratio: 1:8   Target Glucose=95 correct above 110  Total Gluc 3 hours          Medications Impacting Glycemia:    Steroids: none  D5W containing solutions/medications: none  Other medications impacting glucose:  none         Diet/Nutrition:    Orders Placed This Encounter      Consistent Carbohydrate Diet Moderate Consistent Carb (60 g CHO per Meal) Diet     Supplements:  none  TF: none  TPN: none          Review of Systems:    CC:   Constitutional: no fever and chills.   HEENT: no headache, vision changes, hearing changes, sinus congestion, and swollen glands.   Cardiac: no chest pain, palpitations, or racing heart.    Respiratory:  no  cough,   GI: no abdominal pain, tenderness and bloating. yes nausea and vomiting related to getting all IV antibiotics at the same time. no changes in stool pattern, constipation and diarrhea.    : no difficulty urinating, dysuria, and incontinence.    Endocrine: no polyuria, polydipsia           Past Medical History:       Past Medical History:   Diagnosis Date    Diabetic foot ulcer (H)     Osteomyelitis (H)     Type 2 diabetes mellitus with diabetic polyneuropathy (H)     Amputation of left great toe ([facility]) 10/21/2020   Background diabetic retinopathy(362.01) 2/19/2010   Chicken pox   Cortical senile cataract LT>RT 10/31/2013   Dermatophytosis of foot 2/18/2015   Diabetes ([facility])   Diabetes mellitus, type 1 ([facility]) 1997   dx age 31   Diabetes, neuropathy   Diabetes, retinopathy   Diabetic neuropathy ([facility])   ICD 10   Diabetic polyneuropathy ([facility]) 10/9/2013   Per documentation 10-10-12, Chip Ye   Diabetic ulcer of toe of left foot associated with type 1 diabetes mellitus, limited to breakdown of skin ([facility]) 4/26/2017   Essential  hypertension 2015   FHx: glaucoma 3/12/2009   Gall stone   Hammer toes of both feet 2016   Hypercholesterolemia   Keloid scar 2015   Pancreatic pseudocyst 1996   infected/drained   Pancreatitis    ? GB disease   Pes planus of both feet 2016   Plantar fasciitis, right 3/29/2018   Scarring, keloid 2009   Tinea pedis of both feet 2016          Past Surgical History:      Past Surgical History:   Procedure Laterality Date    Amputation hallux Left     APPENDECTOMY  1992     SECTION      ,              Social History:      Social History     Tobacco Use    Smoking status: Never    Smokeless tobacco: Never   Substance Use Topics    Alcohol use: Not on file        History   Sexual Activity    Sexual activity: Not on file        Tobacco: never    Etoh: 1-2 times per year    Other Substance: none    Marital Status:  with 2 boys     Lives with  in [place], [place]           Family History:    Reviewed and non-contributory.    Family History of Diabetes: birth mother type 2 diabetes    Family History   Problem Relation Age of Onset    Colon Cancer Mother     Glaucoma Mother     Diabetes Type 2  Mother     Dementia Father     Heart Failure Maternal Grandmother     Cerebrovascular Disease Maternal Grandfather              Physical Exam:    BP (!) 143/53 (BP Location: Left arm)   Pulse 67   Temp 98.3  F (36.8  C) (Oral)   Resp 18   Wt 141.3 kg (311 lb 9.6 oz)   SpO2 94%   BMI 44.71 kg/m     General: ill appearing, in no acute distress.  HEENT:  NC/AT. MMM, sclera not injected  Lungs:  unremarkable, no audible cough, no work of breathing, remains on room air  ABD:  rounded, soft, non-tender, pod on right abdomen  Skin:  warm and dry, no obvious lesions  MSK:   moving all extremities, reanna on left arm  LLE with erythema of shin- see picture of left plantar foot and right plantar third digit on 2024  Lymp:   yes LE edema  Mental Status:  Alert and oriented  x3  Psych:   Cooperative, good eye contact, full/appropriate affect  Feet:              Laboratory Data:      Lab Results   Component Value Date    A1C 7.7 05/07/2024    A1C 6.8 06/22/2023     Recent Labs   Lab Test 05/07/24  1506   WBC 8.7   RBC 4.34   HGB 13.6   HCT 38.7   MCV 89   MCH 31.3   MCHC 35.1   RDW 12.1        Recent Labs   Lab Test 05/08/24  1139 05/08/24  0808 05/08/24  0649 05/07/24  2331 05/07/24  1506 07/01/23  0543   NA  --   --   --   --  141 149*   POTASSIUM  --   --   --   --  4.4 3.4   CHLORIDE  --   --   --   --  109* 111*   CO2  --   --   --   --  22 24   ANIONGAP  --   --   --   --  10 14   * 144*  --    < > 142* 83   BUN  --   --   --   --  8.6 6.5   CR  --   --  0.56  --  0.60 0.56   JOSE CARLOS  --   --   --   --  9.0 9.0    < > = values in this interval not displayed.     Recent Labs   Lab Test 05/07/24  1506   PROTTOTAL 7.0   ALBUMIN 4.1   BILITOTAL 0.3   ALKPHOS 92   AST 22   ALT 12            Assessment and Recommendations:       Assessment:   1.  Type 2 diabetes, suboptimally controlled with retinopathy, amputation, neuropathy  2. Elevated BMI  3.Infected plantar ulcer on left foot and Right 3rd digit ulcer with surrounding cellulitis     Plan/Recommendations:    Patient is able to manage her pump inpatient.  Continue pump settings in Basal 2    Basal 2   reverse correction is on  00:00-10:00 am-->  1.5  10:01 am to 17:59-->  1.15  18:00 to 23:59-->  2  Total basal=36.2  ISF=1:35  Insulin to carb ratio: 1:8   Target Glucose=95 correct above 110  Total Gluc 3 hours     .BG from continuous glucose monitor cannot be used for clinical decision making.  Gerard CGM can be left in place  - BG monitoring: TID AC, HS, 0200  Hypoglycemia protocol    - Carb counting protocol     Discharge Planning: (tentative)    Medications: TBD    Test Claims:  none needed.   Education:  Needs to be assessed closer to discharge.    Outpatient Follow-up:    Lori Leone MD   Department of Diabetes and  Endocrinology   [facility]   Discussion:  Patient is ok to manage her insulin pump.   is to bring replacement pods and gerard tomorrow.  Pump needs to be changed on Friday she says.  Gerard to be replaced in 5 days. No changes to settings.  Patient reassured that she can continue to use pump in the hospital      Thank you for this consult. IDS will continue to follow.      Communicated with primary team, Dr Derick Villasenor.  No need to manage insulin pump.  Ok with IDS signing off.  They will communicate with us if further assistance is needed.      Estrella Liu PA-C  Inpatient Diabetes Service  Pager   [phone]  Available by [employer]  Date of Service: 5/8/2024      To contact Inpatient Diabetes Service:     7 AM - 5 PM: Page the IDS MARIA LUISA following the patient that day (see filed or incomplete progress notes/consult notes under Endocrinology)    OR if uncertain of provider assignment: page job code [phone]    5 PM - 7 AM: First call after hours is to primary service.    For urgent after-hours questions, page job code for on call fellow: [phone]     I spent a total of 80 minutes on the date of the encounter doing prep/post-work, chart review, history and exam, documentation and further activities per the note including lab review, multidisciplinary communication, counseling the patient and/or coordinating care regarding acute hyper/hypoglycemic management, as well as discharge management and planning/communication.  See note for details.

## 2024-05-08 NOTE — PHARMACY-ADMISSION MEDICATION HISTORY
Pharmacist Admission Medication History    Admission medication history is complete. The information provided in this note is only as accurate as the sources available at the time of the update.    Information Source(s): Patient, Patient's pharmacy, Clinic records, and [facility]    Changes made to PTA medication list:  Added: insulin lispro (for insulin pump)   Deleted: famotidine, ondansetron (patient requested removal)   Changed: None    Allergies reviewed with patient and updates made in EHR: no    Medication History Completed By: ADRIAN SIBLEY [facility] 5/7/2024 8:22 PM    Prior to Admission medications    Medication Sig Last Dose Taking? Auth Provider Long Term End Date   insulin lispro (HUMALOG VIAL) 100 UNIT/ML vial Inject Subcutaneous continuous prn for other For pump refills  Yes Unknown, Entered By History     Continuous Blood Gluc Sensor (FREESTYLE HIRAL 3 SENSOR) MISC    Reported, Patient     Insulin Disposable Pump (OMNIPOD DASH PODS, GEN 4,) MISC Insulin lispro 1.5 units/h 00:00-10am, 1.15 units/h 10:00-18:00, 2 units/h 18:00-12am   Reported, Patient

## 2024-05-08 NOTE — CONSULTS
[facility] Orthopedic Consultation    Glenny Campos MRN# [id number]   Age: 58 year old YOB: 1965   Date of Admission:  5/7/2024    Reason for consult: Diabetic foot wound/osteomyelitis   Requesting physician: Souleymane Mosher MD              Impression and Recommendation:   Impression:  Glenny Campos is a 58 year old female with DM2, peripheral neuropathy, cellulitis, history of osteomyelitis of the left third toe, IDDM, history of DKA admitted on 5/7/2024 for evaulation and management of a  left plantar foot ulcer. Patient is currently not septic with 0/4 SIRS.        Recomendations:  Operative Plan: None at this time.   Activity: NWB LLE while wound heals. ROM as tolerated.   Splint: CAM boot applied to extremity and should remain in place at all times, strict. Only remove once daily for wound cares.  Wound Cares/Dressing: Daily dressing changes and wound flushing by nursing and WOC.  DVT PPx: Per primary. Currently on levaquin, vanc, flagyl.  Antibiotic: Recommend broad spectrum IV antibiotic for infected ulcer; once clinical picture improves, transition to oral antibiotics.   Cultures: Would recommend blood cultures x 2 done once today.  Labs: Follow inflammatory labs; repeat WBC, CRP and PCT q 48 hours until trending down. Albumin/pre-albumin today for eval for protein deficiency. Vitamin D deficiency lab today.   Imaging: No further imaging needed at this time.   Vascular workup: ALEX's to be done today. If < 0.8, recommend vascular consultation to eval for potential benefit of lower extremity revascularization. Recommend getting TcO2 of lower extremity to eval perfusion.  PT/OT: Work on strengthening, gait training, mobility, and ADLs  Consults: Consider infectious disease consult to aid in antibiotic selection/duration. Vascular surgery as above.Recommend nutrition consult to eval for nutritional deficiency and consideration of protein/vitamin/mineral supplementation to  improve wound healing potential.   Dispo: Per Primary team. No current plans for OR.   Follow Up: Pending.    Chief Compliant:   Right third toe and Left plantar foot ulcer         History of Present Illness:   This patient is a 58 year old female with DMII, peripheral neuropathy, cellulitis, history of osteomyelitis of the left third toe, IDDM, history of DKA admitted on 2024 for IV antibiotics as a direct admit from Dr. Chavira's podiatry clinic.     Per patient, her right toe ulcer has been there for 1 week.  Her left foot plantar ulcers been there for 2 weeks.  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  She noticed a callus and now an ulcer. No reported trauma.  Denies current fevers, chills, chest pain, shortness of breath, headache.     Glycemic Control:  Most recent Hgb A1C was 7.7 on .        History obtained from patient interview and chart review.        Past Medical History:     Past Medical History:   Diagnosis Date    Diabetic foot ulcer (H)     Osteomyelitis (H)     Type 2 diabetes mellitus with diabetic polyneuropathy (H)              Past Surgical History:     Past Surgical History:   Procedure Laterality Date    Amputation hallux Left     APPENDECTOMY  1992     SECTION      ,              Social History:   Tobacco use: denies  Alcohol use: 1-2 times per year          Family History:   Reviewed and noncontributory.            Allergies:     Allergies   Allergen Reactions    Piperacillin-Tazobactam In Dex Hives    Cephalosporins      Other reaction(s): *Unknown    Statins Muscle Pain (Myalgia)    Cephalexin Rash    Clindamycin Rash     possible rash, was on zosyn at the same time      Penicillins Hives and Rash             Medications:   Medication reviewed with patient and in chart.  Anticoagulation: None  Antibiotics: On levaquin, vanc, flagyl.          Review of Systems:    CONSTITUTIONAL:  negative for  fevers, chills, sweats, fatigue, malaise and weight loss  HEENT:  negative for tinnitus, earaches, nasal congestion, epistaxis, sore throat  RESPIRATORY:  negative for dyspnea, wheezing, chest pain and cough.  CARDIOVASCULAR:  negative for chest pain, palpitations, orthopnea, edema, syncope.  GASTROINTESTINAL:  negative for nausea, vomiting, diarrhea, constipation, abdominal pain.  GENITOURINARY:  negative for dysuria, nocturia, urinary incontinence and hematuria  INTEGUMENT/BREAST:  negative for rash and skin lesions  HEMATOLOGIC/LYMPHATIC:  negative for easy bruising, bleeding, swelling/edema  ALLERGIC/IMMUNOLOGIC:  negative for recurrent infections and drug reactions  ENDOCRINE: negative for diabetic symptoms including polyuria and polydipsia  MUSCULOSKELETAL: negative for myalgias, arthralgias, joint swelling and muscle weakness  NEUROLOGICAL: negative for headaches, dizziness, gait problems, numbness/tingling          Physical Exam:     BP (!) 143/53 (BP Location: Left arm)   Pulse 67   Temp 98.3  F (36.8  C) (Oral)   Resp 18   Wt 141.3 kg (311 lb 9.6 oz)   SpO2 94%   BMI 44.71 kg/m    General: awake, alert, cooperative, no apparent distress, appears stated age  HEENT: normocephalic, atraumatic, PERRL, EOMI, no scleral icterus, MMM  Respiratory: breathing non-labored, no wheezing  Cardiovascular: peripheral pulses 2+ and symmetric, capillary refill < 2sec, skin wwp  Skin: no rashes or lesions  Neurological: A&Ox3, CN II-XII grossly intact  Musculoskeletal:  BLE: No gross deformity.  A wound is noted at left plantar foot measuring 3cm x 5cm x 0.1cm.  A wound is noted at right planar 3rd digit measuring 1cm x 0.7cm x 0.1cm. No purulent drainage from either wound. No significant elba-wound erythema. No fluctuance or expressible purulence/drainage.  Probing wound reveals no significant cavities or tracts. Negative probe-to-bone. Sensation intact to light touch throughout the  "BLE, however does have some decreased sensation on the plantar feet. Fires TA/Gastroc/EHL/FHL with 5/5 strength. Dorsalis pedis and  arteries are palpable.          Imaging:     CT of the left foot and tibia and fibula obtained on 5/7/2024 demonstrate no fluid collection.  There is erosion of the second proximal phalanx head as well as the third middle and distal phalanx.  Diffuse edema.          Laboratory date:   CBC:  Lab Results   Component Value Date    WBC 8.7 05/07/2024    HGB 13.6 05/07/2024     05/07/2024       BMP:  Lab Results   Component Value Date     05/07/2024    POTASSIUM 4.4 05/07/2024    CHLORIDE 109 (H) 05/07/2024    CO2 22 05/07/2024    BUN 8.6 05/07/2024    CR 0.56 05/08/2024    ANIONGAP 10 05/07/2024    JOSE CARLOS 9.0 05/07/2024     (H) 05/08/2024       Inflammatory Markers:  Lab Results   Component Value Date    WBC 8.7 05/07/2024    SED 21 05/07/2024       Cultures:  No results for input(s): \"CULT\" in the last 168 hours.      Jasmin Vital MD  PGY-1  Orthopaedic Surgery  Please page me at [phone] with any questions/concerns. If there is no timely response, if it is a weekend, or if it is during evening hours, please page the orthopaedic surgery resident on call.    Attestation:  This patient was discussed with Dr Lazo who agrees with the above.      "

## 2024-05-08 NOTE — PROGRESS NOTES
VS: BP (!) 156/57   Pulse 77   Temp 98  F (36.7  C)   SpO2 100%     O2: 100 % at room air    Neuro: Alert and Oriented X 4   Bowel/Bladder: Continent of bladder/bowel   LBM: N/A   Diet: Regular   Skin: Edema on lower extremities and reddish rash  on lower extremities    Pain: No pain reported   Activity: Restricted d/t to generalized lower extremities.   Dressings: Dressings on left foot sole/ Mepilex dressings    LDA: No PIV    Equipment: Belongings with pt   Plan: Continue monitoring signs of infection   Additional Info: presents today for further evaluation of left plantar foot ulcer and cellulitis     Patient is transferring for specialty care or specific procedure        D: Patient admitted/transferred from out via EMS     I: Upon arrival to the unit patient was oriented to room, unit, and call light. Patient s height, weight, and vital signs were obtained. Allergies reviewed and allergy band applied. Provider notified of patient s arrival on the unit. Adult AVS completed. Head to toe assessment completed. Education assessment completed. Care plan initiated.    A: Vital signs stable upon admission. Patient rates pain at 3. Two person skin check completed . SP: Continue to monitor patient s  and intervene as needed. Continue with plan of care. Notify provider with any concerns or changes in patient status.

## 2024-05-08 NOTE — PHARMACY-VANCOMYCIN DOSING SERVICE
Pharmacy Vancomycin Initial Note  Date of Service May 7, 2024  Patient's  1965  58 year old, female    Indication: MRSA, Osteomyelitis, and Skin and Soft Tissue Infection    Current estimated CrCl = Estimated Creatinine Clearance: 160.2 mL/min (based on SCr of 0.6 mg/dL).    Creatinine for last 3 days  2024:  3:06 PM Creatinine 0.60 mg/dL    Recent Vancomycin Level(s) for last 3 days  No results found for requested labs within last 3 days.      Vancomycin IV Administrations (past 72 hours)        No vancomycin orders with administrations in past 72 hours.                    Nephrotoxins and other renal medications (From now, onward)      Start     Dose/Rate Route Frequency Ordered Stop    24 1930  vancomycin (VANCOCIN) 2,500 mg in sodium chloride 0.9 % 500 mL intermittent infusion         2,500 mg  over 120 Minutes Intravenous ONCE 24 1900              Contrast Orders - past 72 hours (72h ago, onward)      None            [employer] Prediction of Planned Initial Vancomycin Regimen  Loading dose: 2500 mg at 21:00 2024.  Regimen: 1500 mg IV every 12 hours.  Exposure target: AUC24 (range)400-600 mg/L.hr   AUC24,ss: 538 mg/L.hr  Probability of AUC24 > 400: 71 %  Ctrough,ss: 13.6 mg/L  Probability of Ctrough,ss > 20: 33 %  Probability of nephrotoxicity (Lodise COLTEN ): 9 %       Plan:  Give vancomycin loading dose of 2500 mg IV (roughly 18mg/kg) followed by 1500mg IV every 12 hours.   Vancomycin monitoring method: AUC  Vancomycin therapeutic monitoring goal: 400-600 mg*h/L  Pharmacy will check vancomycin levels as appropriate in 1-3 Days.    Serum creatinine levels will be ordered daily for the first week of therapy and at least twice weekly for subsequent weeks.      ADRIAN SIBLEY, [facility]

## 2024-05-08 NOTE — CONSULTS
[facility] GENERAL INFECTIOUS DISEASES CONSULTATION     Patient:  Glenny Campos   Date of birth 1965, Medical record number [id number]  Date of Visit:  05/08/2024  Date of Admission: 5/7/2024  Consult Requester:Souleymane Mosher MD          Assessment and Recommendations:   ASSESSMENT:  Infected plantar ulcer on left foot  Right 3rd digit ulcer with surrounding cellulitis, subtle erosive changes on XR  Hx amputation of left hallux and partial 2nd digit due to osteomyelitis  IDDM, A1c 7.7%  Antibiotic allergies: Zosyn (hives), cephalosporins, cephalexin (rash), clindamycin (rash - was on Zosyn at same time), penicillins (rash, hives)    DISCUSSION:   Glenny Campos is a 58 year old female with history of IDDM, previous diabetic foot infection with osteomyelitis, s/p amputation of left hallux and partial amputation of left 2nd toe, dental infection (6/2023) who was admitted from podiatry clinic with left foot plantar ulcer with purulent drainage and cellulitis.    Afebrile, VSS, WBC 8.7 with CRP 30.4. Left foot blister unroofed in podiatry clinic with purulent drainage noted and erythema c/w cellulitis. Culture was sent- GPC on gram stain, culture pending. MRSA nares negative. Previous cultures with K.oxytoca, CoNS, MSSA, Pseudomonas, and anaerobes. Patient has several antimicrobial allergies. Recommend continuing levofloxacin and metronidazole- these can be transitioned over to oral as they both have excellent oral bioavailability so no advantage to giving IV as long as tolerating PO. Continue vancomycin IV for now- awaiting cultures to determine if there are any resistant gram positives.       RECOMMENDATION:  Continue levofloxacin- OK transition to 750mg PO daily  Continue metronidazole - OK transition to 500mg PO TID  Continue vancomycin for now while cultures are pending  - Anticipate transition to full PO regimen prior to discharge if possible based on culture data  Appreciate wound cares and  Ortho/Podiatry evaluation  If not improving, may need to pursue additional imaging with MRI    Thank you for this consult. ID will continue to follow.       Andria Diane PA-C  Pronouns: she/her/hers  Infectious Diseases  Contact via [employer] or [employer] Paging/Directory      70 MINUTES SPENT BY ME on the date of service doing chart review, history, exam, documentation & further activities per the note.       ________________________________________________________________    Consult Question: left foot infection.  Admission Diagnosis: Cellulitis  Infected puncture wound of plantar aspect of foot         History of Present Illness:     Glenny Campos is a 58 year old female with history of IDDM, previous diabetic foot infection with osteomyelitis, s/p amputation of left hallux (2020) and partial amputation of left 2nd toe, dental infection (6/2023) who was admitted from podiatry clinic with left foot plantar ulcer with purulent drainage and cellulitis.    Plantar wound started as a smaller blister about a week ago. The area grew and became increasingly swollen with red surrounding skin. Patient reports that she was standing for several hours with bad shoes on. Has a prescription for medical shoe but had not picked it up yet. No wounds, punctures, abrasions that she is aware of. Reports chills. No fevers, rigors, sweats. Had nausea, vomiting and diarrhea after receiving doses of all 3 antibiotics last night. Feeling better this morning- has some mild nausea, no vomiting, abdominal pain or diarrhea today. Denies cough, shortness of breath, joint pain, skin rashes, urinary symptoms.    Does not soak feet. Has not been in pools, hot tubs, or lates for >6 months. No pets at home. Is around horses. Travels for work- last trip was in September. Only international travel was a cruise to [facility] and [facility] in February 2020.     Historical cultures reviewed:  5/2023 left 2nd toe: Klebsiella oxytoca/Raoultella  ornithinolytica, Strep agalactiae (group B strep), MSSA, and H.parainfluenzae   2020 Left great toe: MSSA, CoNS, Finegoldia magna,   10/2020 Left great toe: Pseudomonas aeruginosa (pan-S), Finegoldia magna, CoNS  2020 Left great toe: E.faecalis (amp S), CoNS, and Lelliottia amnigena, GPB resembling Corynebacterium, Pseudomonas aeruginosa  2020 Left great toe: MSSA, GBS, Peptostreptococcus, Porphyromonas species, Prevotella disiens  2017 unknown source: GAS, MSSA, anaerobic GPC  2017 unknown source: MSSA, Haemophilus species not H.influenzae, Strep mitis, mixed lena  2017 unknown source: Strep mitis, Strep anginosus, CoNS  2016 unknown source: mixed anaerobes, CoNS, GPB resembling Corynebacterium.    Antimicrobials  Levofloxacin 5/7 - present  Metronidazole /7 - present  Vancomycin 5/7 - present         Past Medical History:     Past Medical History:   Diagnosis Date    Diabetic foot ulcer (H)     Osteomyelitis (H)     Type 2 diabetes mellitus with diabetic polyneuropathy (H)             Past Surgical History:     Past Surgical History:   Procedure Laterality Date    Amputation hallux Left     APPENDECTOMY  1992     SECTION      ,             Family History:   Reviewed and non-contributory.   Family History   Problem Relation Age of Onset    Colon Cancer Mother     Glaucoma Mother     Diabetes Type 2  Mother     Dementia Father     Heart Failure Maternal Grandmother     Cerebrovascular Disease Maternal Grandfather             Social History:     Social History     Tobacco Use    Smoking status: Never    Smokeless tobacco: Never   Substance Use Topics    Alcohol use: Not on file     History   Sexual Activity    Sexual activity: Not on file            Current Medications:     Current Facility-Administered Medications   Medication Dose Route Frequency Provider Last Rate Last Admin    insulin aspart (NovoLOG/FIASP) 100 UNIT/ML VIAL FOR FILLING PUMP RESERVOIR   Device See Admin  Instructions Wendy Forbes PA-C        insulin bolus from AMBULATORY PUMP   Subcutaneous 4x Daily AC & HS Wendy Forbes PA-C   $Given by Patient/Family at 05/08/24 0840    insulin bolus from AMBULATORY PUMP   Subcutaneous TID AC Wendy Forbes PA-C   $Given by Patient/Family at 05/08/24 0841    levofloxacin (LEVAQUIN) infusion 750 mg  750 mg Intravenous Q24H Wendy Forbes PA-C 100 mL/hr at 05/07/24 2311 750 mg at 05/07/24 2311    metroNIDAZOLE (FLAGYL) infusion 500 mg  500 mg Intravenous Q8H Wendy Forbes PA-C   500 mg at 05/08/24 0837    sodium chloride (PF) 0.9% PF flush 3 mL  3 mL Intracatheter Q8H Wendy Fobres PA-C   3 mL at 05/08/24 0138    vancomycin (VANCOCIN) 1,500 mg in 0.9% NaCl 250 mL intermittent infusion  1,500 mg Intravenous Q12H Souleymane Mosher MD                Allergies:     Allergies   Allergen Reactions    Piperacillin-Tazobactam In Dex Hives    Cephalosporins      Other reaction(s): *Unknown    Statins Muscle Pain (Myalgia)    Cephalexin Rash    Clindamycin Rash     possible rash, was on zosyn at the same time      Penicillins Hives and Rash            Physical Exam:   Vitals were reviewed  Patient Vitals for the past 24 hrs:   BP Temp Temp src Pulse Resp SpO2 Weight   05/08/24 0923 (!) 143/53 98.3  F (36.8  C) Oral 67 18 94 % --   05/07/24 2147 -- -- -- -- -- -- 141.3 kg (311 lb 9.6 oz)   05/07/24 2100 (!) 147/54 98.3  F (36.8  C) Oral 78 16 96 % --   05/07/24 1900 -- -- -- -- -- -- 139.5 kg (307 lb 8.7 oz)   05/07/24 1700 (!) 156/57 98  F (36.7  C) -- 77 -- 100 % --       Physical Examination:  GENERAL:  awake, alert, interactive. Moves to seated on edge of bed with no assistance.  HEENT:  Head is normocephalic, atraumatic   EYES:  Eyes have anicteric sclerae without conjunctival injection or discharge    ENT:  Oropharynx is moist without exudates or ulcers.   LUNGS:  Clear to auscultation bilateral, mildly diminished bases bilaterally. No wheeze, rhonchi, or crackles.    CARDIOVASCULAR:  RRR, +S1/S2, no murmur.  SKIN:  No diaphoresis, jaundice, or rash. PIVin place without any surrounding erythema or exudate. No stigmata of endocarditis.   LLE: erythema over lower 1/2 of shin with area of induration most pronounced on anterior/lateral aspect. Wound care RN just placed dressings on left plantar foot and right 3rd toe- photos reviewed.   NEUROLOGIC:  Grossly nonfocal. Active x4 extremities. Speech clear. No tremor.         Laboratory Data:     Inflammatory Markers    Recent Labs   Lab Test 05/07/24  1506 06/23/23  1422 06/23/23  1032   SED 21 40*  --    CRPI 30.40*  --  316.00*       Hematology Studies    Recent Labs   Lab Test 05/07/24  1506 06/28/23  0815 06/27/23  0943 06/26/23  0704 06/24/23  0601 06/23/23  0622   WBC 8.7 5.2 5.2 5.0 12.2* 15.1*   HGB 13.6 12.5 13.0 12.2 12.8 13.8   MCV 89 92 93 93 93 92    261 294 231 276 251       Metabolic Studies     Recent Labs   Lab Test 05/08/24  0649 05/07/24  1506 07/01/23  0543 06/30/23  0833 06/29/23  0639 06/28/23  0815   NA  --  141 149* 145 141 142   POTASSIUM  --  4.4 3.4 3.6 3.5 3.5   CHLORIDE  --  109* 111* 109* 106 105   CO2  --  22 24 23 25 23   BUN  --  8.6 6.5 5.9* 5.0* 4.7*   CR 0.56 0.60 0.56 0.49* 0.50* 0.51   GFRESTIMATED >90 >90 >90 >90 >90 >90       Hepatic Studies    Recent Labs   Lab Test 05/07/24  1506 06/27/23  0942 06/26/23  0704 06/25/23  0756 06/23/23  0622 06/22/23  1640   BILITOTAL 0.3 0.2 0.2 0.3 0.4 0.5   ALKPHOS 92 92 88 94 107* 102   ALBUMIN 4.1 3.6 3.3* 3.2* 4.0 4.2   AST 22 16 20 30 18 16   ALT 12 12 11 16 12 5       Microbiology:  Culture   Date Value Ref Range Status   06/28/2023 No Growth  Final   06/28/2023 No Growth  Final   06/27/2023 No Growth  Final   06/26/2023 No Growth  Final   06/25/2023 No Growth  Final   06/24/2023 No Growth  Final   06/24/2023 No Growth  Final   06/24/2023 4+ Actinomyces odontolyticus (A)  Final     Comment:     Susceptibilities not routinely done, refer to  antibiogram to view typical susceptibility profiles   06/24/2023 3+ Streptococcus intermedius (A)  Final     Comment:     Not isolated or reported on routine aerobic cultureThis organism is susceptible to ampicillin, penicillin, vancomycin and the cephalosporins. If treatment is required and your patient is allergic to penicillin, contact the microbiology lab within 5   days to request susceptibility testing.   06/24/2023 4+ Streptococcus anginosus (A)  Final     Comment:     This organism is susceptible to ampicillin, penicillin, vancomycin and the cephalosporins. If treatment is required and your patient is allergic to penicillin, contact the microbiology lab within 5 days to request susceptibility testing.Beta hemolyt  ic strain   06/24/2023 4+ Streptococcus mitis group (A)  Final     Comment:     This organism is susceptible to ampicillin, penicillin, vancomycin and the cephalosporins. If treatment is required and your patient is allergic to penicillin, contact the microbiology lab within 5 days to request susceptibility testing.   06/24/2023 4+ Streptococcus anginosus (A)  Final     Comment:     This organism is susceptible to ampicillin, penicillin, vancomycin and the cephalosporins. If treatment is required and your patient is allergic to penicillin, contact the microbiology lab within 5 days to request susceptibility testing.   06/24/2023 4+ Actinomyces odontolyticus (A)  Corrected     Comment:     Susceptibilities not routinely done, refer to antibiogram to view typical susceptibility profiles.  See susceptibility testing on Anaerobic culture.   06/23/2023 Positive on the 1st day of incubation (A)  Final   06/23/2023 Staphylococcus epidermidis (AA)  Final     Comment:     1 of 2 bottles   06/23/2023 Positive on the 1st day of incubation (A)  Final   06/23/2023 Staphylococcus epidermidis (AA)  Final     Comment:     2 of 2 bottlesSusceptibilities done on previous cultures   06/23/2023 No Growth  Final        Urine Studies    Recent Labs   Lab Test 05/07/24  2114 06/23/23  0915   LEUKEST Negative Small*   WBCU 1 24*       Vancomycin Levels    Recent Labs   Lab Test 06/30/23  0833 06/28/23  0815 06/26/23  1042   VANCOMYCIN 11.7 <4.0 11.9             Imaging:     CT Left foot with tibia fibula w/ contrast 5/7/24  IMPRESSION:  1.  Erosive changes of the second proximal phalanx head as well as the third middle and distal phalanx, suspicious for osteomyelitis, although may be chronic. If clinical concern for acute osteomyelitis, MRI may be considered.     2.  Status post amputation of the first digit at the metatarsophalangeal joint, without definite evidence of acute osteomyelitis.     3.  Diffuse subcutaneous edema of the leg, ankle, and foot. No abscess. No soft tissue gas.    CXR 5/7/24  IMPRESSION: No acute airspace disease.     XR toe right 5/7/24  IMPRESSION: Skin defect at the third toe with question of subtle  erosive changes involving the distal tuft of the right third toe.    XR foot left 5/7/24  IMPRESSION: Fairly stable postoperative changes of amputation  involving the distal first metatarsal and great toe as well as partial  amputation of the left second toe.

## 2024-05-08 NOTE — PROGRESS NOTES
[facility]    Medicine Progress Note - Hospitalist Service, GOLD TEAM 22    Date of Admission:  5/7/2024    Assessment & Plan     Glenny Campos is a 58F w/ PMH history of cellulitis, history of osteomyelitis of the left third toe, IDDM, history of DKA admitted from podiatry clinic on 5/7/2024 with an infected left plantar ulcer     Infected diabetic foot ulcer   History of polymicrobial infections and DFI with amputations   Patient presents with L foot plantar ulcer with purulent drainage.She notes wearing ill fitted shoes and prolonged standing at work. No known history of antecedent penetrating trauma. Symptomatic improvement this morning   - ID consulted, discussed plan of care and appreciate recommendations   Switched to PO levaquin, PO metronidazole, continue IV vancomycin   Follow up wound culture - GPC, Staph aureus pending sensitivities   Follow up blood culture - NGTD   MRSA nasal swab - Negative   Podiatry consulted, will follow up on recommendations   WOC consulted, reviewed note 5/8    IDDM type II with history of DKA (A1C 8.5 on 3/28/24)  DM neuropathy  PTA on insulin pump with humalog 1.5 units/h 00:00-10am, 1.15 units/h 10:00-18:00, 2 units/h 18:00-12am. Glucose controlled here and per patient's CGM  -Consulted endocrinology, apprecaite recommendations - patient can manage her own insulin pump   -Check glucose ACHS, hypoglycemia protocol              Diet: Consistent Carbohydrate Diet Moderate Consistent Carb (60 g CHO per Meal) Diet    DVT Prophylaxis: Ambulate every shift  Brenner Catheter: Not present  Lines: None     Cardiac Monitoring: None  Code Status:      Clinically Significant Risk Factors Present on Admission                                  Disposition Plan     Medically Ready for Discharge: Anticipated in 2-4 Days             Yinka Clarke MD  Hospitalist Service, GOLD TEAM 22  [facility]  Securely  message with [employer] (more info)  Text page via [employer] Paging/Directory   See signed in provider for up to date coverage information  ______________________________________________________________________    Interval History   Glenny notes feeling better this morning. She had chills and malaise when she received IV antibiotics last evening. No chills or rigors since. No new areas of blisters or rash      Physical Exam   Vital Signs: Temp: 98.3  F (36.8  C) Temp src: Oral BP: (!) 143/53 Pulse: 67   Resp: 18 SpO2: 94 % O2 Device: None (Room air)    Weight: 311 lbs 9.6 oz    Gen: Awake and alert, sitting at edge of bed   Resp: B/L clear   CVS: normal s1 s2  Abd: Non tender   Ext: L foot with dressing c/d/I . No ascending erythema, no pain with ankle movement         Medical Decision Making             Data     I have personally reviewed the following data over the past 24 hrs:    N/A  \   N/A   / N/A     N/A N/A N/A /  111 (H)   N/A N/A 0.56 \     TSH: N/A T4: N/A A1C: 7.7 (H)       Imaging results reviewed over the past 24 hrs:   Recent Results (from the past 24 hour(s))   XR Chest 2 Views    Narrative    EXAM: XR CHEST 2 VIEWS  5/7/2024 10:49 PM     HISTORY:  Cough and SIRS assess for consolidation       COMPARISON:  None available    FINDINGS: Two views of the chest. The cardiac silhouette is enlarged.  No significant pleural effusion or pneumothorax. No focal airspace  opacity. The visualized upper abdomen and bones are unremarkable.      Impression    IMPRESSION: No acute airspace disease.    I have personally reviewed the examination and initial interpretation  and I agree with the findings.    EZRA MEADE MD         SYSTEM ID:  [id number]   CT Tibia Fibula Lower Leg Left w Contrast    Narrative    EXAM: CT TIBIA FIBULA LOWER LEG LEFT W CONTRAST, CT FOOT LEFT W CONTRAST  LOCATION: [facility]  DATE: 5/7/2024    INDICATION: Left lower extremity infection.  COMPARISON:  Radiographs 5/7/2024 and 7/18/2023.  TECHNIQUE: IV contrast. Axial, sagittal and coronal thin-section reconstruction. Dose reduction techniques were used.   CONTRAST: 90mL Isovue 370    FINDINGS:     BONES:  -Status post first digit amputation at the level of the metatarsophalangeal joint. Cortical irregularity of the first metatarsal head, likely unchanged since radiographs dated 7/18/2023. No definite lytic lucency.  -Absence of the second middle and distal phalanx. There are erosive changes involving the second proximal phalanx head, with cortication of the margins. No definite lytic lucency.  -There are erosive changes involving the third middle and distal phalanx, with cortication of the margins. There is mild sclerosis of the third middle and distal phalanx. No definite lytic lucency.  -No acute fracture or dislocation.  -Mild tricompartmental degenerative changes of the knee.  -Moderate degenerative changes of the midfoot.  -Small posterior calcaneal enthesophyte. Prominent plantar calcaneal enthesophyte.    SOFT TISSUES:  -Circumferential subcutaneous edema of the leg, severe in the lower leg and ankle, most pronounced laterally.  -Moderate subcutaneous edema of the plantar foot. Mild subcutaneous edema of the dorsal foot.  -No rim-enhancing fluid collection.  -Diffuse fatty infiltration of the intrinsic foot musculature.  -Leg musculature is grossly unremarkable.  -Scattered vascular calcifications.      Impression    IMPRESSION:  1.  Erosive changes of the second proximal phalanx head as well as the third middle and distal phalanx, suspicious for osteomyelitis, although may be chronic. If clinical concern for acute osteomyelitis, MRI may be considered.    2.  Status post amputation of the first digit at the metatarsophalangeal joint, without definite evidence of acute osteomyelitis.    3.  Diffuse subcutaneous edema of the leg, ankle, and foot. No abscess. No soft tissue gas.     CT Foot Left w Contrast     Narrative    EXAM: CT TIBIA FIBULA LOWER LEG LEFT W CONTRAST, CT FOOT LEFT W CONTRAST  LOCATION: [facility]  DATE: 5/7/2024    INDICATION: Left lower extremity infection.  COMPARISON: Radiographs 5/7/2024 and 7/18/2023.  TECHNIQUE: IV contrast. Axial, sagittal and coronal thin-section reconstruction. Dose reduction techniques were used.   CONTRAST: 90mL Isovue 370    FINDINGS:     BONES:  -Status post first digit amputation at the level of the metatarsophalangeal joint. Cortical irregularity of the first metatarsal head, likely unchanged since radiographs dated 7/18/2023. No definite lytic lucency.  -Absence of the second middle and distal phalanx. There are erosive changes involving the second proximal phalanx head, with cortication of the margins. No definite lytic lucency.  -There are erosive changes involving the third middle and distal phalanx, with cortication of the margins. There is mild sclerosis of the third middle and distal phalanx. No definite lytic lucency.  -No acute fracture or dislocation.  -Mild tricompartmental degenerative changes of the knee.  -Moderate degenerative changes of the midfoot.  -Small posterior calcaneal enthesophyte. Prominent plantar calcaneal enthesophyte.    SOFT TISSUES:  -Circumferential subcutaneous edema of the leg, severe in the lower leg and ankle, most pronounced laterally.  -Moderate subcutaneous edema of the plantar foot. Mild subcutaneous edema of the dorsal foot.  -No rim-enhancing fluid collection.  -Diffuse fatty infiltration of the intrinsic foot musculature.  -Leg musculature is grossly unremarkable.  -Scattered vascular calcifications.      Impression    IMPRESSION:  1.  Erosive changes of the second proximal phalanx head as well as the third middle and distal phalanx, suspicious for osteomyelitis, although may be chronic. If clinical concern for acute osteomyelitis, MRI may be considered.    2.  Status post  amputation of the first digit at the metatarsophalangeal joint, without definite evidence of acute osteomyelitis.    3.  Diffuse subcutaneous edema of the leg, ankle, and foot. No abscess. No soft tissue gas.

## 2024-05-08 NOTE — CONSULTS
[facility]  WO Nurse Inpatient Assessment     Consulted for: Bilateral foot wounds    Summary: Follows with outpatient Podiatry. Podiatry also consulted for inpatient assessment.     Patient History (according to provider note(s):      Per Wendy Forbes PA-C on 5/7/2024: Glenny Campos is a 58F w/ PMH history of cellulitis, history of osteomyelitis of the left third toe, IDDM, history of DKA admitted on 5/7/2024 with an infected left plantar 3rd digit ulcer.     Assessment:      Areas visualized during today's visit: Bilateral feet    Wound location: Left plantar foot, right plantar 3rd toe     5/8 Left plantar foot                                       5/8 Right plantar 3rd toe    Last photo: 5/8/2024  Wound due to: Diabetic Ulcer  Wound history/plan of care: She also has an ulcer on the left plantar 3rd digit. She notes that these started about a week ago. She was standing for may hours at a horse show.   Wound base: Right 3rd toe: 100 % dried drainage; Left plantar: 100% dry, non-granular tissue     Palpation of the wound bed: boggy under left plantar foot wound     Drainage: none     Description of drainage: none     Measurements (length x width x depth, in cm): Left 8  x 3.5  x  0.1 cm; Right 2 x 3 x 0.1     Tunneling: N/A     Undermining: N/A  Periwound skin: Intact      Color: pale      Temperature: normal   Odor: none  Pain: denies   Pain interventions prior to dressing change: no significant pain present   Treatment goal: Infection control/prevention and Protection  STATUS: initial assessment  Supplies ordered: gathered       Treatment Plan:     Left plantar foot and left plantar 3rd toe wound(s): Every other day: Wash wound with Vaseh and gauze. Do not rinse. Cover open areas with a piece of Aquacel Ag (order #[id number]). Secure toe dressing with BandAid, secure plantar foot dressing with gauze and Kerlix.     Orders: Written    RECOMMEND PRIMARY TEAM ORDER:  None, at this time  Education provided: plan of care  Discussed plan of care with: Patient and Nurse  WO nurse follow-up plan: weekly  Notify WOC if wound(s) deteriorate.  Nursing to notify the Provider(s) and re-consult the WO Nurse if new skin concern.    DATA:     Current support surface: Standard  Standard Isoflex gel  Containment of urine/stool: Continent of bladder and Continent of bowel  BMI: Body mass index is 44.71 kg/m .   Active diet order: Orders Placed This Encounter      NPO for Medical/Clinical Reasons Except for: Meds     Output: I/O last 3 completed shifts:  In: -   Out: 300 [Emesis/NG output:300]     Labs:   Recent Labs   Lab 05/07/24  1914 05/07/24  1506   ALBUMIN  --  4.1   HGB  --  13.6   WBC  --  8.7   A1C 7.7*  --      Pressure injury risk assessment:   Sensory Perception: 4-->no impairment  Moisture: 4-->rarely moist  Activity: 4-->walks frequently  Mobility: 3-->slightly limited  Nutrition: 3-->adequate  Friction and Shear: 1-->problem  Slaon Score: 19    Francesca Mendoza RN CWOCN  Pager no longer is use, please contact through [employer] group: [facility]  Dept. Office Number: [phone]

## 2024-05-09 VITALS
SYSTOLIC BLOOD PRESSURE: 127 MMHG | RESPIRATION RATE: 18 BRPM | TEMPERATURE: 98.1 F | DIASTOLIC BLOOD PRESSURE: 55 MMHG | HEART RATE: 71 BPM | BODY MASS INDEX: 44.71 KG/M2 | OXYGEN SATURATION: 99 % | WEIGHT: 293 LBS

## 2024-05-09 LAB
BACTERIA WND CULT: ABNORMAL
CREAT SERPL-MCNC: 0.59 MG/DL (ref 0.51–0.95)
CRP SERPL-MCNC: 37.09 MG/L
EGFRCR SERPLBLD CKD-EPI 2021: >90 ML/MIN/1.73M2
ERYTHROCYTE [DISTWIDTH] IN BLOOD BY AUTOMATED COUNT: 12 % (ref 10–15)
GLUCOSE BLDC GLUCOMTR-MCNC: 103 MG/DL (ref 70–99)
GLUCOSE BLDC GLUCOMTR-MCNC: 144 MG/DL (ref 70–99)
GLUCOSE BLDC GLUCOMTR-MCNC: 93 MG/DL (ref 70–99)
GRAM STAIN RESULT: ABNORMAL
GRAM STAIN RESULT: ABNORMAL
HCT VFR BLD AUTO: 35.7 % (ref 35–47)
HGB BLD-MCNC: 12.1 G/DL (ref 11.7–15.7)
MCH RBC QN AUTO: 30.9 PG (ref 26.5–33)
MCHC RBC AUTO-ENTMCNC: 33.9 G/DL (ref 31.5–36.5)
MCV RBC AUTO: 91 FL (ref 78–100)
PLATELET # BLD AUTO: 214 10E3/UL (ref 150–450)
RBC # BLD AUTO: 3.91 10E6/UL (ref 3.8–5.2)
VANCOMYCIN SERPL-MCNC: 6.6 UG/ML
WBC # BLD AUTO: 5.1 10E3/UL (ref 4–11)

## 2024-05-09 PROCEDURE — 36415 COLL VENOUS BLD VENIPUNCTURE: CPT | Performed by: PHYSICIAN ASSISTANT

## 2024-05-09 PROCEDURE — 80202 ASSAY OF VANCOMYCIN: CPT | Performed by: STUDENT IN AN ORGANIZED HEALTH CARE EDUCATION/TRAINING PROGRAM

## 2024-05-09 PROCEDURE — 86140 C-REACTIVE PROTEIN: CPT | Performed by: PHYSICIAN ASSISTANT

## 2024-05-09 PROCEDURE — 99239 HOSP IP/OBS DSCHRG MGMT >30: CPT | Performed by: STUDENT IN AN ORGANIZED HEALTH CARE EDUCATION/TRAINING PROGRAM

## 2024-05-09 PROCEDURE — L3260 AMBULATORY SURGICAL BOOT EAC: HCPCS

## 2024-05-09 PROCEDURE — 250N000013 HC RX MED GY IP 250 OP 250 PS 637: Performed by: STUDENT IN AN ORGANIZED HEALTH CARE EDUCATION/TRAINING PROGRAM

## 2024-05-09 PROCEDURE — 85014 HEMATOCRIT: CPT | Performed by: PHYSICIAN ASSISTANT

## 2024-05-09 PROCEDURE — 82565 ASSAY OF CREATININE: CPT | Performed by: STUDENT IN AN ORGANIZED HEALTH CARE EDUCATION/TRAINING PROGRAM

## 2024-05-09 PROCEDURE — 99233 SBSQ HOSP IP/OBS HIGH 50: CPT | Performed by: PHYSICIAN ASSISTANT

## 2024-05-09 PROCEDURE — 87040 BLOOD CULTURE FOR BACTERIA: CPT | Performed by: PHYSICIAN ASSISTANT

## 2024-05-09 RX ORDER — DOXYCYCLINE 100 MG/1
100 CAPSULE ORAL EVERY 12 HOURS
Qty: 24 CAPSULE | Refills: 0 | Status: SHIPPED | OUTPATIENT
Start: 2024-05-09 | End: 2024-05-21

## 2024-05-09 RX ORDER — METRONIDAZOLE 500 MG/1
500 TABLET ORAL 3 TIMES DAILY
Qty: 36 TABLET | Refills: 0 | Status: SHIPPED | OUTPATIENT
Start: 2024-05-09 | End: 2024-05-21

## 2024-05-09 RX ORDER — METRONIDAZOLE 500 MG/1
500 TABLET ORAL 3 TIMES DAILY
Status: DISCONTINUED | OUTPATIENT
Start: 2024-05-09 | End: 2024-05-09 | Stop reason: HOSPADM

## 2024-05-09 RX ORDER — DOXYCYCLINE HYCLATE 20 MG
20 TABLET ORAL EVERY 12 HOURS SCHEDULED
Status: DISCONTINUED | OUTPATIENT
Start: 2024-05-09 | End: 2024-05-09

## 2024-05-09 RX ORDER — LEVOFLOXACIN 750 MG/1
750 TABLET, FILM COATED ORAL EVERY 24 HOURS
Qty: 12 TABLET | Refills: 0 | Status: SHIPPED | OUTPATIENT
Start: 2024-05-09 | End: 2024-05-21

## 2024-05-09 RX ORDER — DOXYCYCLINE 100 MG/1
100 CAPSULE ORAL EVERY 12 HOURS SCHEDULED
Status: DISCONTINUED | OUTPATIENT
Start: 2024-05-09 | End: 2024-05-09 | Stop reason: HOSPADM

## 2024-05-09 RX ADMIN — METRONIDAZOLE 500 MG: 500 TABLET ORAL at 07:54

## 2024-05-09 RX ADMIN — METRONIDAZOLE 500 MG: 500 TABLET ORAL at 13:44

## 2024-05-09 ASSESSMENT — ACTIVITIES OF DAILY LIVING (ADL)
ADLS_ACUITY_SCORE: 38
ADLS_ACUITY_SCORE: 40
ADLS_ACUITY_SCORE: 40
ADLS_ACUITY_SCORE: 38
ADLS_ACUITY_SCORE: 40
ADLS_ACUITY_SCORE: 38
ADLS_ACUITY_SCORE: 38
ADLS_ACUITY_SCORE: 40
ADLS_ACUITY_SCORE: 38

## 2024-05-09 NOTE — PLAN OF CARE
Goal Outcome Evaluation:      Overall Patient Progress: no change    Pt A & O X 4. Up independently in room to the BR. No c/o pain or discomfort. Managing her own insulin pump but nursing is doing blood glucose checks. Pt refused Vancomycin IV at 2300 and stated that she already talked to one of the providers from the day team that she no longer wanted it. This information was relayed via text message to both MD (Starr Sunshine and Flores Rose) via text messages. Nursing is monitoring and assisting as needed      Karolina Ramirez RN

## 2024-05-09 NOTE — PROGRESS NOTES
VS: /57 (BP Location: Left arm)   Pulse 67   Temp 98.4  F (36.9  C) (Oral)   Resp 18   Wt 141.3 kg (311 lb 9.6 oz)   SpO2 96%   BMI 44.71 kg/m      O2: 96 % at room air    Neuro: Alert and Oriented X 4   Bowel/Bladder: Continent of bladder/bowel   LBM: N/A   Diet: Regular   Skin: Edema on lower extremities and reddish rash  on lower extremities    Pain: No pain reported   Activity: Restricted d/t to generalized lower extremities.   Dressings: Dressings on left foot sole/ Mepilex dressings    LDA: No PIV    Equipment: Belongings with pt   Plan: Continue monitoring infection and pain level   Additional Info: presents today for further evaluation of left plantar foot ulcer and cellulitis     Patient is transferring for specialty care or specific procedure

## 2024-05-09 NOTE — DISCHARGE SUMMARY
[facility]  Hospitalist Discharge Summary      Date of Admission:  5/7/2024  Date of Discharge:  5/9/2024  Discharging Provider: Yinka Clarke MD  Discharge Service: Hospitalist Service, GOLD TEAM 22    Discharge Diagnoses   Diabetic foot ulcer with  wound infection and cellulitis     Clinically Significant Risk Factors          Follow-ups Needed After Discharge   Follow-up Appointments     Adult [facility] Follow-up and recommended labs and tests      Follow up with primary care provider, Physician No Ref-Primary, within 7   days for hospital follow- up and to follow up on results.  The following   labs/tests are recommended: CBC w Diff, CMP weekly while on antibiotic   therapy .    Follow up with Dr. Chavira, referral made.     Appointments on [place] and/or [facility] (with [facility] or [facility]   provider or service). Call [phone] if you haven't heard regarding   these appointments within 7 days of discharge.        {Additional follow-up instructions/to-do's for PCP    : Follow up on pending blood cultures,Follow up on weekly CBC x Diff, CMP, and EKG for Qtc while on antibiotic therapy    Unresulted Labs Ordered in the Past 30 Days of this Admission       Date and Time Order Name Status Description    5/9/2024 12:01 AM Blood Culture Hand, Right In process     5/9/2024 12:01 AM Blood Culture Hand, Left In process     5/7/2024  5:38 PM Blood Culture Hand, Left Preliminary     5/7/2024  5:38 PM Blood Culture Hand, Right Preliminary     5/7/2024  2:44 PM ANAEROBIC BACTERIAL CULTURE ROUTINE Preliminary         These results will be followed up by PCP     Discharge Disposition   Discharged to home  Condition at discharge: Stable    Hospital Course      Glenny Campos is a 58F w/ PMH history of cellulitis, history of osteomyelitis of the left third toe, IDDM, history of DKA admitted from podiatry clinic on 5/7/2024 with an infected left plantar ulcer      Infected  diabetic foot ulcer   History of polymicrobial infections and DFI with amputations   Glenny presented with L foot plantar ulcer with purulent drainage.She also has R toe ulcer. She notes wearing ill fitted shoes and prolonged standing at work prior to onset of ulcer. She does not recall history of antecedent penetrating trauma. She was initially evaluated at her outpatient Podiatry clinic appointment and was sent to hospital for further evaluation and management. On admission, she was started on parenteral antibiotic therapy with Vancomycin, Levofloxacin and Metronidazole due to concern for and history of polymicrobial infections. With clinical improvement, she was switched to PO antibiotics - Doxycycline, Levofloxaxine and Metronidazole with plan for 10-14 days of therapy. Infectious disease team were consulted and followed patient during hospital stay to guide antibiotic therapy. Podiatry / orthopedic surgery and wound care teams were also consulted and followed patient during hospital stay with plan to follow up with podiatry outpatient. Offloading shoe was requested and fitted prior to discharge.     wound culture - MSSA  blood culture - No growth   MRSA nasal swab - Negative      IDDM type II with history of DKA (A1C 8.5 on 3/28/24)  DM neuropathy  Glenny has a history of insulin dependent diabetes mellitus, managed with insulin pump and CGM as outpatient. Hb A1c was checked - 7.7, indicating good glycemic control at baseline. Upon admission to hospital, Endocrinology was consulted and recommended continued insulin pump and she is able to manage her own insulin pump. She was monitored with CBG checks with acceptable glycemic control while inpatient.     Consultations This Hospital Stay   INFECTIOUS DISEASE [place] [facility] ADULT IP CONSULT  PHARMACY TO DOSE VANCO  ENDOCRINE DIABETES ADULT IP CONSULT  WOUND OSTOMY CONTINENCE NURSE  IP CONSULT  PODIATRY IP CONSULT  ORTHOSIS EXTREMITY LOWER REFERRAL IP  CONSULT  DIABETES EDUCATION IP CONSULT  ENDOCRINE DIABETES ADULT IP CONSULT    Code Status   Prior    Time Spent on this Encounter   IYinka MD, personally saw the patient today and spent greater than 30 minutes discharging this patient.       Yinka Clarke MD  [facility] [address]  [address] [address]  Phone: [phone]  Fax: [phone]  ______________________________________________________________________    Physical Exam   Vital Signs: Temp: 98.1  F (36.7  C) Temp src: Oral BP: 127/55 Pulse: 71   Resp: 18 SpO2: 99 % O2 Device: None (Room air)    Weight: 311 lbs 9.6 oz    Gen: Awake and alert   Resp: bilateral clear lung fields   CVS: Normal S1 S2  Ext : L foot with dressing c/d/I, no spreading erythema or soaking through bleed or discharge        Primary Care Physician   Physician No Ref-Primary    Discharge Orders      Comprehensive metabolic panel (BMP + Alb, Alk Phos, ALT, AST, Total. Bili, TP)     Orthopedic  Referral      Reason for your hospital stay    You were admitted to hospital with an infection and ulcer in Left Foot. While in hospital, you received IV antibiotics which have been switched to oral antibiotics upon discharge. We recommend you continue to take the antibiotics as prescribed. While on the antibiotics, you should avoid sun exposure or use appropriate covering/sunscreen.   We recommend you follow up with your PCP and podiatrist after discharge.     Activity    Your activity upon discharge: activity as tolerated, no weight bearing / offload left foot until ulcer healed     Adult [facility] Follow-up and recommended labs and tests    Follow up with primary care provider, Physician No Ref-Primary, within 7 days for hospital follow- up and to follow up on results.  The following labs/tests are recommended: CBC w Diff, CMP weekly while on antibiotic therapy .    Follow up with Dr. Chavira, referral made.     Appointments on [place] and/or [facility]  (with [facility] or [facility] provider or service). Call [phone] if you haven't heard regarding these appointments within 7 days of discharge.     Diet    Follow this diet upon discharge: Orders Placed This Encounter      Consistent Carbohydrate Diet Moderate Consistent Carb (60 g CHO per Meal) Diet     CBC with platelets and differential       Significant Results and Procedures   Most Recent 3 CBC's:  Recent Labs   Lab Test 05/09/24  0541 05/07/24  1506 06/28/23  0815   WBC 5.1 8.7 5.2   HGB 12.1 13.6 12.5   MCV 91 89 92    232 261     Most Recent 3 BMP's:  Recent Labs   Lab Test 05/09/24  1119 05/09/24  0734 05/09/24  0541 05/09/24  0205 05/08/24  0808 05/08/24  0649 05/07/24  2331 05/07/24  1506 07/01/23  0543 06/30/23  0850 06/30/23  0833   NA  --   --   --   --   --   --   --  141 149*  --  145   POTASSIUM  --   --   --   --   --   --   --  4.4 3.4  --  3.6   CHLORIDE  --   --   --   --   --   --   --  109* 111*  --  109*   CO2  --   --   --   --   --   --   --  22 24  --  23   BUN  --   --   --   --   --   --   --  8.6 6.5  --  5.9*   CR  --   --  0.59  --   --  0.56  --  0.60 0.56  --  0.49*   ANIONGAP  --   --   --   --   --   --   --  10 14  --  13   JOSE CARLOS  --   --   --   --   --   --   --  9.0 9.0  --  8.7   * 93  --  103*   < >  --    < > 142* 83   < > 93    < > = values in this interval not displayed.     7-Day Micro Results       Collected Updated Procedure Result Status      05/09/2024 0546 05/14/2024 0646 Blood Culture Hand, Right [[id number]]   Blood from Hand, Right    Final result Component Value   Culture No Growth               05/09/2024 0541 05/14/2024 0646 Blood Culture Hand, Left [[id number]]   Blood from Hand, Left    Final result Component Value   Culture No Growth               05/07/2024 1930 05/07/2024 2235 MRSA MSSA PCR, Nasal Swab [[id number]]    Swab from Nose    Final result Component Value   MRSA Target DNA Negative   SA Target DNA Positive            05/07/2024 9749  05/12/2024 2331 Blood Culture Hand, Right [[id number]]   Blood from Hand, Right    Final result Component Value   Culture No Growth               05/07/2024 1818 05/12/2024 2331 Blood Culture Hand, Left [[id number]]   Blood from Hand, Left    Final result Component Value   Culture No Growth                     Most Recent Hemoglobin A1c:  Recent Labs   Lab Test 05/07/24  1914   A1C 7.7*     Most Recent 6 glucoses:  Recent Labs   Lab Test 05/09/24  1119 05/09/24  0734 05/09/24  0205 05/08/24  2233 05/08/24  1718 05/08/24  1139   * 93 103* 105* 148* 111*   ,   Results for orders placed or performed during the hospital encounter of 05/07/24   XR Chest 2 Views    Narrative    EXAM: XR CHEST 2 VIEWS  5/7/2024 10:49 PM     HISTORY:  Cough and SIRS assess for consolidation       COMPARISON:  None available    FINDINGS: Two views of the chest. The cardiac silhouette is enlarged.  No significant pleural effusion or pneumothorax. No focal airspace  opacity. The visualized upper abdomen and bones are unremarkable.      Impression    IMPRESSION: No acute airspace disease.    I have personally reviewed the examination and initial interpretation  and I agree with the findings.    EZRA MEADE MD         SYSTEM ID:  [id number]   CT Tibia Fibula Lower Leg Left w Contrast    Narrative    EXAM: CT TIBIA FIBULA LOWER LEG LEFT W CONTRAST, CT FOOT LEFT W CONTRAST  LOCATION: [facility]  DATE: 5/7/2024    INDICATION: Left lower extremity infection.  COMPARISON: Radiographs 5/7/2024 and 7/18/2023.  TECHNIQUE: IV contrast. Axial, sagittal and coronal thin-section reconstruction. Dose reduction techniques were used.   CONTRAST: 90mL Isovue 370    FINDINGS:     BONES:  -Status post first digit amputation at the level of the metatarsophalangeal joint. Cortical irregularity of the first metatarsal head, likely unchanged since radiographs dated 7/18/2023. No definite lytic  lucency.  -Absence of the second middle and distal phalanx. There are erosive changes involving the second proximal phalanx head, with cortication of the margins. No definite lytic lucency.  -There are erosive changes involving the third middle and distal phalanx, with cortication of the margins. There is mild sclerosis of the third middle and distal phalanx. No definite lytic lucency.  -No acute fracture or dislocation.  -Mild tricompartmental degenerative changes of the knee.  -Moderate degenerative changes of the midfoot.  -Small posterior calcaneal enthesophyte. Prominent plantar calcaneal enthesophyte.    SOFT TISSUES:  -Circumferential subcutaneous edema of the leg, severe in the lower leg and ankle, most pronounced laterally.  -Moderate subcutaneous edema of the plantar foot. Mild subcutaneous edema of the dorsal foot.  -No rim-enhancing fluid collection.  -Diffuse fatty infiltration of the intrinsic foot musculature.  -Leg musculature is grossly unremarkable.  -Scattered vascular calcifications.      Impression    IMPRESSION:  1.  Erosive changes of the second proximal phalanx head as well as the third middle and distal phalanx, suspicious for osteomyelitis, although may be chronic. If clinical concern for acute osteomyelitis, MRI may be considered.    2.  Status post amputation of the first digit at the metatarsophalangeal joint, without definite evidence of acute osteomyelitis.    3.  Diffuse subcutaneous edema of the leg, ankle, and foot. No abscess. No soft tissue gas.     CT Foot Left w Contrast    Narrative    EXAM: CT TIBIA FIBULA LOWER LEG LEFT W CONTRAST, CT FOOT LEFT W CONTRAST  LOCATION: [facility]  DATE: 5/7/2024    INDICATION: Left lower extremity infection.  COMPARISON: Radiographs 5/7/2024 and 7/18/2023.  TECHNIQUE: IV contrast. Axial, sagittal and coronal thin-section reconstruction. Dose reduction techniques were used.   CONTRAST: 90mL Isovue  370    FINDINGS:     BONES:  -Status post first digit amputation at the level of the metatarsophalangeal joint. Cortical irregularity of the first metatarsal head, likely unchanged since radiographs dated 7/18/2023. No definite lytic lucency.  -Absence of the second middle and distal phalanx. There are erosive changes involving the second proximal phalanx head, with cortication of the margins. No definite lytic lucency.  -There are erosive changes involving the third middle and distal phalanx, with cortication of the margins. There is mild sclerosis of the third middle and distal phalanx. No definite lytic lucency.  -No acute fracture or dislocation.  -Mild tricompartmental degenerative changes of the knee.  -Moderate degenerative changes of the midfoot.  -Small posterior calcaneal enthesophyte. Prominent plantar calcaneal enthesophyte.    SOFT TISSUES:  -Circumferential subcutaneous edema of the leg, severe in the lower leg and ankle, most pronounced laterally.  -Moderate subcutaneous edema of the plantar foot. Mild subcutaneous edema of the dorsal foot.  -No rim-enhancing fluid collection.  -Diffuse fatty infiltration of the intrinsic foot musculature.  -Leg musculature is grossly unremarkable.  -Scattered vascular calcifications.      Impression    IMPRESSION:  1.  Erosive changes of the second proximal phalanx head as well as the third middle and distal phalanx, suspicious for osteomyelitis, although may be chronic. If clinical concern for acute osteomyelitis, MRI may be considered.    2.  Status post amputation of the first digit at the metatarsophalangeal joint, without definite evidence of acute osteomyelitis.    3.  Diffuse subcutaneous edema of the leg, ankle, and foot. No abscess. No soft tissue gas.         Discharge Medications   Current Discharge Medication List        START taking these medications    Details   doxycycline hyclate (VIBRAMYCIN) 100 MG capsule Take 1 capsule (100 mg) by mouth every 12  hours for 12 days  Qty: 24 capsule, Refills: 0    Associated Diagnoses: Puncture wound of plantar aspect of left foot with infection, initial encounter      levofloxacin (LEVAQUIN) 750 MG tablet Take 1 tablet (750 mg) by mouth every 24 hours for 12 days  Qty: 12 tablet, Refills: 0    Associated Diagnoses: Puncture wound of plantar aspect of left foot with infection, initial encounter      metroNIDAZOLE (FLAGYL) 500 MG tablet Take 1 tablet (500 mg) by mouth 3 times daily for 12 days  Qty: 36 tablet, Refills: 0    Associated Diagnoses: Puncture wound of plantar aspect of left foot with infection, initial encounter           CONTINUE these medications which have NOT CHANGED    Details   insulin lispro (HUMALOG VIAL) 100 UNIT/ML vial Inject Subcutaneous continuous prn for other For pump refills      Continuous Blood Gluc Sensor (FREESTYLE HIRAL 3 SENSOR) MISC       Insulin Disposable Pump (OMNIPOD DASH PODS, GEN 4,) MISC Insulin lispro 1.5 units/h 00:00-10am, 1.15 units/h 10:00-18:00, 2 units/h 18:00-12am           STOP taking these medications       ondansetron (ZOFRAN ODT) 4 MG ODT tab Comments:   Reason for Stopping:             Allergies   Allergies   Allergen Reactions    Piperacillin-Tazobactam In Dex Hives    Cephalosporins      Other reaction(s): *Unknown    Statins Muscle Pain (Myalgia)    Cephalexin Rash    Clindamycin Rash     possible rash, was on zosyn at the same time      Penicillins Hives and Rash

## 2024-05-09 NOTE — PROGRESS NOTES
S: order received to see patient at [facility] for an eval for an offloading shoe as ordered by Nomi  O/G: offload ulcerated area on patient's left foot  A: patient seen for an evaluation.  Patient has ulceration(s) on their Left foot.  Pegs were removed under ulcerated area.  Patient was fit with a size medium DH2 offloading shoe.  P: patient to contact us if any issues arise.  Torin HAJI,LO   Statement Selected

## 2024-05-09 NOTE — PLAN OF CARE
Goal Outcome Evaluation:      Plan of Care Reviewed With: patient    Overall Patient Progress: improvingOverall Patient Progress: improving     Admitted 5/7/24 for infected L plantar ulcer and wound to R toe.      VS: /55 (BP Location: Left arm)   Pulse 71   Temp 98.1  F (36.7  C) (Oral)   Resp 18   Wt 141.3 kg (311 lb 9.6 oz)   SpO2 99%   BMI 44.71 kg/m       O2: >90% on RA no complaints of SOB or chest pain.   Output: Voiding adequate amounts w/o pain or difficulty to bathroom.   Last BM:    Activity: Non weight bearing to L foot.    Skin: Visible skin intact except wound to L foot and R toe. Redness and edema present to BLE   Pain: 0/10   CMS: AOX4   Dressing: Wound dressing to L Foot   Diet: Regular, tolerating well. No complaints of nausea or vomiting. Ambulatory insulin pump patient controlled.   LDA: Ambulatory pump in place   Equipment: Personal belongings.   Plan: Discharged home this shift.   Additional Info:   Refused vancomycin this shift due to pt stating she is allergic, MD aware.     DISCHARGE SUMMARY    Pt discharging to: Home  Transportation: Family  AVS given and discussed: Yes  Stoplight Tool given and discussed: NA  Medications given: NA  Belongings returned: NA  Comments:

## 2024-05-09 NOTE — PHARMACY-VANCOMYCIN DOSING SERVICE
Pharmacy Vancomycin Note  Date of Service May 9, 2024  Patient's  1965   58 year old, female    Indication: Osteomyelitis and Skin and Soft Tissue Infection  Day of Therapy: since 24  Current vancomycin regimen:  1500 mg IV q12h  Current vancomycin monitoring method: AUC  Current vancomycin therapeutic monitoring goal: 400-600 mg*h/L    InsightRX Prediction of Current Vancomycin Regimen  Loading dose: N/A  Regimen: 1500 mg IV every 12 hours.  Exposure target: AUC24 (range)400-600 mg/L.hr   AUC24,ss: 504 mg/L.hr  Probability of AUC24 > 400: 80 %  Ctrough,ss: 11.5 mg/L  Probability of Ctrough,ss > 20: 0 %  Probability of nephrotoxicity (Lodise COLTEN ): 7 %    Current estimated CrCl = Estimated Creatinine Clearance: 164 mL/min (based on SCr of 0.59 mg/dL).    Creatinine for last 3 days  2024:  3:06 PM Creatinine 0.60 mg/dL  2024:  6:49 AM Creatinine 0.56 mg/dL  2024:  5:41 AM Creatinine 0.59 mg/dL    Recent Vancomycin Levels (past 3 days)  2024:  5:41 AM Vancomycin 6.6 ug/mL    Vancomycin IV Administrations (past 72 hours)                     vancomycin (VANCOCIN) 1,500 mg in 0.9% NaCl 250 mL intermittent infusion (mg) 1,500 mg New Bag 24 1130    vancomycin (VANCOCIN) 2,500 mg in sodium chloride 0.9 % 500 mL intermittent infusion (mg) 2,500 mg New Bag 24 2310                    Nephrotoxins and other renal medications (From now, onward)      Start     Dose/Rate Route Frequency Ordered Stop    24 1100  vancomycin (VANCOCIN) 1,500 mg in 0.9% NaCl 250 mL intermittent infusion         1,500 mg  over 90 Minutes Intravenous EVERY 12 HOURS 24 2038                 Contrast Orders - past 72 hours (72h ago, onward)      Start     Dose/Rate Route Frequency Stop    24 2230  iopamidol (ISOVUE-370) solution 100 mL         100 mL Intravenous ONCE 24 2247            Interpretation of levels and current regimen:  Vancomycin level is reflective of -600    Has  serum creatinine changed greater than 50% in last 72 hours: No    Urine output:  unable to determine    Renal Function: Stable    Plan:  Continue Current Dose. Vanco dose last night was refused by the patient, so this level this morning is not the best for assessing the current dosing regimen.   Vancomycin monitoring method: AUC  Vancomycin therapeutic monitoring goal: 400-600 mg*h/L  Pharmacy will check vancomycin levels as appropriate in 1-3 Days.  Serum creatinine levels will be ordered every 48 hours.    Aydee Ramírez, PharmD, BCPS

## 2024-05-09 NOTE — PROGRESS NOTES
[facility] General ID Service: Follow Up Note      Patient:  Glenny Campos   Date of birth 1965, Medical record number [id number]  Date of Visit:  05/09/2024  Date of Admission: 5/7/2024         Assessment and Recommendations:   ID Problem List:  Infected plantar ulcer on left foot  Right 3rd digit ulcer with surrounding cellulitis, subtle erosive changes on XR  Hx amputation of left hallux and partial 2nd digit due to osteomyelitis  Chronic osteomyelitis changes of 2nd and 3rd digit of left foot  IDDM, A1c 7.7%  Antibiotic allergies: Zosyn (hives), cephalosporins, cephalexin (rash), clindamycin (rash - was on Zosyn at same time), penicillins (rash, hives)    Recommendations:  Continue levofloxacin 750mg PO q24hrs  Continue metrondiazole 500mg PO TID  Stop vancomycin  Start doxycycline 100mg PO q12hrs  Plan for 10-14 days of antibiotics for skin and soft tissue infection  Follow up in podiatry clinic for re-assessment of wound  ID will sign off at this time, please don't hesitate to contact the [facility] General ID team should further questions arise.    Discussion:  Glenny Campos is a 58 year old female with history of IDDM, previous diabetic foot infection with osteomyelitis, s/p amputation of left hallux and partial amputation of left 2nd toe, dental infection (6/2023) who was admitted from podiatry clinic with left foot plantar ulcer with purulent drainage and cellulitis.     Afebrile, VSS, WBC 8.7 with CRP 30.4. Left foot blister unroofed in podiatry clinic with purulent drainage noted and erythema c/w cellulitis. Culture with MSSA. MRSA nares negative. Previous cultures with K.oxytoca, CoNS, MSSA, Pseudomonas, and anaerobes. Patient has several antimicrobial allergies. Recommend continuing levofloxacin as patient has history of strep and agents used for MSSA don't provide excellent strep coverage; this will also provide some gram negative coverage as she has history of Klebsiella and more  remotely, Pseudomonas (though no gram negatives isolated this admission). Anaerobic culture is in progress- will keep metronidazole. Recommend doxycycline for coverage of MSSA. Can continue for 10-14 days of treatment for skin and soft tissue infection without abscess or new bone erosive changes at site of ulcer.       Recs were discussed with primary team today. Don't hesitate to call with questions.     Attestation:  I have reviewed today's vital signs, medications, labs and imaging.    Andria Diane PA-C  Pronouns: she/her/hers  Infectious Diseases  Contact via [employer] or [employer] Paging/Directory        50 MINUTES SPENT BY ME on the date of service doing chart review, history, exam, documentation & further activities per the note.             Interval History:       Afebrile, VSS, WBC 5.1. Cx from wound swab with MSSA. Requesting to transition to PO regimen and hopes to go home soon. Did not tolerate vancomycin well before states she had an itchy scalp and tingling and out of it. No new symptoms today, denies fever, chills, vomiting, diarrhea. Redness on foot and leg improving.     Reviewed that current cultures have MSSA only. Requested list of bacteria that have grown from her wounds in the past- provided list of main/repeatedly isolated organisms.         Current Antimicrobials   Current:  - levofloxacin 5/7 - present  - metronidazole 5/7 - present  - vancomycin 5/7 - present         History of Present Illness:    Glenny Campos is a 58 year old female with history of IDDM, previous diabetic foot infection with osteomyelitis, s/p amputation of left hallux (2020) and partial amputation of left 2nd toe, dental infection (6/2023) who was admitted from podiatry clinic with left foot plantar ulcer with purulent drainage and cellulitis.     Plantar wound started as a smaller blister about a week ago. The area grew and became increasingly swollen with red surrounding skin. Patient reports that she was standing for  several hours with bad shoes on. Has a prescription for medical shoe but had not picked it up yet. No wounds, punctures, abrasions that she is aware of. Reports chills. No fevers, rigors, sweats. Had nausea, vomiting and diarrhea after receiving doses of all 3 antibiotics last night. Feeling better this morning- has some mild nausea, no vomiting, abdominal pain or diarrhea today. Denies cough, shortness of breath, joint pain, skin rashes, urinary symptoms.     Does not soak feet. Has not been in pools, hot tubs, or lates for >6 months. No pets at home. Is around horses. Travels for work- last trip was in September. Only international travel was a cruise to [facility] and [facility] in February 2020.      Historical cultures reviewed:  5/2023 left 2nd toe: Klebsiella oxytoca/Raoultella ornithinolytica, Strep agalactiae (group B strep), MSSA, and H.parainfluenzae   11/2020 Left great toe: MSSA, CoNS, Finegoldia magna,   10/2020 Left great toe: Pseudomonas aeruginosa (pan-S), Finegoldia magna, CoNS  9/2020 Left great toe: E.faecalis (amp S), CoNS, and Lelliottia amnigena, GPB resembling Corynebacterium, Pseudomonas aeruginosa  8/2020 Left great toe: MSSA, GBS, Peptostreptococcus, Porphyromonas species, Prevotella disiens  6/2017 unknown source: GAS, MSSA, anaerobic GPC  5/2017 unknown source: MSSA, Haemophilus species not H.influenzae, Strep mitis, mixed lena  4/2017 unknown source: Strep mitis, Strep anginosus, CoNS  8/2016 unknown source: mixed anaerobes, CoNS, GPB resembling Corynebacterium.         Physical Exam:   Ranges for vital signs:  Temp:  [98  F (36.7  C)-98.4  F (36.9  C)] 98.1  F (36.7  C)  Pulse:  [67-71] 71  Resp:  [18] 18  BP: (102-133)/(40-57) 127/55  SpO2:  [96 %-99 %] 99 %  No intake or output data in the 24 hours ending 05/09/24 1014  Exam:  GENERAL:  awake. Alert. Lying on right side in bed in NAD.   ENT:  Head is normocephalic, atraumatic. Oropharynx is moist without exudates or ulcers.  EYES:  Eyes  have anicteric sclerae.    LUNGS:  Normal respiratory effort on RA  EXT: Extremities warm, +DP pulses  SKIN:  No acute rashes. Erythema on LLE is limited to a 4cm circular patch on anterior/lateral area with induration and no fluctuance. Plantar aspect of foot is no longer erythematous. Mepilex in place over ulcer. Line is in place without any surrounding erythema.           Laboratory Data:   Reviewed.  Pertinent for:    Culture data:  Culture   Date Value Ref Range Status   05/07/2024 No growth after 1 day  Preliminary   05/07/2024 No growth after 1 day  Preliminary   05/07/2024 No anaerobic organisms isolated after 1 day  Preliminary   05/07/2024 4+ Staphylococcus aureus (A)  Final   06/28/2023 No Growth  Final   06/28/2023 No Growth  Final   06/27/2023 No Growth  Final   06/26/2023 No Growth  Final   06/25/2023 No Growth  Final   06/24/2023 No Growth  Final   06/24/2023 No Growth  Final   06/24/2023 4+ Actinomyces odontolyticus (A)  Final     Comment:     Susceptibilities not routinely done, refer to antibiogram to view typical susceptibility profiles   06/24/2023 3+ Streptococcus intermedius (A)  Final     Comment:     Not isolated or reported on routine aerobic cultureThis organism is susceptible to ampicillin, penicillin, vancomycin and the cephalosporins. If treatment is required and your patient is allergic to penicillin, contact the microbiology lab within 5   days to request susceptibility testing.   06/24/2023 4+ Streptococcus anginosus (A)  Final     Comment:     This organism is susceptible to ampicillin, penicillin, vancomycin and the cephalosporins. If treatment is required and your patient is allergic to penicillin, contact the microbiology lab within 5 days to request susceptibility testing.Beta hemolyt  ic strain   06/24/2023 4+ Streptococcus mitis group (A)  Final     Comment:     This organism is susceptible to ampicillin, penicillin, vancomycin and the cephalosporins. If treatment is required  and your patient is allergic to penicillin, contact the microbiology lab within 5 days to request susceptibility testing.   06/24/2023 4+ Streptococcus anginosus (A)  Final     Comment:     This organism is susceptible to ampicillin, penicillin, vancomycin and the cephalosporins. If treatment is required and your patient is allergic to penicillin, contact the microbiology lab within 5 days to request susceptibility testing.   06/24/2023 4+ Actinomyces odontolyticus (A)  Corrected     Comment:     Susceptibilities not routinely done, refer to antibiogram to view typical susceptibility profiles.  See susceptibility testing on Anaerobic culture.   06/23/2023 Positive on the 1st day of incubation (A)  Final   06/23/2023 Staphylococcus epidermidis (AA)  Final     Comment:     1 of 2 bottles   06/23/2023 Positive on the 1st day of incubation (A)  Final   06/23/2023 Staphylococcus epidermidis (AA)  Final     Comment:     2 of 2 bottlesSusceptibilities done on previous cultures   06/23/2023 No Growth  Final       Inflammatory Markers    Recent Labs   Lab Test 05/09/24  0541 05/07/24  1506 06/23/23  1422 06/23/23  1032   SED  --  21 40*  --    CRPI 37.09* 30.40*  --  316.00*       Hematology Studies    Recent Labs   Lab Test 05/09/24  0541 05/07/24  1506 06/28/23  0815 06/27/23  0943   WBC 5.1 8.7 5.2 5.2   HGB 12.1 13.6 12.5 13.0   MCV 91 89 92 93    232 261 294     No lab results found.    Metabolic Studies     Recent Labs   Lab Test 05/09/24  0541 05/08/24  0649 05/07/24  1506 07/01/23  0543 06/30/23  0833 06/29/23  0639   NA  --   --  141 149* 145 141   POTASSIUM  --   --  4.4 3.4 3.6 3.5   CHLORIDE  --   --  109* 111* 109* 106   CO2  --   --  22 24 23 25   BUN  --   --  8.6 6.5 5.9* 5.0*   CR 0.59 0.56 0.60 0.56 0.49* 0.50*   GFRESTIMATED >90 >90 >90 >90 >90 >90       Hepatic Studies    Recent Labs   Lab Test 05/07/24  1506 06/27/23  0942 06/26/23  0704   BILITOTAL 0.3 0.2 0.2   ALKPHOS 92 92 88   ALBUMIN 4.1 3.6  3.3*   AST 22 16 20   ALT 12 12 11            Imaging:     CT Left foot with tibia fibula w/ contrast 5/7/24  IMPRESSION:  1.  Erosive changes of the second proximal phalanx head as well as the third middle and distal phalanx, suspicious for osteomyelitis, although may be chronic. If clinical concern for acute osteomyelitis, MRI may be considered.     2.  Status post amputation of the first digit at the metatarsophalangeal joint, without definite evidence of acute osteomyelitis.     3.  Diffuse subcutaneous edema of the leg, ankle, and foot. No abscess. No soft tissue gas.     CXR 5/7/24  IMPRESSION: No acute airspace disease.      XR toe right 5/7/24  IMPRESSION: Skin defect at the third toe with question of subtle  erosive changes involving the distal tuft of the right third toe.     XR foot left 5/7/24  IMPRESSION: Fairly stable postoperative changes of amputation  involving the distal first metatarsal and great toe as well as partial  amputation of the left second toe.

## 2024-05-10 ENCOUNTER — OFFICE VISIT (OUTPATIENT)
Dept: ORTHOPEDICS | Facility: CLINIC | Age: 59
End: 2024-05-10
Payer: COMMERCIAL

## 2024-05-10 DIAGNOSIS — E11.628 DIABETIC FOOT INFECTION (H): ICD-10-CM

## 2024-05-10 DIAGNOSIS — E11.49 TYPE II OR UNSPECIFIED TYPE DIABETES MELLITUS WITH NEUROLOGICAL MANIFESTATIONS, NOT STATED AS UNCONTROLLED(250.60) (H): ICD-10-CM

## 2024-05-10 DIAGNOSIS — L08.9 DIABETIC FOOT INFECTION (H): ICD-10-CM

## 2024-05-10 DIAGNOSIS — L97.522 ULCER OF FOOT, LEFT, WITH FAT LAYER EXPOSED (H): Primary | ICD-10-CM

## 2024-05-10 DIAGNOSIS — L97.512 SKIN ULCER OF TOE OF RIGHT FOOT WITH FAT LAYER EXPOSED (H): ICD-10-CM

## 2024-05-10 LAB — BACTERIA WND CULT: NORMAL

## 2024-05-10 PROCEDURE — 99213 OFFICE O/P EST LOW 20 MIN: CPT | Performed by: PODIATRIST

## 2024-05-10 NOTE — LETTER
5/10/2024         RE: Glenny Campos  [address] [facility] [address]        Dear Colleague,    Thank you for referring your patient, Glenny Campos, to the [facility] [place]. Please see a copy of my visit note below.    Chief Complaint   Patient presents with    Follow Up      Left plantar foot ulcer and cellulitis.             HPI: Glenny is a 58 year old female who presents today for further evaluation of left plantar foot ulcer and cellulitis. She also has an ulcer on the left plantar 3rd digit. She notes that these started about a week ago. She was standing for may hours at a horse show.     Interval hx: I sent her to the hospital earlier this week and they initiated IV abx she has been subsequently discharged. Using Silvercel on the foot. She is currently on abx. CT showed no abscess.       Review of Systems: No n/v/d/f/c/ns/sob/cp    PMH:   Past Medical History:   Diagnosis Date    Diabetic foot ulcer (H)     Osteomyelitis (H)     Type 2 diabetes mellitus with diabetic polyneuropathy (H)        PSxH:   Past Surgical History:   Procedure Laterality Date    Amputation hallux Left     APPENDECTOMY  1992     SECTION      ,        Allergies: Piperacillin-tazobactam in dex, Cephalosporins, Statins, Cephalexin, Clindamycin, and Penicillins    SH:   Social History     Socioeconomic History    Marital status:      Spouse name: Not on file    Number of children: Not on file    Years of education: Not on file    Highest education level: Not on file   Occupational History    Not on file   Tobacco Use    Smoking status: Never    Smokeless tobacco: Never   Vaping Use    Vaping status: Never Used   Substance and Sexual Activity    Alcohol use: Not on file    Drug use: Not on file    Sexual activity: Not on file   Other Topics Concern    Not on file   Social History Narrative    Not on file     Social Determinants of Health     Financial Resource Strain:  "Not on file   Food Insecurity: Not on file   Transportation Needs: Not on file   Physical Activity: Not on file   Stress: Not on file   Social Connections: Not on file   Interpersonal Safety: Not on file   Housing Stability: Not on file       FH:   Family History   Problem Relation Age of Onset    Colon Cancer Mother     Glaucoma Mother     Diabetes Type 2  Mother     Dementia Father     Heart Failure Maternal Grandmother     Cerebrovascular Disease Maternal Grandfather        Objective:  97.4 P- 75 R- 22 BP - 123/64 97% O2    CRP Inflammation   Date Value Ref Range Status   05/09/2024 37.09 (H) <5.00 mg/L Final     Erythrocyte Sedimentation Rate   Date Value Ref Range Status   05/07/2024 21 0 - 30 mm/hr Final     Lab Results   Component Value Date    WBC 8.7 05/07/2024     Lab Results   Component Value Date    RBC 4.34 05/07/2024     Lab Results   Component Value Date    HGB 13.6 05/07/2024     Lab Results   Component Value Date    HCT 38.7 05/07/2024     No components found for: \"MCT\"  Lab Results   Component Value Date    MCV 89 05/07/2024     Lab Results   Component Value Date    MCH 31.3 05/07/2024     Lab Results   Component Value Date    MCHC 35.1 05/07/2024     Lab Results   Component Value Date    RDW 12.1 05/07/2024     Lab Results   Component Value Date     05/07/2024     Last Comprehensive Metabolic Panel:  Sodium   Date Value Ref Range Status   05/07/2024 141 135 - 145 mmol/L Final     Comment:     Reference intervals for this test were updated on 09/26/2023 to more accurately reflect our healthy population. There may be differences in the flagging of prior results with similar values performed with this method. Interpretation of those prior results can be made in the context of the updated reference intervals.      Potassium   Date Value Ref Range Status   05/07/2024 4.4 3.4 - 5.3 mmol/L Final     Chloride   Date Value Ref Range Status   05/07/2024 109 (H) 98 - 107 mmol/L Final     Carbon " Dioxide (CO2)   Date Value Ref Range Status   05/07/2024 22 22 - 29 mmol/L Final     Anion Gap   Date Value Ref Range Status   05/07/2024 10 7 - 15 mmol/L Final     GLUCOSE BY METER POCT   Date Value Ref Range Status   05/09/2024 144 (H) 70 - 99 mg/dL Final     Urea Nitrogen   Date Value Ref Range Status   05/07/2024 8.6 6.0 - 20.0 mg/dL Final     Creatinine   Date Value Ref Range Status   05/09/2024 0.59 0.51 - 0.95 mg/dL Final     GFR Estimate   Date Value Ref Range Status   05/09/2024 >90 >60 mL/min/1.73m2 Final     Calcium   Date Value Ref Range Status   05/07/2024 9.0 8.6 - 10.0 mg/dL Final     Bilirubin Total   Date Value Ref Range Status   05/07/2024 0.3 <=1.2 mg/dL Final     Alkaline Phosphatase   Date Value Ref Range Status   05/07/2024 92 40 - 150 U/L Final     Comment:     Reference intervals for this test were updated on 11/14/2023 to more accurately reflect our healthy population. There may be differences in the flagging of prior results with similar values performed with this method. Interpretation of those prior results can be made in the context of the updated reference intervals.     ALT   Date Value Ref Range Status   05/07/2024 12 0 - 50 U/L Final     Comment:     Reference intervals for this test were updated on 6/12/2023 to more accurately reflect our healthy population. There may be differences in the flagging of prior results with similar values performed with this method. Interpretation of those prior results can be made in the context of the updated reference intervals.       AST   Date Value Ref Range Status   05/07/2024 22 0 - 45 U/L Final     Comment:     Reference intervals for this test were updated on 6/12/2023 to more accurately reflect our healthy population. There may be differences in the flagging of prior results with similar values performed with this method. Interpretation of those prior results can be made in the context of the updated reference intervals.                   Hgb  A1C  Order: [id number]  Component  Ref Range & Units 1 mo ago   Hemoglobin A1C (Rapid)  <=5.6 % 8.5 High    Performing Location [facility]   Estimated Average Glucose (Calc)  < 117 mg/dL 197   Comment: Estimated average glucose (eAG) converts A1c into glucose units (mg/dL) and estimates average glucose over the past approximately 3 months.  The eAG reference interval (<117 mg/dL) corresponds to an A1c of <5.7%.   Resulting Agency [facility]   Narrative  Performed by [facility]  For patients not previously diagnosed with diabetes:    DP and PT pulses 2/4. CRT 1 second. Diminished pedal hair.  Gross sensation is severely diminished.   Amp of the left hallux and partial 2nd digit. Bullae noted on the plantar left foot. THis was deroofed and te following wound was noted:        A wound is noted at left  plantar foot measuring 3cm x 5cm x 0.1cm.    Ross Classification: 2    Wound base: Red  White/Granulation  maceration    Edges: intact    Drainage: small/serous and purulent    Odor: no    Undermining: no    Bone Exposure: No    Clinical Signs of Infection: Yes: surrounding cellulitis    After obtaining patient consent, the wound was irrigated with copious amounts of saline. A scalpel was then used to debride the wound into subcutaneous tissue. The wound edges were debrided back to healthy, bleeding tissue. The wound base exhibited healthy bleeding. Given the patient's lack of sensation, no anesthesia was necessary for the procedure.  __________________  Wound #2        A wound is noted at right  planar 3rd digit measuring 1cm x 0.7cm x 0.1cm.    Ross Classification: 2    Wound base: Red/Granulation    Edges: hyperkeratotic     Drainage: scant/serous    Odor: no    Undermining: no    Bone Exposure: No    Clinical Signs of Infection: Yes: cellulitis of the toe.     After obtaining patient consent, the wound was irrigated with copious amounts of saline. A scalpel was then used to debride the  wound into subcutaneous tissue. The wound edges were debrided back to healthy, bleeding tissue. The wound base exhibited healthy bleeding. Given the patient's lack of sensation, no anesthesia was necessary for the procedure.    Barriers to Healing: Both wounds are in areas of high pressure. High A1c. Previous delayed wound healing and amputations.     Treatment Plan: Silvercel and Mepilex on the left. Primapore on the right.     Pt Ability to Follow Plan: moderate.         Assessment: Glenny is a 59 YO with left plantar foot ulceration and right digital amputation. Wound are drying out and improving.     Plan:  - Pt seen and evaluated.  - Start dressings as above.   - Cont abx as directed by ID.  - Cont DH shoe.  - See again in 1 week.            Tae Chavira DPM

## 2024-05-10 NOTE — PROGRESS NOTES
Chief Complaint   Patient presents with    Follow Up      Left plantar foot ulcer and cellulitis.             HPI: Glenny is a 58 year old female who presents today for further evaluation of left plantar foot ulcer and cellulitis. She also has an ulcer on the left plantar 3rd digit. She notes that these started about a week ago. She was standing for may hours at a horse show.     Interval hx: I sent her to the hospital earlier this week and they initiated IV abx she has been subsequently discharged. Using Silvercel on the foot. She is currently on abx. CT showed no abscess.       Review of Systems: No n/v/d/f/c/ns/sob/cp    PMH:   Past Medical History:   Diagnosis Date    Diabetic foot ulcer (H)     Osteomyelitis (H)     Type 2 diabetes mellitus with diabetic polyneuropathy (H)        PSxH:   Past Surgical History:   Procedure Laterality Date    Amputation hallux Left     APPENDECTOMY  1992     SECTION      ,        Allergies: Piperacillin-tazobactam in dex, Cephalosporins, Statins, Cephalexin, Clindamycin, and Penicillins    SH:   Social History     Socioeconomic History    Marital status:      Spouse name: Not on file    Number of children: Not on file    Years of education: Not on file    Highest education level: Not on file   Occupational History    Not on file   Tobacco Use    Smoking status: Never    Smokeless tobacco: Never   Vaping Use    Vaping status: Never Used   Substance and Sexual Activity    Alcohol use: Not on file    Drug use: Not on file    Sexual activity: Not on file   Other Topics Concern    Not on file   Social History Narrative    Not on file     Social Determinants of Health     Financial Resource Strain: Not on file   Food Insecurity: Not on file   Transportation Needs: Not on file   Physical Activity: Not on file   Stress: Not on file   Social Connections: Not on file   Interpersonal Safety: Not on file   Housing Stability: Not on file       FH:   Family History  "  Problem Relation Age of Onset    Colon Cancer Mother     Glaucoma Mother     Diabetes Type 2  Mother     Dementia Father     Heart Failure Maternal Grandmother     Cerebrovascular Disease Maternal Grandfather        Objective:  97.4 P- 75 R- 22 BP - 123/64 97% O2    CRP Inflammation   Date Value Ref Range Status   05/09/2024 37.09 (H) <5.00 mg/L Final     Erythrocyte Sedimentation Rate   Date Value Ref Range Status   05/07/2024 21 0 - 30 mm/hr Final     Lab Results   Component Value Date    WBC 8.7 05/07/2024     Lab Results   Component Value Date    RBC 4.34 05/07/2024     Lab Results   Component Value Date    HGB 13.6 05/07/2024     Lab Results   Component Value Date    HCT 38.7 05/07/2024     No components found for: \"MCT\"  Lab Results   Component Value Date    MCV 89 05/07/2024     Lab Results   Component Value Date    MCH 31.3 05/07/2024     Lab Results   Component Value Date    MCHC 35.1 05/07/2024     Lab Results   Component Value Date    RDW 12.1 05/07/2024     Lab Results   Component Value Date     05/07/2024     Last Comprehensive Metabolic Panel:  Sodium   Date Value Ref Range Status   05/07/2024 141 135 - 145 mmol/L Final     Comment:     Reference intervals for this test were updated on 09/26/2023 to more accurately reflect our healthy population. There may be differences in the flagging of prior results with similar values performed with this method. Interpretation of those prior results can be made in the context of the updated reference intervals.      Potassium   Date Value Ref Range Status   05/07/2024 4.4 3.4 - 5.3 mmol/L Final     Chloride   Date Value Ref Range Status   05/07/2024 109 (H) 98 - 107 mmol/L Final     Carbon Dioxide (CO2)   Date Value Ref Range Status   05/07/2024 22 22 - 29 mmol/L Final     Anion Gap   Date Value Ref Range Status   05/07/2024 10 7 - 15 mmol/L Final     GLUCOSE BY METER POCT   Date Value Ref Range Status   05/09/2024 144 (H) 70 - 99 mg/dL Final     Urea " Nitrogen   Date Value Ref Range Status   05/07/2024 8.6 6.0 - 20.0 mg/dL Final     Creatinine   Date Value Ref Range Status   05/09/2024 0.59 0.51 - 0.95 mg/dL Final     GFR Estimate   Date Value Ref Range Status   05/09/2024 >90 >60 mL/min/1.73m2 Final     Calcium   Date Value Ref Range Status   05/07/2024 9.0 8.6 - 10.0 mg/dL Final     Bilirubin Total   Date Value Ref Range Status   05/07/2024 0.3 <=1.2 mg/dL Final     Alkaline Phosphatase   Date Value Ref Range Status   05/07/2024 92 40 - 150 U/L Final     Comment:     Reference intervals for this test were updated on 11/14/2023 to more accurately reflect our healthy population. There may be differences in the flagging of prior results with similar values performed with this method. Interpretation of those prior results can be made in the context of the updated reference intervals.     ALT   Date Value Ref Range Status   05/07/2024 12 0 - 50 U/L Final     Comment:     Reference intervals for this test were updated on 6/12/2023 to more accurately reflect our healthy population. There may be differences in the flagging of prior results with similar values performed with this method. Interpretation of those prior results can be made in the context of the updated reference intervals.       AST   Date Value Ref Range Status   05/07/2024 22 0 - 45 U/L Final     Comment:     Reference intervals for this test were updated on 6/12/2023 to more accurately reflect our healthy population. There may be differences in the flagging of prior results with similar values performed with this method. Interpretation of those prior results can be made in the context of the updated reference intervals.                   Hgb A1C  Order: [id number]  Component  Ref Range & Units 1 mo ago   Hemoglobin A1C (Rapid)  <=5.6 % 8.5 High    Performing Location [facility]   Estimated Average Glucose (Calc)  < 117 mg/dL 197   Comment: Estimated average glucose (eAG) converts A1c into glucose units  (mg/dL) and estimates average glucose over the past approximately 3 months.  The eAG reference interval (<117 mg/dL) corresponds to an A1c of <5.7%.   Resulting Agency [facility]   Narrative  Performed by [facility]  For patients not previously diagnosed with diabetes:    DP and PT pulses 2/4. CRT 1 second. Diminished pedal hair.  Gross sensation is severely diminished.   Amp of the left hallux and partial 2nd digit. Bullae noted on the plantar left foot. THis was deroofed and te following wound was noted:        A wound is noted at left  plantar foot measuring 3cm x 5cm x 0.1cm.    Ross Classification: 2    Wound base: Red  White/Granulation  maceration    Edges: intact    Drainage: small/serous and purulent    Odor: no    Undermining: no    Bone Exposure: No    Clinical Signs of Infection: Yes: surrounding cellulitis    After obtaining patient consent, the wound was irrigated with copious amounts of saline. A scalpel was then used to debride the wound into subcutaneous tissue. The wound edges were debrided back to healthy, bleeding tissue. The wound base exhibited healthy bleeding. Given the patient's lack of sensation, no anesthesia was necessary for the procedure.  __________________  Wound #2        A wound is noted at right  planar 3rd digit measuring 1cm x 0.7cm x 0.1cm.    Ross Classification: 2    Wound base: Red/Granulation    Edges: hyperkeratotic     Drainage: scant/serous    Odor: no    Undermining: no    Bone Exposure: No    Clinical Signs of Infection: Yes: cellulitis of the toe.     After obtaining patient consent, the wound was irrigated with copious amounts of saline. A scalpel was then used to debride the wound into subcutaneous tissue. The wound edges were debrided back to healthy, bleeding tissue. The wound base exhibited healthy bleeding. Given the patient's lack of sensation, no anesthesia was necessary for the procedure.    Barriers to Healing: Both wounds  are in areas of high pressure. High A1c. Previous delayed wound healing and amputations.     Treatment Plan: Silvercel and Mepilex on the left. Primapore on the right.     Pt Ability to Follow Plan: moderate.         Assessment: Glenny is a 59 YO with left plantar foot ulceration and right digital amputation. Wound are drying out and improving.     Plan:  - Pt seen and evaluated.  - Start dressings as above.   - Cont abx as directed by ID.  - Cont DH shoe.  - See again in 1 week.

## 2024-05-12 LAB
BACTERIA BLD CULT: NO GROWTH
BACTERIA BLD CULT: NO GROWTH

## 2024-05-14 LAB
BACTERIA BLD CULT: NO GROWTH
BACTERIA BLD CULT: NO GROWTH

## 2024-05-15 ENCOUNTER — DOCUMENTATION ONLY (OUTPATIENT)
Dept: ORTHOPEDICS | Facility: CLINIC | Age: 59
End: 2024-05-15
Payer: COMMERCIAL

## 2024-05-15 NOTE — PROGRESS NOTES
Received Completed forms Yes   Faxed Forms Emailed to patient at:   [email or web address]  and [email or web address]   Sent to HIM (Date) 5/15/24

## 2024-05-17 ENCOUNTER — OFFICE VISIT (OUTPATIENT)
Dept: ORTHOPEDICS | Facility: CLINIC | Age: 59
End: 2024-05-17
Payer: COMMERCIAL

## 2024-05-17 DIAGNOSIS — L97.512 SKIN ULCER OF TOE OF RIGHT FOOT WITH FAT LAYER EXPOSED (H): ICD-10-CM

## 2024-05-17 DIAGNOSIS — L97.522 ULCER OF FOOT, LEFT, WITH FAT LAYER EXPOSED (H): Primary | ICD-10-CM

## 2024-05-17 DIAGNOSIS — E11.49 TYPE II OR UNSPECIFIED TYPE DIABETES MELLITUS WITH NEUROLOGICAL MANIFESTATIONS, NOT STATED AS UNCONTROLLED(250.60) (H): ICD-10-CM

## 2024-05-17 PROCEDURE — 99213 OFFICE O/P EST LOW 20 MIN: CPT | Performed by: PODIATRIST

## 2024-05-17 NOTE — PROGRESS NOTES
Chief Complaint   Patient presents with    Follow Up     Ulcer, left foot.            HPI: Glenny is a 58 year old female who presents today for further evaluation of left plantar foot ulcer and cellulitis. She also has an ulcer on the left plantar 3rd digit. She notes that these started about a week ago. She was standing for may hours at a horse show.     Interval hx: She conts with the abx. Betadine and Mepilex to the foot.       Review of Systems: No n/v/d/f/c/ns/sob/cp    PMH:   Past Medical History:   Diagnosis Date    Diabetic foot ulcer (H)     Osteomyelitis (H)     Type 2 diabetes mellitus with diabetic polyneuropathy (H)        PSxH:   Past Surgical History:   Procedure Laterality Date    Amputation hallux Left     APPENDECTOMY  1992     SECTION      ,        Allergies: Piperacillin-tazobactam in dex, Cephalosporins, Statins, Cephalexin, Clindamycin, and Penicillins    SH:   Social History     Socioeconomic History    Marital status:      Spouse name: Not on file    Number of children: Not on file    Years of education: Not on file    Highest education level: Not on file   Occupational History    Not on file   Tobacco Use    Smoking status: Never    Smokeless tobacco: Never   Vaping Use    Vaping status: Never Used   Substance and Sexual Activity    Alcohol use: Not on file    Drug use: Not on file    Sexual activity: Not on file   Other Topics Concern    Not on file   Social History Narrative    Not on file     Social Determinants of Health     Financial Resource Strain: Not on file   Food Insecurity: Not on file   Transportation Needs: Not on file   Physical Activity: Not on file   Stress: Not on file   Social Connections: Not on file   Interpersonal Safety: Not on file   Housing Stability: Not on file       FH:   Family History   Problem Relation Age of Onset    Colon Cancer Mother     Glaucoma Mother     Diabetes Type 2  Mother     Dementia Father     Heart Failure Maternal  "Grandmother     Cerebrovascular Disease Maternal Grandfather        Objective:  97.4 P- 75 R- 22 BP - 123/64 97% O2    CRP Inflammation   Date Value Ref Range Status   05/09/2024 37.09 (H) <5.00 mg/L Final     Erythrocyte Sedimentation Rate   Date Value Ref Range Status   05/07/2024 21 0 - 30 mm/hr Final     Lab Results   Component Value Date    WBC 8.7 05/07/2024     Lab Results   Component Value Date    RBC 4.34 05/07/2024     Lab Results   Component Value Date    HGB 13.6 05/07/2024     Lab Results   Component Value Date    HCT 38.7 05/07/2024     No components found for: \"MCT\"  Lab Results   Component Value Date    MCV 89 05/07/2024     Lab Results   Component Value Date    MCH 31.3 05/07/2024     Lab Results   Component Value Date    MCHC 35.1 05/07/2024     Lab Results   Component Value Date    RDW 12.1 05/07/2024     Lab Results   Component Value Date     05/07/2024     Last Comprehensive Metabolic Panel:  Sodium   Date Value Ref Range Status   05/07/2024 141 135 - 145 mmol/L Final     Comment:     Reference intervals for this test were updated on 09/26/2023 to more accurately reflect our healthy population. There may be differences in the flagging of prior results with similar values performed with this method. Interpretation of those prior results can be made in the context of the updated reference intervals.      Potassium   Date Value Ref Range Status   05/07/2024 4.4 3.4 - 5.3 mmol/L Final     Chloride   Date Value Ref Range Status   05/07/2024 109 (H) 98 - 107 mmol/L Final     Carbon Dioxide (CO2)   Date Value Ref Range Status   05/07/2024 22 22 - 29 mmol/L Final     Anion Gap   Date Value Ref Range Status   05/07/2024 10 7 - 15 mmol/L Final     GLUCOSE BY METER POCT   Date Value Ref Range Status   05/09/2024 144 (H) 70 - 99 mg/dL Final     Urea Nitrogen   Date Value Ref Range Status   05/07/2024 8.6 6.0 - 20.0 mg/dL Final     Creatinine   Date Value Ref Range Status   05/09/2024 0.59 0.51 - 0.95 " mg/dL Final     GFR Estimate   Date Value Ref Range Status   05/09/2024 >90 >60 mL/min/1.73m2 Final     Calcium   Date Value Ref Range Status   05/07/2024 9.0 8.6 - 10.0 mg/dL Final     Bilirubin Total   Date Value Ref Range Status   05/07/2024 0.3 <=1.2 mg/dL Final     Alkaline Phosphatase   Date Value Ref Range Status   05/07/2024 92 40 - 150 U/L Final     Comment:     Reference intervals for this test were updated on 11/14/2023 to more accurately reflect our healthy population. There may be differences in the flagging of prior results with similar values performed with this method. Interpretation of those prior results can be made in the context of the updated reference intervals.     ALT   Date Value Ref Range Status   05/07/2024 12 0 - 50 U/L Final     Comment:     Reference intervals for this test were updated on 6/12/2023 to more accurately reflect our healthy population. There may be differences in the flagging of prior results with similar values performed with this method. Interpretation of those prior results can be made in the context of the updated reference intervals.       AST   Date Value Ref Range Status   05/07/2024 22 0 - 45 U/L Final     Comment:     Reference intervals for this test were updated on 6/12/2023 to more accurately reflect our healthy population. There may be differences in the flagging of prior results with similar values performed with this method. Interpretation of those prior results can be made in the context of the updated reference intervals.                   Hgb A1C  Order: [id number]  Component  Ref Range & Units 1 mo ago   Hemoglobin A1C (Rapid)  <=5.6 % 8.5 High    Performing Location [facility]   Estimated Average Glucose (Calc)  < 117 mg/dL 197   Comment: Estimated average glucose (eAG) converts A1c into glucose units (mg/dL) and estimates average glucose over the past approximately 3 months.  The eAG reference interval (<117 mg/dL) corresponds to an A1c of <5.7%.   Resulting  [facility]   Narrative  Performed by [facility]  For patients not previously diagnosed with diabetes:    DP and PT pulses 2/4. CRT 1 second. Diminished pedal hair.  Gross sensation is severely diminished.   Amp of the left hallux and partial 2nd digit. Bullae noted on the plantar left foot. THis was deroofed and te following wound was noted:        A wound is noted at left  plantar foot is healed and crusted. No s/s of acute infection.   __________________  Wound #2        A wound is noted at right  planar 3rd digit is healed and crusted. No s/s of infection.     Barriers to Healing: Both wounds are in areas of high pressure. High A1c. Previous delayed wound healing and amputations.     Treatment Plan: Mepilex on the left. Primapore on the right.     Pt Ability to Follow Plan: moderate.         Assessment: Glenny is a 59 YO with left plantar foot ulceration and right digital amputation. Wound are drying out and improving.     Plan:  - Pt seen and evaluated.  - Start dressings as above.   - Cont abx as directed by ID.  - Cont DH shoe.  - See again in 4 weeks.

## 2024-05-17 NOTE — LETTER
2024         RE: Glenny Campos  [address] [place] [facility] [address]        Dear Colleague,    Thank you for referring your patient, Glenny Campos, to the [facility] [place]. Please see a copy of my visit note below.    Chief Complaint   Patient presents with    Follow Up     Ulcer, left foot.            HPI: Glenny is a 58 year old female who presents today for further evaluation of left plantar foot ulcer and cellulitis. She also has an ulcer on the left plantar 3rd digit. She notes that these started about a week ago. She was standing for may hours at a horse show.     Interval hx: She conts with the abx. Betadine and Mepilex to the foot.       Review of Systems: No n/v/d/f/c/ns/sob/cp    PMH:   Past Medical History:   Diagnosis Date    Diabetic foot ulcer (H)     Osteomyelitis (H)     Type 2 diabetes mellitus with diabetic polyneuropathy (H)        PSxH:   Past Surgical History:   Procedure Laterality Date    Amputation hallux Left     APPENDECTOMY  1992     SECTION      ,        Allergies: Piperacillin-tazobactam in dex, Cephalosporins, Statins, Cephalexin, Clindamycin, and Penicillins    SH:   Social History     Socioeconomic History    Marital status:      Spouse name: Not on file    Number of children: Not on file    Years of education: Not on file    Highest education level: Not on file   Occupational History    Not on file   Tobacco Use    Smoking status: Never    Smokeless tobacco: Never   Vaping Use    Vaping status: Never Used   Substance and Sexual Activity    Alcohol use: Not on file    Drug use: Not on file    Sexual activity: Not on file   Other Topics Concern    Not on file   Social History Narrative    Not on file     Social Determinants of Health     Financial Resource Strain: Not on file   Food Insecurity: Not on file   Transportation Needs: Not on file   Physical Activity: Not on file   Stress: Not on file   Social  "Connections: Not on file   Interpersonal Safety: Not on file   Housing Stability: Not on file       FH:   Family History   Problem Relation Age of Onset    Colon Cancer Mother     Glaucoma Mother     Diabetes Type 2  Mother     Dementia Father     Heart Failure Maternal Grandmother     Cerebrovascular Disease Maternal Grandfather        Objective:  97.4 P- 75 R- 22 BP - 123/64 97% O2    CRP Inflammation   Date Value Ref Range Status   05/09/2024 37.09 (H) <5.00 mg/L Final     Erythrocyte Sedimentation Rate   Date Value Ref Range Status   05/07/2024 21 0 - 30 mm/hr Final     Lab Results   Component Value Date    WBC 8.7 05/07/2024     Lab Results   Component Value Date    RBC 4.34 05/07/2024     Lab Results   Component Value Date    HGB 13.6 05/07/2024     Lab Results   Component Value Date    HCT 38.7 05/07/2024     No components found for: \"MCT\"  Lab Results   Component Value Date    MCV 89 05/07/2024     Lab Results   Component Value Date    MCH 31.3 05/07/2024     Lab Results   Component Value Date    MCHC 35.1 05/07/2024     Lab Results   Component Value Date    RDW 12.1 05/07/2024     Lab Results   Component Value Date     05/07/2024     Last Comprehensive Metabolic Panel:  Sodium   Date Value Ref Range Status   05/07/2024 141 135 - 145 mmol/L Final     Comment:     Reference intervals for this test were updated on 09/26/2023 to more accurately reflect our healthy population. There may be differences in the flagging of prior results with similar values performed with this method. Interpretation of those prior results can be made in the context of the updated reference intervals.      Potassium   Date Value Ref Range Status   05/07/2024 4.4 3.4 - 5.3 mmol/L Final     Chloride   Date Value Ref Range Status   05/07/2024 109 (H) 98 - 107 mmol/L Final     Carbon Dioxide (CO2)   Date Value Ref Range Status   05/07/2024 22 22 - 29 mmol/L Final     Anion Gap   Date Value Ref Range Status   05/07/2024 10 7 - 15 " mmol/L Final     GLUCOSE BY METER POCT   Date Value Ref Range Status   05/09/2024 144 (H) 70 - 99 mg/dL Final     Urea Nitrogen   Date Value Ref Range Status   05/07/2024 8.6 6.0 - 20.0 mg/dL Final     Creatinine   Date Value Ref Range Status   05/09/2024 0.59 0.51 - 0.95 mg/dL Final     GFR Estimate   Date Value Ref Range Status   05/09/2024 >90 >60 mL/min/1.73m2 Final     Calcium   Date Value Ref Range Status   05/07/2024 9.0 8.6 - 10.0 mg/dL Final     Bilirubin Total   Date Value Ref Range Status   05/07/2024 0.3 <=1.2 mg/dL Final     Alkaline Phosphatase   Date Value Ref Range Status   05/07/2024 92 40 - 150 U/L Final     Comment:     Reference intervals for this test were updated on 11/14/2023 to more accurately reflect our healthy population. There may be differences in the flagging of prior results with similar values performed with this method. Interpretation of those prior results can be made in the context of the updated reference intervals.     ALT   Date Value Ref Range Status   05/07/2024 12 0 - 50 U/L Final     Comment:     Reference intervals for this test were updated on 6/12/2023 to more accurately reflect our healthy population. There may be differences in the flagging of prior results with similar values performed with this method. Interpretation of those prior results can be made in the context of the updated reference intervals.       AST   Date Value Ref Range Status   05/07/2024 22 0 - 45 U/L Final     Comment:     Reference intervals for this test were updated on 6/12/2023 to more accurately reflect our healthy population. There may be differences in the flagging of prior results with similar values performed with this method. Interpretation of those prior results can be made in the context of the updated reference intervals.                   Hgb A1C  Order: [id number]  Component  Ref Range & Units 1 mo ago   Hemoglobin A1C (Rapid)  <=5.6 % 8.5 High    Performing Location [facility]   Estimated  Average Glucose (Calc)  < 117 mg/dL 197   Comment: Estimated average glucose (eAG) converts A1c into glucose units (mg/dL) and estimates average glucose over the past approximately 3 months.  The eAG reference interval (<117 mg/dL) corresponds to an A1c of <5.7%.   Resulting Agency [facility]   Narrative  Performed by [facility]  For patients not previously diagnosed with diabetes:    DP and PT pulses 2/4. CRT 1 second. Diminished pedal hair.  Gross sensation is severely diminished.   Amp of the left hallux and partial 2nd digit. Bullae noted on the plantar left foot. THis was deroofed and te following wound was noted:        A wound is noted at left  plantar foot is healed and crusted. No s/s of acute infection.   __________________  Wound #2    A wound is noted at right  planar 3rd digit is healed and crusted. No s/s of infection.     Barriers to Healing: Both wounds are in areas of high pressure. High A1c. Previous delayed wound healing and amputations.     Treatment Plan: Mepilex on the left. Primapore on the right.     Pt Ability to Follow Plan: moderate.     Assessment: Glenny is a 57 YO with left plantar foot ulceration and right digital amputation. Wound are drying out and improving.     Plan:  - Pt seen and evaluated.  - Start dressings as above.   - Cont abx as directed by ID.  - Cont DH shoe.  - See again in 4 weeks.            Tae Chavira DPM

## 2024-08-02 ENCOUNTER — OFFICE VISIT (OUTPATIENT)
Dept: ORTHOPEDICS | Facility: CLINIC | Age: 59
End: 2024-08-02
Payer: COMMERCIAL

## 2024-08-02 ENCOUNTER — ANCILLARY PROCEDURE (OUTPATIENT)
Dept: GENERAL RADIOLOGY | Facility: CLINIC | Age: 59
End: 2024-08-02
Attending: ASSISTANT, PODIATRIC
Payer: COMMERCIAL

## 2024-08-02 DIAGNOSIS — L03.031 CELLULITIS OF RIGHT TOE: ICD-10-CM

## 2024-08-02 DIAGNOSIS — L03.031 CELLULITIS OF RIGHT TOE: Primary | ICD-10-CM

## 2024-08-02 DIAGNOSIS — E11.42 DIABETIC POLYNEUROPATHY ASSOCIATED WITH TYPE 2 DIABETES MELLITUS (H): ICD-10-CM

## 2024-08-02 DIAGNOSIS — L97.512 SKIN ULCER OF THIRD TOE OF RIGHT FOOT WITH FAT LAYER EXPOSED (H): ICD-10-CM

## 2024-08-02 PROCEDURE — 73630 X-RAY EXAM OF FOOT: CPT | Mod: RT | Performed by: RADIOLOGY

## 2024-08-02 PROCEDURE — 97597 DBRDMT OPN WND 1ST 20 CM/<: CPT | Performed by: ASSISTANT, PODIATRIC

## 2024-08-02 PROCEDURE — 99214 OFFICE O/P EST MOD 30 MIN: CPT | Mod: 25 | Performed by: ASSISTANT, PODIATRIC

## 2024-08-02 RX ORDER — DOXYCYCLINE 100 MG/1
100 CAPSULE ORAL 2 TIMES DAILY
Qty: 20 CAPSULE | Refills: 0 | Status: SHIPPED | OUTPATIENT
Start: 2024-08-02 | End: 2024-08-12

## 2024-08-02 NOTE — PROGRESS NOTES
Chief Complaint   Patient presents with    Wound Check     Bilateral foot.            HPI: Glenny is a 58 year old female diabetic who presents today for evaluation of right third toe wound which has reopened.  Notes it reopened about a week or so ago, she has been on her feet more with various conferences for work.  Notes some crusting and scabbing to the area but denies any overt drainage or pain to the area.  Notes that the toe has become a little bit red over the last few days.  She denies any fevers or chills or other feelings of illness in the last few days    Review of Systems: No n/v/d/f/c/ns/sob/cp    PMH:   Past Medical History:   Diagnosis Date    Diabetic foot ulcer (H)     Osteomyelitis (H)     Type 2 diabetes mellitus with diabetic polyneuropathy (H)        PSxH:   Past Surgical History:   Procedure Laterality Date    Amputation hallux Left     APPENDECTOMY  1992     SECTION      ,        Allergies: Piperacillin-tazobactam in dex, Cephalosporins, Statins, Cephalexin, Clindamycin, and Penicillins    SH:   Social History     Socioeconomic History    Marital status:      Spouse name: Not on file    Number of children: Not on file    Years of education: Not on file    Highest education level: Not on file   Occupational History    Not on file   Tobacco Use    Smoking status: Never    Smokeless tobacco: Never   Vaping Use    Vaping status: Never Used   Substance and Sexual Activity    Alcohol use: Not on file    Drug use: Not on file    Sexual activity: Not on file   Other Topics Concern    Not on file   Social History Narrative    Not on file     Social Determinants of Health     Financial Resource Strain: Not on file   Food Insecurity: Not on file   Transportation Needs: Not on file   Physical Activity: Not on file   Stress: Not on file   Social Connections: Not on file   Interpersonal Safety: Not on file   Housing Stability: Not on file       FH:   Family History   Problem  "Relation Age of Onset    Colon Cancer Mother     Glaucoma Mother     Diabetes Type 2  Mother     Dementia Father     Heart Failure Maternal Grandmother     Cerebrovascular Disease Maternal Grandfather        Objective:  97.4 P- 75 R- 22 BP - 123/64 97% O2    CRP Inflammation   Date Value Ref Range Status   05/09/2024 37.09 (H) <5.00 mg/L Final     Erythrocyte Sedimentation Rate   Date Value Ref Range Status   05/07/2024 21 0 - 30 mm/hr Final     Lab Results   Component Value Date    WBC 8.7 05/07/2024     Lab Results   Component Value Date    RBC 4.34 05/07/2024     Lab Results   Component Value Date    HGB 13.6 05/07/2024     Lab Results   Component Value Date    HCT 38.7 05/07/2024     No components found for: \"MCT\"  Lab Results   Component Value Date    MCV 89 05/07/2024     Lab Results   Component Value Date    MCH 31.3 05/07/2024     Lab Results   Component Value Date    MCHC 35.1 05/07/2024     Lab Results   Component Value Date    RDW 12.1 05/07/2024     Lab Results   Component Value Date     05/07/2024     Last Comprehensive Metabolic Panel:  Sodium   Date Value Ref Range Status   05/07/2024 141 135 - 145 mmol/L Final     Comment:     Reference intervals for this test were updated on 09/26/2023 to more accurately reflect our healthy population. There may be differences in the flagging of prior results with similar values performed with this method. Interpretation of those prior results can be made in the context of the updated reference intervals.      Potassium   Date Value Ref Range Status   05/07/2024 4.4 3.4 - 5.3 mmol/L Final     Chloride   Date Value Ref Range Status   05/07/2024 109 (H) 98 - 107 mmol/L Final     Carbon Dioxide (CO2)   Date Value Ref Range Status   05/07/2024 22 22 - 29 mmol/L Final     Anion Gap   Date Value Ref Range Status   05/07/2024 10 7 - 15 mmol/L Final     GLUCOSE BY METER POCT   Date Value Ref Range Status   05/09/2024 144 (H) 70 - 99 mg/dL Final     Urea Nitrogen "   Date Value Ref Range Status   05/07/2024 8.6 6.0 - 20.0 mg/dL Final     Creatinine   Date Value Ref Range Status   05/09/2024 0.59 0.51 - 0.95 mg/dL Final     GFR Estimate   Date Value Ref Range Status   05/09/2024 >90 >60 mL/min/1.73m2 Final     Calcium   Date Value Ref Range Status   05/07/2024 9.0 8.6 - 10.0 mg/dL Final     Bilirubin Total   Date Value Ref Range Status   05/07/2024 0.3 <=1.2 mg/dL Final     Alkaline Phosphatase   Date Value Ref Range Status   05/07/2024 92 40 - 150 U/L Final     Comment:     Reference intervals for this test were updated on 11/14/2023 to more accurately reflect our healthy population. There may be differences in the flagging of prior results with similar values performed with this method. Interpretation of those prior results can be made in the context of the updated reference intervals.     ALT   Date Value Ref Range Status   05/07/2024 12 0 - 50 U/L Final     Comment:     Reference intervals for this test were updated on 6/12/2023 to more accurately reflect our healthy population. There may be differences in the flagging of prior results with similar values performed with this method. Interpretation of those prior results can be made in the context of the updated reference intervals.       AST   Date Value Ref Range Status   05/07/2024 22 0 - 45 U/L Final     Comment:     Reference intervals for this test were updated on 6/12/2023 to more accurately reflect our healthy population. There may be differences in the flagging of prior results with similar values performed with this method. Interpretation of those prior results can be made in the context of the updated reference intervals.                   Hgb A1C  Order: [id number]  Component  Ref Range & Units 1 mo ago   Hemoglobin A1C (Rapid)  <=5.6 % 8.5 High    Performing Location [facility]   Estimated Average Glucose (Calc)  < 117 mg/dL 197   Comment: Estimated average glucose (eAG) converts A1c into glucose units (mg/dL) and  estimates average glucose over the past approximately 3 months.  The eAG reference interval (<117 mg/dL) corresponds to an A1c of <5.7%.   Resulting Agency [facility]   Narrative  Performed by [facility]  For patients not previously diagnosed with diabetes:    DP and PT pulses 2/4. CRT 1 second. Diminished pedal hair.  Gross sensation is severely diminished.   Amp of the left hallux and partial 2nd digit. Bullae noted on the plantar left foot. THis was deroofed and te following wound was noted:        A wound is noted at left  plantar foot is healed and crusted. No s/s of acute infection.   __________________  Wound #2    Right plantar third toe: The wound is with a granular base and surrounding hyperkeratotic tissue.  It does not probe to bone and there is no purulence from the wound.  There is no fluctuance at this time.  After debridement it bleeds easily.  No malodor.  But there is some local cellulitis extending through the toe.    Barriers to Healing: Both wounds are in areas of high pressure. High A1c. Previous delayed wound healing and amputations.     Treatment Plan: Right third toe wound please place Iodosorb and cover with gauze and secure changed every other day or daily if needed    Pt Ability to Follow Plan: moderate.         Assessment: Glenny is a 57 YO with history of ulceration to both feet and amputations.  She notes she has been walking around more with work and conferences and noticed that the right third toe wound has reopened.  She also does note some discoloration under the skin in her left foot but there is no open wound here at this time concerning for infection the skin in that area is malleable and not fluctuant.  Right third toe with some local cellulitis possible but no deep probing or pus at this time concerning for active abscess or an acute draining osteomyelitis.    Plan:  - Pt seen and evaluated.  - Start dressings as above.   -Prescribed 10-day  course of doxycycline for the local cellulitis and new wound, if there is new erosions she can continue doxycycline we will see her back for final plan.  Patient in the past has been recalcitrant to more advanced therapies and/or hospital admissions, patient does not prefer to take many medications.  If needed we will discuss something like an amputation in the future but patient at this time would like to avoid any sort of procedures like that.  If erosions are seen and progressing she may need to see infectious disease again for long-term antibiotic treatment.    No pus or other fluid to culture today  -Please continue custom shoes    Will see again in 2 weeks with Dr. Chavira

## 2024-08-02 NOTE — LETTER
2024      Glenny Campos  [address]  [place] [facility] [address]      Dear Colleague,    Thank you for referring your patient, Glenny Campos, to the [facility] [place]. Please see a copy of my visit note below.    Chief Complaint   Patient presents with     Wound Check     Bilateral foot.            HPI: Glenny is a 58 year old female diabetic who presents today for evaluation of right third toe wound which has reopened.  Notes it reopened about a week or so ago, she has been on her feet more with various conferences for work.  Notes some crusting and scabbing to the area but denies any overt drainage or pain to the area.  Notes that the toe has become a little bit red over the last few days.  She denies any fevers or chills or other feelings of illness in the last few days    Review of Systems: No n/v/d/f/c/ns/sob/cp    PMH:   Past Medical History:   Diagnosis Date     Diabetic foot ulcer (H)      Osteomyelitis (H)      Type 2 diabetes mellitus with diabetic polyneuropathy (H)        PSxH:   Past Surgical History:   Procedure Laterality Date     Amputation hallux Left      APPENDECTOMY  1992      SECTION      ,        Allergies: Piperacillin-tazobactam in dex, Cephalosporins, Statins, Cephalexin, Clindamycin, and Penicillins    SH:   Social History     Socioeconomic History     Marital status:      Spouse name: Not on file     Number of children: Not on file     Years of education: Not on file     Highest education level: Not on file   Occupational History     Not on file   Tobacco Use     Smoking status: Never     Smokeless tobacco: Never   Vaping Use     Vaping status: Never Used   Substance and Sexual Activity     Alcohol use: Not on file     Drug use: Not on file     Sexual activity: Not on file   Other Topics Concern     Not on file   Social History Narrative     Not on file     Social Determinants of Health     Financial Resource Strain: Not on  "file   Food Insecurity: Not on file   Transportation Needs: Not on file   Physical Activity: Not on file   Stress: Not on file   Social Connections: Not on file   Interpersonal Safety: Not on file   Housing Stability: Not on file       FH:   Family History   Problem Relation Age of Onset     Colon Cancer Mother      Glaucoma Mother      Diabetes Type 2  Mother      Dementia Father      Heart Failure Maternal Grandmother      Cerebrovascular Disease Maternal Grandfather        Objective:  97.4 P- 75 R- 22 BP - 123/64 97% O2    CRP Inflammation   Date Value Ref Range Status   05/09/2024 37.09 (H) <5.00 mg/L Final     Erythrocyte Sedimentation Rate   Date Value Ref Range Status   05/07/2024 21 0 - 30 mm/hr Final     Lab Results   Component Value Date    WBC 8.7 05/07/2024     Lab Results   Component Value Date    RBC 4.34 05/07/2024     Lab Results   Component Value Date    HGB 13.6 05/07/2024     Lab Results   Component Value Date    HCT 38.7 05/07/2024     No components found for: \"MCT\"  Lab Results   Component Value Date    MCV 89 05/07/2024     Lab Results   Component Value Date    MCH 31.3 05/07/2024     Lab Results   Component Value Date    MCHC 35.1 05/07/2024     Lab Results   Component Value Date    RDW 12.1 05/07/2024     Lab Results   Component Value Date     05/07/2024     Last Comprehensive Metabolic Panel:  Sodium   Date Value Ref Range Status   05/07/2024 141 135 - 145 mmol/L Final     Comment:     Reference intervals for this test were updated on 09/26/2023 to more accurately reflect our healthy population. There may be differences in the flagging of prior results with similar values performed with this method. Interpretation of those prior results can be made in the context of the updated reference intervals.      Potassium   Date Value Ref Range Status   05/07/2024 4.4 3.4 - 5.3 mmol/L Final     Chloride   Date Value Ref Range Status   05/07/2024 109 (H) 98 - 107 mmol/L Final     Carbon Dioxide " (CO2)   Date Value Ref Range Status   05/07/2024 22 22 - 29 mmol/L Final     Anion Gap   Date Value Ref Range Status   05/07/2024 10 7 - 15 mmol/L Final     GLUCOSE BY METER POCT   Date Value Ref Range Status   05/09/2024 144 (H) 70 - 99 mg/dL Final     Urea Nitrogen   Date Value Ref Range Status   05/07/2024 8.6 6.0 - 20.0 mg/dL Final     Creatinine   Date Value Ref Range Status   05/09/2024 0.59 0.51 - 0.95 mg/dL Final     GFR Estimate   Date Value Ref Range Status   05/09/2024 >90 >60 mL/min/1.73m2 Final     Calcium   Date Value Ref Range Status   05/07/2024 9.0 8.6 - 10.0 mg/dL Final     Bilirubin Total   Date Value Ref Range Status   05/07/2024 0.3 <=1.2 mg/dL Final     Alkaline Phosphatase   Date Value Ref Range Status   05/07/2024 92 40 - 150 U/L Final     Comment:     Reference intervals for this test were updated on 11/14/2023 to more accurately reflect our healthy population. There may be differences in the flagging of prior results with similar values performed with this method. Interpretation of those prior results can be made in the context of the updated reference intervals.     ALT   Date Value Ref Range Status   05/07/2024 12 0 - 50 U/L Final     Comment:     Reference intervals for this test were updated on 6/12/2023 to more accurately reflect our healthy population. There may be differences in the flagging of prior results with similar values performed with this method. Interpretation of those prior results can be made in the context of the updated reference intervals.       AST   Date Value Ref Range Status   05/07/2024 22 0 - 45 U/L Final     Comment:     Reference intervals for this test were updated on 6/12/2023 to more accurately reflect our healthy population. There may be differences in the flagging of prior results with similar values performed with this method. Interpretation of those prior results can be made in the context of the updated reference intervals.                   Hgb  A1C  Order: [id number]  Component  Ref Range & Units 1 mo ago   Hemoglobin A1C (Rapid)  <=5.6 % 8.5 High    Performing Location [facility]   Estimated Average Glucose (Calc)  < 117 mg/dL 197   Comment: Estimated average glucose (eAG) converts A1c into glucose units (mg/dL) and estimates average glucose over the past approximately 3 months.  The eAG reference interval (<117 mg/dL) corresponds to an A1c of <5.7%.   Resulting Agency [facility]   Narrative  Performed by [facility]  For patients not previously diagnosed with diabetes:    DP and PT pulses 2/4. CRT 1 second. Diminished pedal hair.  Gross sensation is severely diminished.   Amp of the left hallux and partial 2nd digit. Bullae noted on the plantar left foot. THis was deroofed and te following wound was noted:        A wound is noted at left  plantar foot is healed and crusted. No s/s of acute infection.   __________________  Wound #2    Right plantar third toe: The wound is with a granular base and surrounding hyperkeratotic tissue.  It does not probe to bone and there is no purulence from the wound.  There is no fluctuance at this time.  After debridement it bleeds easily.  No malodor.  But there is some local cellulitis extending through the toe.    Barriers to Healing: Both wounds are in areas of high pressure. High A1c. Previous delayed wound healing and amputations.     Treatment Plan: Right third toe wound please place Iodosorb and cover with gauze and secure changed every other day or daily if needed    Pt Ability to Follow Plan: moderate.         Assessment: Glenny is a 57 YO with history of ulceration to both feet and amputations.  She notes she has been walking around more with work and conferences and noticed that the right third toe wound has reopened.  She also does note some discoloration under the skin in her left foot but there is no open wound here at this time concerning for infection the skin in that area is  malleable and not fluctuant.  Right third toe with some local cellulitis possible but no deep probing or pus at this time concerning for active abscess or an acute draining osteomyelitis.    Plan:  - Pt seen and evaluated.  - Start dressings as above.   -Prescribed 10-day course of doxycycline for the local cellulitis and new wound, if there is new erosions she can continue doxycycline we will see her back for final plan.  Patient in the past has been recalcitrant to more advanced therapies and/or hospital admissions, patient does not prefer to take many medications.  If needed we will discuss something like an amputation in the future but patient at this time would like to avoid any sort of procedures like that.  If erosions are seen and progressing she may need to see infectious disease again for long-term antibiotic treatment.    No pus or other fluid to culture today  -Please continue custom shoes    Will see again in 2 weeks with Dr. Chavira             Again, thank you for allowing me to participate in the care of your patient.        Sincerely,        Hebert Calles DPM

## 2024-08-15 ENCOUNTER — OFFICE VISIT (OUTPATIENT)
Dept: WOUND CARE | Facility: CLINIC | Age: 59
End: 2024-08-15
Payer: COMMERCIAL

## 2024-08-15 DIAGNOSIS — L97.522 ULCER OF TOE, LEFT, WITH FAT LAYER EXPOSED (H): ICD-10-CM

## 2024-08-15 DIAGNOSIS — L97.512 ULCER OF TOE, RIGHT, WITH FAT LAYER EXPOSED (H): ICD-10-CM

## 2024-08-15 DIAGNOSIS — E11.49 TYPE II OR UNSPECIFIED TYPE DIABETES MELLITUS WITH NEUROLOGICAL MANIFESTATIONS, NOT STATED AS UNCONTROLLED(250.60) (H): Primary | ICD-10-CM

## 2024-08-15 PROCEDURE — 99213 OFFICE O/P EST LOW 20 MIN: CPT | Mod: 25 | Performed by: PODIATRIST

## 2024-08-15 PROCEDURE — 97597 DBRDMT OPN WND 1ST 20 CM/<: CPT | Performed by: PODIATRIST

## 2024-08-15 ASSESSMENT — PAIN SCALES - GENERAL: PAINLEVEL: MILD PAIN (3)

## 2024-08-15 NOTE — LETTER
8/15/2024       RE: Glenny Campos  [address] [place] [place] [facility] [address]     Dear Colleague,    Thank you for referring your patient, Glenny Campos, to the [facility] [place] [facility]. Please see a copy of my visit note below.    Chief Complaint   Patient presents with     RECHECK     Glenny, is being seen today for a follow up.           HPI: Glenny is a 58 year old female who presents today for further evaluation of right toe ulcer. She saw Dr. Calles last week. She notes that she has been on her feet more with conferences. The area has been open for about 2 weeks. She is using the Iodosorb to the area. She notes that she is wearing old shoes as it has been difficult for her to get her diabetic shoes. She has a new area that is rubbing on the left 5th toe.       Review of Systems: No n/v/d/f/c/ns/sob/cp    PMH:   Past Medical History:   Diagnosis Date     Diabetic foot ulcer (H)      Osteomyelitis (H)      Type 2 diabetes mellitus with diabetic polyneuropathy (H)        PSxH:   Past Surgical History:   Procedure Laterality Date     Amputation hallux Left      APPENDECTOMY  1992      SECTION      ,        Allergies: Piperacillin-tazobactam in dex, Cephalosporins, Statins, Cephalexin, Clindamycin, and Penicillins    SH:   Social History     Socioeconomic History     Marital status:      Spouse name: Not on file     Number of children: Not on file     Years of education: Not on file     Highest education level: Not on file   Occupational History     Not on file   Tobacco Use     Smoking status: Never     Smokeless tobacco: Never   Vaping Use     Vaping status: Never Used   Substance and Sexual Activity     Alcohol use: Not on file     Drug use: Not on file     Sexual activity: Not on file   Other Topics Concern     Not on file   Social History Narrative     Not on file     Social Determinants of Health      Financial Resource Strain: Not on file   Food Insecurity: Not on file   Transportation Needs: Not on file   Physical Activity: Not on file   Stress: Not on file   Social Connections: Not on file   Interpersonal Safety: Not on file   Housing Stability: Not on file       FH:   Family History   Problem Relation Age of Onset     Colon Cancer Mother      Glaucoma Mother      Diabetes Type 2  Mother      Dementia Father      Heart Failure Maternal Grandmother      Cerebrovascular Disease Maternal Grandfather        Objective:            Hgb A1C  Order: [id number]  Component  Ref Range & Units 1 mo ago   Hemoglobin A1C (Rapid)  <=5.6 % 8.5 High    Performing Location [facility]   Estimated Average Glucose (Calc)  < 117 mg/dL 197   Comment: Estimated average glucose (eAG) converts A1c into glucose units (mg/dL) and estimates average glucose over the past approximately 3 months.  The eAG reference interval (<117 mg/dL) corresponds to an A1c of <5.7%.   Resulting Agency [facility]   Narrative  Performed by [facility]  For patients not previously diagnosed with diabetes:    DP and PT pulses 2/4. CRT 1 second. Diminished pedal hair.  Gross sensation is severely diminished.   Amp of the left hallux and partial 2nd digit.   ______        A wound is noted at left  dorsal 5th  measuring 0.1cm x 0.1cm x 0.1cm.    Ross Classification: 2    Wound base: Yellow/Slough    Edges: hyperkeratotic    Drainage: slight/serous    Odor: no    Undermining: no    Bone Exposure: No    Clinical Signs of Infection: No    After obtaining patient consent, the wound was irrigated with copious amounts of saline. A scalpel was then used to debride the wound into dermal tissue. The wound edges were debrided back to healthy, bleeding tissue. The wound base exhibited healthy bleeding. Given the patient's lack of sensation, no anesthesia was necessary for the procedure.    Barriers to Healing: Wound rubbing on shoes. Previous  amputations.     Treatment Plan: Iodosorb and surgical shoe.         ____________  Wound #2        A wound is noted at right  plantar 3rd digit is noted as below:  A wound is noted at right  medial 3rd digit measuring 0.5cm x 0.3cm x 0.1cm.    Ross Classification: 2    Wound base: Red/Granulation    Edges: hyperkeratotic    Drainage: slight/serous    Odor: no    Undermining: no    Bone Exposure: No    Clinical Signs of Infection: No    After obtaining patient consent, the wound was irrigated with copious amounts of saline.     Barriers to Healing: Wound in an area of pressure. Previous amputations.     Treatment Plan: Iodosorb and Primapore.     Pt Ability to Follow Plan: Moderate.       Barriers to Healing: Both wounds are in areas of high pressure. High A1c. Previous delayed wound healing and amputations.     Treatment Plan: Mepilex on the left. Primapore on the right.     Pt Ability to Follow Plan: moderate.         Assessment: Glenny is a 59 YO with BL toe ulcerations and right digital amputation. She needs to wear surgical shoes, but gets thrown off, balance wise, with one sx shoe. I ordered 2 through the orthotics lab for her.     Plan:  - Pt seen and evaluated.  - Start dressings as above.   - Cont abx until completed.  - Cont DH shoe.  - See again in 2 weeks.              Again, thank you for allowing me to participate in the care of your patient.      Sincerely,    Tae Chavira DPM

## 2024-08-15 NOTE — PROGRESS NOTES
Chief Complaint   Patient presents with    MIGUEL Murrieta, is being seen today for a follow up.           HPI: Glenny is a 58 year old female who presents today for further evaluation of right toe ulcer. She saw Dr. Calles last week. She notes that she has been on her feet more with conferences. The area has been open for about 2 weeks. She is using the Iodosorb to the area. She notes that she is wearing old shoes as it has been difficult for her to get her diabetic shoes. She has a new area that is rubbing on the left 5th toe.       Review of Systems: No n/v/d/f/c/ns/sob/cp    PMH:   Past Medical History:   Diagnosis Date    Diabetic foot ulcer (H)     Osteomyelitis (H)     Type 2 diabetes mellitus with diabetic polyneuropathy (H)        PSxH:   Past Surgical History:   Procedure Laterality Date    Amputation hallux Left     APPENDECTOMY  1992     SECTION      ,        Allergies: Piperacillin-tazobactam in dex, Cephalosporins, Statins, Cephalexin, Clindamycin, and Penicillins    SH:   Social History     Socioeconomic History    Marital status:      Spouse name: Not on file    Number of children: Not on file    Years of education: Not on file    Highest education level: Not on file   Occupational History    Not on file   Tobacco Use    Smoking status: Never    Smokeless tobacco: Never   Vaping Use    Vaping status: Never Used   Substance and Sexual Activity    Alcohol use: Not on file    Drug use: Not on file    Sexual activity: Not on file   Other Topics Concern    Not on file   Social History Narrative    Not on file     Social Determinants of Health     Financial Resource Strain: Not on file   Food Insecurity: Not on file   Transportation Needs: Not on file   Physical Activity: Not on file   Stress: Not on file   Social Connections: Not on file   Interpersonal Safety: Not on file   Housing Stability: Not on file       FH:   Family History   Problem Relation Age of Onset    Colon  Cancer Mother     Glaucoma Mother     Diabetes Type 2  Mother     Dementia Father     Heart Failure Maternal Grandmother     Cerebrovascular Disease Maternal Grandfather        Objective:            Hgb A1C  Order: [id number]  Component  Ref Range & Units 1 mo ago   Hemoglobin A1C (Rapid)  <=5.6 % 8.5 High    Performing Location [facility]   Estimated Average Glucose (Calc)  < 117 mg/dL 197   Comment: Estimated average glucose (eAG) converts A1c into glucose units (mg/dL) and estimates average glucose over the past approximately 3 months.  The eAG reference interval (<117 mg/dL) corresponds to an A1c of <5.7%.   Resulting Agency [facility]   Narrative  Performed by [facility]  For patients not previously diagnosed with diabetes:    DP and PT pulses 2/4. CRT 1 second. Diminished pedal hair.  Gross sensation is severely diminished.   Amp of the left hallux and partial 2nd digit.   ______        A wound is noted at left  dorsal 5th  measuring 0.1cm x 0.1cm x 0.1cm.    Ross Classification: 2    Wound base: Yellow/Slough    Edges: hyperkeratotic    Drainage: slight/serous    Odor: no    Undermining: no    Bone Exposure: No    Clinical Signs of Infection: No    After obtaining patient consent, the wound was irrigated with copious amounts of saline. A scalpel was then used to debride the wound into dermal tissue. The wound edges were debrided back to healthy, bleeding tissue. The wound base exhibited healthy bleeding. Given the patient's lack of sensation, no anesthesia was necessary for the procedure.    Barriers to Healing: Wound rubbing on shoes. Previous amputations.     Treatment Plan: Iodosorb and surgical shoe.         ____________  Wound #2        A wound is noted at right  plantar 3rd digit is noted as below:  A wound is noted at right  medial 3rd digit measuring 0.5cm x 0.3cm x 0.1cm.    Ross Classification: 2    Wound base: Red/Granulation    Edges: hyperkeratotic    Drainage:  slight/serous    Odor: no    Undermining: no    Bone Exposure: No    Clinical Signs of Infection: No    After obtaining patient consent, the wound was irrigated with copious amounts of saline.     Barriers to Healing: Wound in an area of pressure. Previous amputations.     Treatment Plan: Iodosorb and Primapore.     Pt Ability to Follow Plan: Moderate.       Barriers to Healing: Both wounds are in areas of high pressure. High A1c. Previous delayed wound healing and amputations.     Treatment Plan: Mepilex on the left. Primapore on the right.     Pt Ability to Follow Plan: moderate.         Assessment: Glenny is a 59 YO with BL toe ulcerations and right digital amputation. She needs to wear surgical shoes, but gets thrown off, balance wise, with one sx shoe. I ordered 2 through the orthotics lab for her.     Plan:  - Pt seen and evaluated.  - Start dressings as above.   - Cont abx until completed.  - Cont DH shoe.  - See again in 2 weeks.

## 2024-08-15 NOTE — NURSING NOTE
Chief Complaint   Patient presents with    MIGUEL Murrieta, is being seen today for a follow up.       There were no vitals filed for this visit.    There is no height or weight on file to calculate BMI.    Per provider    Tina Alvarado LPN

## 2024-08-29 ENCOUNTER — OFFICE VISIT (OUTPATIENT)
Dept: WOUND CARE | Facility: CLINIC | Age: 59
End: 2024-08-29
Payer: COMMERCIAL

## 2024-08-29 DIAGNOSIS — L97.512 ULCER OF TOE, RIGHT, WITH FAT LAYER EXPOSED (H): ICD-10-CM

## 2024-08-29 DIAGNOSIS — L97.522 ULCER OF TOE, LEFT, WITH FAT LAYER EXPOSED (H): ICD-10-CM

## 2024-08-29 DIAGNOSIS — E11.49 TYPE II OR UNSPECIFIED TYPE DIABETES MELLITUS WITH NEUROLOGICAL MANIFESTATIONS, NOT STATED AS UNCONTROLLED(250.60) (H): Primary | ICD-10-CM

## 2024-08-29 PROCEDURE — 97597 DBRDMT OPN WND 1ST 20 CM/<: CPT | Performed by: PODIATRIST

## 2024-08-29 RX ORDER — PROCHLORPERAZINE 25 MG/1
SUPPOSITORY RECTAL
COMMUNITY
Start: 2024-08-22

## 2024-08-29 RX ORDER — INSULIN PMP CART,AUT,G6/7,CNTR
EACH SUBCUTANEOUS
COMMUNITY
Start: 2024-06-11

## 2024-08-29 ASSESSMENT — PAIN SCALES - GENERAL: PAINLEVEL: MODERATE PAIN (5)

## 2024-08-29 NOTE — LETTER
2024       RE: Glenny Campos  [address] [facility] [address]     Dear Colleague,    Thank you for referring your patient, Glenny Campos, to the [facility] [place] at [facility]. Please see a copy of my visit note below.    Chief Complaint   Patient presents with     RECHECK     Wound check: R 3rd toe, L 5th toe.           HPI: Glenny is a 58 year old female who presents today for further evaluation of right toe ulcer.  She got diabetic shoes a few weeks ago and has rubbing on the right 5th toe, which has opened a new ulcer. She wears the DH shoe on the left.       Review of Systems: No n/v/d/f/c/ns/sob/cp    PMH:   Past Medical History:   Diagnosis Date     Diabetic foot ulcer (H)      Osteomyelitis (H)      Type 2 diabetes mellitus with diabetic polyneuropathy (H)        PSxH:   Past Surgical History:   Procedure Laterality Date     Amputation hallux Left      APPENDECTOMY  1992      SECTION      ,        Allergies: Piperacillin-tazobactam in dex, Cephalosporins, Statins, Cephalexin, Clindamycin, and Penicillins    SH:   Social History     Socioeconomic History     Marital status:      Spouse name: Not on file     Number of children: Not on file     Years of education: Not on file     Highest education level: Not on file   Occupational History     Not on file   Tobacco Use     Smoking status: Never     Smokeless tobacco: Never   Vaping Use     Vaping status: Never Used   Substance and Sexual Activity     Alcohol use: Not on file     Drug use: Not on file     Sexual activity: Not on file   Other Topics Concern     Not on file   Social History Narrative     Not on file     Social Determinants of Health     Financial Resource Strain: Not on file   Food Insecurity: Not on file   Transportation Needs: Not on file   Physical Activity: Not on file   Stress: Not on file   Social Connections: Not on file    Interpersonal Safety: Not on file   Housing Stability: Not on file       FH:   Family History   Problem Relation Age of Onset     Colon Cancer Mother      Glaucoma Mother      Diabetes Type 2  Mother      Dementia Father      Heart Failure Maternal Grandmother      Cerebrovascular Disease Maternal Grandfather        Objective:            Hgb A1C  Order: [id number]  Component  Ref Range & Units 1 mo ago   Hemoglobin A1C (Rapid)  <=5.6 % 8.5 High    Performing Location [facility]   Estimated Average Glucose (Calc)  < 117 mg/dL 197   Comment: Estimated average glucose (eAG) converts A1c into glucose units (mg/dL) and estimates average glucose over the past approximately 3 months.  The eAG reference interval (<117 mg/dL) corresponds to an A1c of <5.7%.   Resulting Agency [facility]   Narrative  Performed by [facility] [employer]  For patients not previously diagnosed with diabetes:    DP and PT pulses 2/4. CRT 1 second. Diminished pedal hair.  Gross sensation is severely diminished.   Amp of the left hallux and partial 2nd digit.   ______        A wound is noted at right dorsal 5th  measuring 0.1cm x 0.1cm x 0.1cm.    Ross Classification: 2    Wound base: Red granulation    Edges: hyperkeratotic    Drainage: slight/serous    Odor: no    Undermining: no    Bone Exposure: No    Clinical Signs of Infection: No    After obtaining patient consent, the wound was irrigated with copious amounts of saline. A scalpel was then used to debride the wound into dermal tissue. The wound edges were debrided back to healthy, bleeding tissue. The wound base exhibited healthy bleeding. Given the patient's lack of sensation, no anesthesia was necessary for the procedure.          ____________  Wound #2        A wound is noted at right  medial 3rd digit measuring 1cm x 0.5cm x 0.1cm.    Ross Classification: 2    Wound base: Red/Granulation    Edges: hyperkeratotic    Drainage: slight/serous    Odor: no    Undermining:  no    Bone Exposure: No    Clinical Signs of Infection: No    After obtaining patient consent, the wound was irrigated with copious amounts of saline.     Barriers to Healing: Wound in an area of pressure. Previous amputations.     Treatment Plan: Procelera and Primapore.     Pt Ability to Follow Plan: Moderate.             Assessment: Glenny is a 59 YO with BL toe ulcerations and right digital amputation.She needs the     Plan:  - Pt seen and evaluated.  - Start dressings as above.   - Cont abx until completed.  - Cont DH shoe.  - See again in 2 weeks.              Again, thank you for allowing me to participate in the care of your patient.      Sincerely,    Tae Chavira DPM

## 2024-08-29 NOTE — NURSING NOTE
Chief Complaint   Patient presents with    RECHECK     Wound check: R 3rd toe, L 5th toe.       There were no vitals filed for this visit.    There is no height or weight on file to calculate BMI. Per provider.    Moderate pain, 5/10.    Nick Álvarez, EMT

## 2024-08-29 NOTE — PROGRESS NOTES
Chief Complaint   Patient presents with    RECHECK     Wound check: R 3rd toe, L 5th toe.           HPI: Glenny is a 58 year old female who presents today for further evaluation of right toe ulcer.  She got diabetic shoes a few weeks ago and has rubbing on the right 5th toe, which has opened a new ulcer. She wears the DH shoe on the left.       Review of Systems: No n/v/d/f/c/ns/sob/cp    PMH:   Past Medical History:   Diagnosis Date    Diabetic foot ulcer (H)     Osteomyelitis (H)     Type 2 diabetes mellitus with diabetic polyneuropathy (H)        PSxH:   Past Surgical History:   Procedure Laterality Date    Amputation hallux Left     APPENDECTOMY  1992     SECTION      ,        Allergies: Piperacillin-tazobactam in dex, Cephalosporins, Statins, Cephalexin, Clindamycin, and Penicillins    SH:   Social History     Socioeconomic History    Marital status:      Spouse name: Not on file    Number of children: Not on file    Years of education: Not on file    Highest education level: Not on file   Occupational History    Not on file   Tobacco Use    Smoking status: Never    Smokeless tobacco: Never   Vaping Use    Vaping status: Never Used   Substance and Sexual Activity    Alcohol use: Not on file    Drug use: Not on file    Sexual activity: Not on file   Other Topics Concern    Not on file   Social History Narrative    Not on file     Social Determinants of Health     Financial Resource Strain: Not on file   Food Insecurity: Not on file   Transportation Needs: Not on file   Physical Activity: Not on file   Stress: Not on file   Social Connections: Not on file   Interpersonal Safety: Not on file   Housing Stability: Not on file       FH:   Family History   Problem Relation Age of Onset    Colon Cancer Mother     Glaucoma Mother     Diabetes Type 2  Mother     Dementia Father     Heart Failure Maternal Grandmother     Cerebrovascular Disease Maternal Grandfather        Objective:            Hgb  A1C  Order: [id number]  Component  Ref Range & Units 1 mo ago   Hemoglobin A1C (Rapid)  <=5.6 % 8.5 High    Performing Location [facility]   Estimated Average Glucose (Calc)  < 117 mg/dL 197   Comment: Estimated average glucose (eAG) converts A1c into glucose units (mg/dL) and estimates average glucose over the past approximately 3 months.  The eAG reference interval (<117 mg/dL) corresponds to an A1c of <5.7%.   Resulting Agency [facility]   Narrative  Performed by [facility]  For patients not previously diagnosed with diabetes:    DP and PT pulses 2/4. CRT 1 second. Diminished pedal hair.  Gross sensation is severely diminished.   Amp of the left hallux and partial 2nd digit.   ______        A wound is noted at right dorsal 5th  measuring 0.1cm x 0.1cm x 0.1cm.    Ross Classification: 2    Wound base: Red granulation    Edges: hyperkeratotic    Drainage: slight/serous    Odor: no    Undermining: no    Bone Exposure: No    Clinical Signs of Infection: No    After obtaining patient consent, the wound was irrigated with copious amounts of saline. A scalpel was then used to debride the wound into dermal tissue. The wound edges were debrided back to healthy, bleeding tissue. The wound base exhibited healthy bleeding. Given the patient's lack of sensation, no anesthesia was necessary for the procedure.          ____________  Wound #2        A wound is noted at right  medial 3rd digit measuring 1cm x 0.5cm x 0.1cm.    Ross Classification: 2    Wound base: Red/Granulation    Edges: hyperkeratotic    Drainage: slight/serous    Odor: no    Undermining: no    Bone Exposure: No    Clinical Signs of Infection: No    After obtaining patient consent, the wound was irrigated with copious amounts of saline.     Barriers to Healing: Wound in an area of pressure. Previous amputations.     Treatment Plan: Procelera and Primapore.     Pt Ability to Follow Plan: Moderate.             Assessment: Glenny nunes  a 57 YO with right toe ulcerations and right 2nd toe  digital amputation.    Plan:  - Pt seen and evaluated.  - Start dressings as above.   - Cont abx until completed.  - Cont DH shoe.  - See again in 2 weeks.

## 2024-09-26 ENCOUNTER — OFFICE VISIT (OUTPATIENT)
Dept: WOUND CARE | Facility: CLINIC | Age: 59
End: 2024-09-26
Payer: COMMERCIAL

## 2024-09-26 DIAGNOSIS — L97.512 ULCER OF TOE, RIGHT, WITH FAT LAYER EXPOSED (H): ICD-10-CM

## 2024-09-26 DIAGNOSIS — E11.49 TYPE II OR UNSPECIFIED TYPE DIABETES MELLITUS WITH NEUROLOGICAL MANIFESTATIONS, NOT STATED AS UNCONTROLLED(250.60) (H): Primary | ICD-10-CM

## 2024-09-26 PROCEDURE — 11042 DBRDMT SUBQ TIS 1ST 20SQCM/<: CPT | Performed by: PODIATRIST

## 2024-09-26 ASSESSMENT — PAIN SCALES - GENERAL: PAINLEVEL: MILD PAIN (3)

## 2024-09-26 NOTE — PROGRESS NOTES
Chief Complaint   Patient presents with    RECHECK     4 week follow-up.           HPI: Glenny is a 58 year old female who presents today for further evaluation of right toe ulcer.  She got diabetic shoes a few weeks ago and has rubbing on the right 5th toe, which has opened a new ulcer. She is in diabetic shoes. She self avulsed the right 2nd toenail.      Review of Systems: No n/v/d/f/c/ns/sob/cp    PMH:   Past Medical History:   Diagnosis Date    Diabetic foot ulcer (H)     Osteomyelitis (H)     Type 2 diabetes mellitus with diabetic polyneuropathy (H)        PSxH:   Past Surgical History:   Procedure Laterality Date    Amputation hallux Left     APPENDECTOMY  1992     SECTION      ,        Allergies: Piperacillin-tazobactam in dex, Cephalosporins, Statins, Cephalexin, Clindamycin, and Penicillins    SH:   Social History     Socioeconomic History    Marital status:      Spouse name: Not on file    Number of children: Not on file    Years of education: Not on file    Highest education level: Not on file   Occupational History    Not on file   Tobacco Use    Smoking status: Never    Smokeless tobacco: Never   Vaping Use    Vaping status: Never Used   Substance and Sexual Activity    Alcohol use: Not on file    Drug use: Not on file    Sexual activity: Not on file   Other Topics Concern    Not on file   Social History Narrative    Not on file     Social Determinants of Health     Financial Resource Strain: Not on file   Food Insecurity: Not on file   Transportation Needs: Not on file   Physical Activity: Not on file   Stress: Not on file   Social Connections: Not on file   Interpersonal Safety: Not on file   Housing Stability: Not on file       FH:   Family History   Problem Relation Age of Onset    Colon Cancer Mother     Glaucoma Mother     Diabetes Type 2  Mother     Dementia Father     Heart Failure Maternal Grandmother     Cerebrovascular Disease Maternal Grandfather         Objective:            Hgb A1C  Order: [id number]  Component  Ref Range & Units 1 mo ago   Hemoglobin A1C (Rapid)  <=5.6 % 8.5 High    Performing Location [facility]   Estimated Average Glucose (Calc)  < 117 mg/dL 197   Comment: Estimated average glucose (eAG) converts A1c into glucose units (mg/dL) and estimates average glucose over the past approximately 3 months.  The eAG reference interval (<117 mg/dL) corresponds to an A1c of <5.7%.   Resulting Agency [facility]   Narrative  Performed by [facility]  For patients not previously diagnosed with diabetes:    DP and PT pulses 2/4. CRT 1 second. Diminished pedal hair.  Gross sensation is severely diminished.   Amp of the left hallux and partial 2nd digit.   ______      A wound is noted at right dorsal 5th  measuring 0.1cm x 0.1cm x 0.1cm.    Ross Classification: 2    Wound base: Red granulation    Edges: hyperkeratotic    Drainage: slight/serous    Odor: no    Undermining: no    Bone Exposure: No    Clinical Signs of Infection: No    After obtaining patient consent, the wound was irrigated with copious amounts of saline. A scalpel was then used to debride the wound into dermal tissue. The wound edges were debrided back to healthy, bleeding tissue. The wound base exhibited healthy bleeding. Given the patient's lack of sensation, no anesthesia was necessary for the procedure.          ____________  Wound #2        A wound is noted at right  medial 3rd digit measuring 1cm x 0.5cm x 0.1cm.    Ross Classification: 2    Wound base: Red/Granulation    Edges: hyperkeratotic    Drainage: slight/serous    Odor: no    Undermining: no    Bone Exposure: No    Clinical Signs of Infection: No      After obtaining patient consent, the wound was irrigated with copious amounts of saline. A tissue nipper was then used to debride the wound into subcutaneous tissue. The wound edges were debrided back to healthy, bleeding tissue. The wound base exhibited  healthy bleeding. Given the patient's lack of sensation, no anesthesia was necessary for the procedure.        Barriers to Healing: Wound in an area of pressure. Previous amputations.     Treatment Plan: Procelera and Primapore.     Pt Ability to Follow Plan: Moderate.             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    Plan:  - Pt seen and evaluated.  - Start dressings as above.   - Wound debrided as described.   - See again in 2 weeks.

## 2024-09-26 NOTE — NURSING NOTE
Chief Complaint   Patient presents with    RECHECK     4 week follow-up.       There were no vitals filed for this visit.    There is no height or weight on file to calculate BMI. Per provider.    Patient reports minimal bilateral feet pain (3/10).    Nick Álvarez, EMT

## 2024-09-26 NOTE — LETTER
2024       RE: Glenny Campos  [address] [facility] [address]     Dear Colleague,    Thank you for referring your patient, Glenny Campos, to the [facility] WOUND [facility] [place] [facility]. Please see a copy of my visit note below.    Chief Complaint   Patient presents with     RECHECK     4 week follow-up.           HPI: Glenny is a 58 year old female who presents today for further evaluation of right toe ulcer.  She got diabetic shoes a few weeks ago and has rubbing on the right 5th toe, which has opened a new ulcer. She is in diabetic shoes. She self avulsed the right 2nd toenail.      Review of Systems: No n/v/d/f/c/ns/sob/cp    PMH:   Past Medical History:   Diagnosis Date     Diabetic foot ulcer (H)      Osteomyelitis (H)      Type 2 diabetes mellitus with diabetic polyneuropathy (H)        PSxH:   Past Surgical History:   Procedure Laterality Date     Amputation hallux Left      APPENDECTOMY  1992      SECTION      ,        Allergies: Piperacillin-tazobactam in dex, Cephalosporins, Statins, Cephalexin, Clindamycin, and Penicillins    SH:   Social History     Socioeconomic History     Marital status:      Spouse name: Not on file     Number of children: Not on file     Years of education: Not on file     Highest education level: Not on file   Occupational History     Not on file   Tobacco Use     Smoking status: Never     Smokeless tobacco: Never   Vaping Use     Vaping status: Never Used   Substance and Sexual Activity     Alcohol use: Not on file     Drug use: Not on file     Sexual activity: Not on file   Other Topics Concern     Not on file   Social History Narrative     Not on file     Social Determinants of Health     Financial Resource Strain: Not on file   Food Insecurity: Not on file   Transportation Needs: Not on file   Physical Activity: Not on file   Stress: Not on file   Social Connections: Not on  file   Interpersonal Safety: Not on file   Housing Stability: Not on file       FH:   Family History   Problem Relation Age of Onset     Colon Cancer Mother      Glaucoma Mother      Diabetes Type 2  Mother      Dementia Father      Heart Failure Maternal Grandmother      Cerebrovascular Disease Maternal Grandfather        Objective:            Hgb A1C  Order: [id number]  Component  Ref Range & Units 1 mo ago   Hemoglobin A1C (Rapid)  <=5.6 % 8.5 High    Performing Location [facility]   Estimated Average Glucose (Calc)  < 117 mg/dL 197   Comment: Estimated average glucose (eAG) converts A1c into glucose units (mg/dL) and estimates average glucose over the past approximately 3 months.  The eAG reference interval (<117 mg/dL) corresponds to an A1c of <5.7%.   Resulting Agency [facility]   Narrative  Performed by [facility]  For patients not previously diagnosed with diabetes:    DP and PT pulses 2/4. CRT 1 second. Diminished pedal hair.  Gross sensation is severely diminished.   Amp of the left hallux and partial 2nd digit.   ______      A wound is noted at right dorsal 5th  measuring 0.1cm x 0.1cm x 0.1cm.    Ross Classification: 2    Wound base: Red granulation    Edges: hyperkeratotic    Drainage: slight/serous    Odor: no    Undermining: no    Bone Exposure: No    Clinical Signs of Infection: No    After obtaining patient consent, the wound was irrigated with copious amounts of saline. A scalpel was then used to debride the wound into dermal tissue. The wound edges were debrided back to healthy, bleeding tissue. The wound base exhibited healthy bleeding. Given the patient's lack of sensation, no anesthesia was necessary for the procedure.          ____________  Wound #2        A wound is noted at right  medial 3rd digit measuring 1cm x 0.5cm x 0.1cm.    Ross Classification: 2    Wound base: Red/Granulation    Edges: hyperkeratotic    Drainage: slight/serous    Odor: no    Undermining:  no    Bone Exposure: No    Clinical Signs of Infection: No      After obtaining patient consent, the wound was irrigated with copious amounts of saline. A tissue nipper was then used to debride the wound into subcutaneous tissue. The wound edges were debrided back to healthy, bleeding tissue. The wound base exhibited healthy bleeding. Given the patient's lack of sensation, no anesthesia was necessary for the procedure.        Barriers to Healing: Wound in an area of pressure. Previous amputations.     Treatment Plan: Procelera and Primapore.     Pt Ability to Follow Plan: Moderate.             Assessment: Glenny is a 57 YO with right toe ulcerations and right 2nd toe  digital amputation.    Plan:  - Pt seen and evaluated.  - Start dressings as above.   - Wound debrided as described.   - See again in 2 weeks.              Again, thank you for allowing me to participate in the care of your patient.      Sincerely,    Tae Chavira DPM

## 2024-10-06 ENCOUNTER — HEALTH MAINTENANCE LETTER (OUTPATIENT)
Age: 59
End: 2024-10-06

## 2024-10-24 ENCOUNTER — OFFICE VISIT (OUTPATIENT)
Dept: WOUND CARE | Facility: CLINIC | Age: 59
End: 2024-10-24
Payer: COMMERCIAL

## 2024-10-24 DIAGNOSIS — L97.512 ULCER OF TOE, RIGHT, WITH FAT LAYER EXPOSED (H): ICD-10-CM

## 2024-10-24 DIAGNOSIS — E11.49 TYPE II OR UNSPECIFIED TYPE DIABETES MELLITUS WITH NEUROLOGICAL MANIFESTATIONS, NOT STATED AS UNCONTROLLED(250.60) (H): Primary | ICD-10-CM

## 2024-10-24 PROCEDURE — 97597 DBRDMT OPN WND 1ST 20 CM/<: CPT | Performed by: PODIATRIST

## 2024-10-24 PROCEDURE — 99213 OFFICE O/P EST LOW 20 MIN: CPT | Mod: 25 | Performed by: PODIATRIST

## 2024-10-24 ASSESSMENT — PAIN SCALES - GENERAL: PAINLEVEL_OUTOF10: MODERATE PAIN (5)

## 2024-10-24 NOTE — PROGRESS NOTES
Chief Complaint   Patient presents with    YEIMYECK     Glenny, is being seen today for a right 3rd toe, le 4th & 5th toe.           HPI: Glenny is a 58 year old female who presents today for further evaluation of right toe ulcer.  She got diabetic shoes a few weeks ago and has rubbing on the right 5th toe, which has opened a new ulcer. She is in diabetic shoes. She self avulsed the right 2nd toenail.      Interval hx: She is wearing her offloading shoes. Changing the dressing as directed.     Review of Systems: No n/v/d/f/c/ns/sob/cp    PMH:   Past Medical History:   Diagnosis Date    Diabetic foot ulcer (H)     Osteomyelitis (H)     Type 2 diabetes mellitus with diabetic polyneuropathy (H)        PSxH:   Past Surgical History:   Procedure Laterality Date    Amputation hallux Left     APPENDECTOMY  1992     SECTION      ,        Allergies: Piperacillin-tazobactam in dex, Cephalosporins, Statins, Cephalexin, Clindamycin, and Penicillins    SH:   Social History     Socioeconomic History    Marital status:      Spouse name: Not on file    Number of children: Not on file    Years of education: Not on file    Highest education level: Not on file   Occupational History    Not on file   Tobacco Use    Smoking status: Never    Smokeless tobacco: Never   Vaping Use    Vaping status: Never Used   Substance and Sexual Activity    Alcohol use: Not on file    Drug use: Not on file    Sexual activity: Not on file   Other Topics Concern    Not on file   Social History Narrative    Not on file     Social Drivers of Health     Financial Resource Strain: Not on file   Food Insecurity: Not on file   Transportation Needs: Not on file   Physical Activity: Not on file   Stress: Not on file   Social Connections: Not on file   Interpersonal Safety: Not on file   Housing Stability: Not on file       FH:   Family History   Problem Relation Age of Onset    Colon Cancer Mother     Glaucoma Mother     Diabetes Type 2   Mother     Dementia Father     Heart Failure Maternal Grandmother     Cerebrovascular Disease Maternal Grandfather        Objective:            Hgb A1C  Order: [id number]  Component  Ref Range & Units 1 mo ago   Hemoglobin A1C (Rapid)  <=5.6 % 8.5 High    Performing Location [facility]   Estimated Average Glucose (Calc)  < 117 mg/dL 197   Comment: Estimated average glucose (eAG) converts A1c into glucose units (mg/dL) and estimates average glucose over the past approximately 3 months.  The eAG reference interval (<117 mg/dL) corresponds to an A1c of <5.7%.   Resulting Agency [facility]   Narrative  Performed by [facility]  For patients not previously diagnosed with diabetes:    DP and PT pulses 2/4. CRT 1 second. Diminished pedal hair.  Gross sensation is severely diminished.   Amp of the left hallux and partial 2nd digit.   ______      A wound is noted at right dorsal 5th  measuring 0.1cm x 0.1cm x 0.1cm.    Ross Classification: 2    Wound base: Red granulation    Edges: hyperkeratotic    Drainage: slight/serous    Odor: no    Undermining: no    Bone Exposure: No    Clinical Signs of Infection: No    After obtaining patient consent, the wound was irrigated with copious amounts of saline. A scalpel was then used to debride the wound into dermal tissue. The wound edges were debrided back to healthy, bleeding tissue. The wound base exhibited healthy bleeding. Given the patient's lack of sensation, no anesthesia was necessary for the procedure.          ____________  Wound #2        A wound is noted at right  medial 3rd digit measuring 2cm x 0.8cm x 0.1cm.    Ross Classification: 2    Wound base: Red/Granulation    Edges: hyperkeratotic    Drainage: slight/serous    Odor: no    Undermining: no    Bone Exposure: No    Clinical Signs of Infection: No      After obtaining patient consent, the wound was irrigated with copious amounts of saline. A tissue nipper was then used to debride the wound  into dermal tissue. The wound edges were debrided back to healthy, bleeding tissue. The wound base exhibited healthy bleeding. Given the patient's lack of sensation, no anesthesia was necessary for the procedure.        Barriers to Healing: Wound in an area of pressure. Previous amputations.     Treatment Plan: Procelera and Primapore.     Pt Ability to Follow Plan: Moderate.             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    Plan:  - Pt seen and evaluated.  - Start dressings as above.   - Wound debrided as described.   - See again in 3 weeks.

## 2024-10-24 NOTE — LETTER
10/24/2024       RE: Glenny Campos  [address]  [place] [facility] [address]     Dear Colleague,    Thank you for referring your patient, Glenny Campos, to the [facility] [place] at [facility]. Please see a copy of my visit note below.    Chief Complaint   Patient presents with     RECHECK     Glenny, is being seen today for a right 3rd toe, le 4th & 5th toe.           HPI: Glenny is a 58 year old female who presents today for further evaluation of right toe ulcer.  She got diabetic shoes a few weeks ago and has rubbing on the right 5th toe, which has opened a new ulcer. She is in diabetic shoes. She self avulsed the right 2nd toenail.      Interval hx: She is wearing her offloading shoes. Changing the dressing as directed.     Review of Systems: No n/v/d/f/c/ns/sob/cp    PMH:   Past Medical History:   Diagnosis Date     Diabetic foot ulcer (H)      Osteomyelitis (H)      Type 2 diabetes mellitus with diabetic polyneuropathy (H)        PSxH:   Past Surgical History:   Procedure Laterality Date     Amputation hallux Left      APPENDECTOMY  1992      SECTION      ,        Allergies: Piperacillin-tazobactam in dex, Cephalosporins, Statins, Cephalexin, Clindamycin, and Penicillins    SH:   Social History     Socioeconomic History     Marital status:      Spouse name: Not on file     Number of children: Not on file     Years of education: Not on file     Highest education level: Not on file   Occupational History     Not on file   Tobacco Use     Smoking status: Never     Smokeless tobacco: Never   Vaping Use     Vaping status: Never Used   Substance and Sexual Activity     Alcohol use: Not on file     Drug use: Not on file     Sexual activity: Not on file   Other Topics Concern     Not on file   Social History Narrative     Not on file     Social Drivers of Health     Financial Resource Strain: Not on file   Food  Insecurity: Not on file   Transportation Needs: Not on file   Physical Activity: Not on file   Stress: Not on file   Social Connections: Not on file   Interpersonal Safety: Not on file   Housing Stability: Not on file       FH:   Family History   Problem Relation Age of Onset     Colon Cancer Mother      Glaucoma Mother      Diabetes Type 2  Mother      Dementia Father      Heart Failure Maternal Grandmother      Cerebrovascular Disease Maternal Grandfather        Objective:            Hgb A1C  Order: [id number]  Component  Ref Range & Units 1 mo ago   Hemoglobin A1C (Rapid)  <=5.6 % 8.5 High    Performing Location [facility]   Estimated Average Glucose (Calc)  < 117 mg/dL 197   Comment: Estimated average glucose (eAG) converts A1c into glucose units (mg/dL) and estimates average glucose over the past approximately 3 months.  The eAG reference interval (<117 mg/dL) corresponds to an A1c of <5.7%.   Resulting Agency [facility]   Narrative  Performed by [facility]  For patients not previously diagnosed with diabetes:    DP and PT pulses 2/4. CRT 1 second. Diminished pedal hair.  Gross sensation is severely diminished.   Amp of the left hallux and partial 2nd digit.   ______      A wound is noted at right dorsal 5th  measuring 0.1cm x 0.1cm x 0.1cm.    Ross Classification: 2    Wound base: Red granulation    Edges: hyperkeratotic    Drainage: slight/serous    Odor: no    Undermining: no    Bone Exposure: No    Clinical Signs of Infection: No    After obtaining patient consent, the wound was irrigated with copious amounts of saline. A scalpel was then used to debride the wound into dermal tissue. The wound edges were debrided back to healthy, bleeding tissue. The wound base exhibited healthy bleeding. Given the patient's lack of sensation, no anesthesia was necessary for the procedure.          ____________  Wound #2        A wound is noted at right  medial 3rd digit measuring 2cm x 0.8cm x  0.1cm.    Ross Classification: 2    Wound base: Red/Granulation    Edges: hyperkeratotic    Drainage: slight/serous    Odor: no    Undermining: no    Bone Exposure: No    Clinical Signs of Infection: No      After obtaining patient consent, the wound was irrigated with copious amounts of saline. A tissue nipper was then used to debride the wound into dermal tissue. The wound edges were debrided back to healthy, bleeding tissue. The wound base exhibited healthy bleeding. Given the patient's lack of sensation, no anesthesia was necessary for the procedure.        Barriers to Healing: Wound in an area of pressure. Previous amputations.     Treatment Plan: Procelera and Primapore.     Pt Ability to Follow Plan: Moderate.             Assessment: Glenny is a 57 YO with right toe ulcerations and right 2nd toe  digital amputation.    Plan:  - Pt seen and evaluated.  - Start dressings as above.   - Wound debrided as described.   - See again in 3 weeks.              Again, thank you for allowing me to participate in the care of your patient.      Sincerely,    Tae Chavira DPM

## 2024-10-24 NOTE — NURSING NOTE
Chief Complaint   Patient presents with    MIGUEL [place]ri[place], is being seen today for a right 3rd toe, le 4th & 5th toe.       There were no vitals filed for this visit.    There is no height or weight on file to calculate BMI.    Per provider    Tina Alvarado LPN

## 2024-12-19 ENCOUNTER — OFFICE VISIT (OUTPATIENT)
Dept: WOUND CARE | Facility: CLINIC | Age: 59
End: 2024-12-19
Payer: COMMERCIAL

## 2024-12-19 DIAGNOSIS — E11.49 TYPE II OR UNSPECIFIED TYPE DIABETES MELLITUS WITH NEUROLOGICAL MANIFESTATIONS, NOT STATED AS UNCONTROLLED(250.60) (H): Primary | ICD-10-CM

## 2024-12-19 DIAGNOSIS — L97.512 ULCER OF TOE, RIGHT, WITH FAT LAYER EXPOSED (H): ICD-10-CM

## 2024-12-19 ASSESSMENT — PAIN SCALES - GENERAL: PAINLEVEL_OUTOF10: NO PAIN (0)

## 2024-12-19 NOTE — NURSING NOTE
Chief Complaint   Patient presents with    RECHECK     2 month follow-up, R 3rd toe wound.       There were no vitals filed for this visit.    There is no height or weight on file to calculate BMI. Per provider.    No pain, 0/10.    Nick Álvarez, EMT

## 2024-12-19 NOTE — LETTER
2024       RE: Glenny Campos  [address]  [place] [address]     Dear Colleague,    Thank you for referring your patient, Glenny Campos, to the [facility] [place] [facility]. Please see a copy of my visit note below.    Chief Complaint   Patient presents with     RECHECK     2 month follow-up, R 3rd toe wound.           HPI: Glenny is a 59 year old female who presents today for further evaluation of right toe ulcer.  She got diabetic shoes a few weeks ago and has rubbing on the right 5th toe, which has opened a new ulcer. She is in diabetic shoes. She self avulsed the right 2nd toenail.      Interval hx: She is wearing regular shoes. She is not wearing diabetic shoes.  Changing the dressing as directed.     Review of Systems: No n/v/d/f/c/ns/sob/cp    PMH:   Past Medical History:   Diagnosis Date     Diabetic foot ulcer (H)      Osteomyelitis (H)      Type 2 diabetes mellitus with diabetic polyneuropathy (H)        PSxH:   Past Surgical History:   Procedure Laterality Date     Amputation hallux Left      APPENDECTOMY  1992      SECTION      ,        Allergies: Piperacillin-tazobactam in dex, Cephalosporins, Statins, Cephalexin, Clindamycin, and Penicillins    SH:   Social History     Socioeconomic History     Marital status:      Spouse name: Not on file     Number of children: Not on file     Years of education: Not on file     Highest education level: Not on file   Occupational History     Not on file   Tobacco Use     Smoking status: Never     Smokeless tobacco: Never   Vaping Use     Vaping status: Never Used   Substance and Sexual Activity     Alcohol use: Not on file     Drug use: Not on file     Sexual activity: Not on file   Other Topics Concern     Not on file   Social History Narrative     Not on file     Social Drivers of Health     Financial Resource Strain: Not on file   Food Insecurity:  Not on file   Transportation Needs: Not on file   Physical Activity: Not on file   Stress: Not on file   Social Connections: Not on file   Interpersonal Safety: Not on file   Housing Stability: Not on file       FH:   Family History   Problem Relation Age of Onset     Colon Cancer Mother      Glaucoma Mother      Diabetes Type 2  Mother      Dementia Father      Heart Failure Maternal Grandmother      Cerebrovascular Disease Maternal Grandfather        Objective:            Hgb A1C  Order: [id number]  Component  Ref Range & Units 1 mo ago   Hemoglobin A1C (Rapid)  <=5.6 % 8.5 High    Performing Location [facility]   Estimated Average Glucose (Calc)  < 117 mg/dL 197   Comment: Estimated average glucose (eAG) converts A1c into glucose units (mg/dL) and estimates average glucose over the past approximately 3 months.  The eAG reference interval (<117 mg/dL) corresponds to an A1c of <5.7%.   Resulting Agency [facility]   Narrative  Performed by [facility]  For patients not previously diagnosed with diabetes:    DP and PT pulses 2/4. CRT 1 second. Diminished pedal hair.  Gross sensation is severely diminished.   Amp of the left hallux and partial 2nd digit.   ______      A wound is noted at right dorsal 5th  measuring 0.1cm x 0.1cm x 0.1cm.    Ross Classification: 2    Wound base: Red granulation    Edges: hyperkeratotic    Drainage: slight/serous    Odor: no    Undermining: no    Bone Exposure: No    Clinical Signs of Infection: No    After obtaining patient consent, the wound was irrigated with copious amounts of saline. A scalpel was then used to debride the wound into dermal tissue. The wound edges were debrided back to healthy, bleeding tissue. The wound base exhibited healthy bleeding. Given the patient's lack of sensation, no anesthesia was necessary for the procedure.          ____________  Wound #2        A wound is noted at right  medial 3rd digit measuring 1.5cm x 1cm x  0.1cm.    Ross Classification: 2    Wound base: Red/Granulation    Edges: hyperkeratotic    Drainage: slight/serous    Odor: no    Undermining: no    Bone Exposure: No    Clinical Signs of Infection: No      After obtaining patient consent, the wound was irrigated with copious amounts of saline. A tissue nipper was then used to debride the wound into dermal tissue. The wound edges were debrided back to healthy, bleeding tissue. The wound base exhibited healthy bleeding. Given the patient's lack of sensation, no anesthesia was necessary for the procedure.        Barriers to Healing: Wound in an area of pressure. Previous amputations.     Treatment Plan: Betadine and Primapore.     Pt Ability to Follow Plan: Moderate.       Assessment: Glenny is a 58 YO with right toe ulcerations and right 2nd toe  digital amputation.    Plan:  - Pt seen and evaluated.  - Start dressings as above.   - Wound debrided as described.   - See again in 2 weeks.              Again, thank you for allowing me to participate in the care of your patient.      Sincerely,    Tae Chavira DPM

## 2024-12-19 NOTE — PROGRESS NOTES
Chief Complaint   Patient presents with    RECHECK     2 month follow-up, R 3rd toe wound.           HPI: Glenny is a 59 year old female who presents today for further evaluation of right toe ulcer.  She got diabetic shoes a few weeks ago and has rubbing on the right 5th toe, which has opened a new ulcer. She is in diabetic shoes. She self avulsed the right 2nd toenail.      Interval hx: She is wearing regular shoes. She is not wearing diabetic shoes.  Changing the dressing as directed.     Review of Systems: No n/v/d/f/c/ns/sob/cp    PMH:   Past Medical History:   Diagnosis Date    Diabetic foot ulcer (H)     Osteomyelitis (H)     Type 2 diabetes mellitus with diabetic polyneuropathy (H)        PSxH:   Past Surgical History:   Procedure Laterality Date    Amputation hallux Left     APPENDECTOMY  1992     SECTION      ,        Allergies: Piperacillin-tazobactam in dex, Cephalosporins, Statins, Cephalexin, Clindamycin, and Penicillins    SH:   Social History     Socioeconomic History    Marital status:      Spouse name: Not on file    Number of children: Not on file    Years of education: Not on file    Highest education level: Not on file   Occupational History    Not on file   Tobacco Use    Smoking status: Never    Smokeless tobacco: Never   Vaping Use    Vaping status: Never Used   Substance and Sexual Activity    Alcohol use: Not on file    Drug use: Not on file    Sexual activity: Not on file   Other Topics Concern    Not on file   Social History Narrative    Not on file     Social Drivers of Health     Financial Resource Strain: Not on file   Food Insecurity: Not on file   Transportation Needs: Not on file   Physical Activity: Not on file   Stress: Not on file   Social Connections: Not on file   Interpersonal Safety: Not on file   Housing Stability: Not on file       FH:   Family History   Problem Relation Age of Onset    Colon Cancer Mother     Glaucoma Mother     Diabetes Type 2   Mother     Dementia Father     Heart Failure Maternal Grandmother     Cerebrovascular Disease Maternal Grandfather        Objective:            Hgb A1C  Order: [id number]  Component  Ref Range & Units 1 mo ago   Hemoglobin A1C (Rapid)  <=5.6 % 8.5 High    Performing Location [facility]   Estimated Average Glucose (Calc)  < 117 mg/dL 197   Comment: Estimated average glucose (eAG) converts A1c into glucose units (mg/dL) and estimates average glucose over the past approximately 3 months.  The eAG reference interval (<117 mg/dL) corresponds to an A1c of <5.7%.   Resulting Agency [email or web address]   Narrative  Performed by [email or web address] [employer]  For patients not previously diagnosed with diabetes:    DP and PT pulses 2/4. CRT 1 second. Diminished pedal hair.  Gross sensation is severely diminished.   Amp of the left hallux and partial 2nd digit.   ______      A wound is noted at right dorsal 5th  measuring 0.1cm x 0.1cm x 0.1cm.    Ross Classification: 2    Wound base: Red granulation    Edges: hyperkeratotic    Drainage: slight/serous    Odor: no    Undermining: no    Bone Exposure: No    Clinical Signs of Infection: No    After obtaining patient consent, the wound was irrigated with copious amounts of saline. A scalpel was then used to debride the wound into dermal tissue. The wound edges were debrided back to healthy, bleeding tissue. The wound base exhibited healthy bleeding. Given the patient's lack of sensation, no anesthesia was necessary for the procedure.          ____________  Wound #2        A wound is noted at right  medial 3rd digit measuring 1.5cm x 1cm x 0.1cm.    Ross Classification: 2    Wound base: Red/Granulation    Edges: hyperkeratotic    Drainage: slight/serous    Odor: no    Undermining: no    Bone Exposure: No    Clinical Signs of Infection: No      After obtaining patient consent, the wound was irrigated with copious amounts of saline. A tissue nipper was then used to debride the wound  into dermal tissue. The wound edges were debrided back to healthy, bleeding tissue. The wound base exhibited healthy bleeding. Given the patient's lack of sensation, no anesthesia was necessary for the procedure.        Barriers to Healing: Wound in an area of pressure. Previous amputations.     Treatment Plan: Betadine and Primapore.     Pt Ability to Follow Plan: Moderate.       Assessment: Glenny is a 58 YO with right toe ulcerations and right 2nd toe  digital amputation.    Plan:  - Pt seen and evaluated.  - Start dressings as above.   - Wound debrided as described.   - See again in 2 weeks.

## 2025-01-02 ENCOUNTER — OFFICE VISIT (OUTPATIENT)
Dept: WOUND CARE | Facility: CLINIC | Age: 60
End: 2025-01-02
Payer: COMMERCIAL

## 2025-01-02 DIAGNOSIS — M21.42 PES PLANUS OF BOTH FEET: ICD-10-CM

## 2025-01-02 DIAGNOSIS — E11.49 TYPE II OR UNSPECIFIED TYPE DIABETES MELLITUS WITH NEUROLOGICAL MANIFESTATIONS, NOT STATED AS UNCONTROLLED(250.60) (H): ICD-10-CM

## 2025-01-02 DIAGNOSIS — L03.031 CELLULITIS OF TOE OF RIGHT FOOT: Primary | ICD-10-CM

## 2025-01-02 DIAGNOSIS — M21.41 PES PLANUS OF BOTH FEET: ICD-10-CM

## 2025-01-02 RX ORDER — DOXYCYCLINE 100 MG/1
100 CAPSULE ORAL 2 TIMES DAILY
Qty: 10 CAPSULE | Refills: 0 | Status: SHIPPED | OUTPATIENT
Start: 2025-01-02

## 2025-01-02 RX ORDER — DOXYCYCLINE 100 MG/1
100 CAPSULE ORAL 2 TIMES DAILY
Qty: 10 CAPSULE | Refills: 0 | Status: SHIPPED | OUTPATIENT
Start: 2025-01-02 | End: 2025-01-02

## 2025-01-02 ASSESSMENT — PAIN SCALES - GENERAL: PAINLEVEL_OUTOF10: MILD PAIN (3)

## 2025-01-02 NOTE — NURSING NOTE
Chief Complaint   Patient presents with    RECHECK     2 week follow-up, R 3rd toe wound.       There were no vitals filed for this visit.    There is no height or weight on file to calculate BMI. Per provider.    Patient reports mild R foot pain (3/10).    Nick Álvarez, EMT

## 2025-01-02 NOTE — LETTER
2025       RE: Glenny Campos  [address]  [facility] [address]     Dear Colleague,    Thank you for referring your patient, Glenny Campos, to the [facility] WOUND CLINIC [place] at [facility]. Please see a copy of my visit note below.    Chief Complaint   Patient presents with     RECHECK     2 week follow-up, R 3rd toe wound.           HPI: Glenny is a 59 year old female who presents today for further evaluation of right toe ulcer.  She got diabetic shoes a few weeks ago and has rubbing on the right 5th toe, which has opened a new ulcer. She is in diabetic shoes. She self avulsed the right 2nd toenail.      Interval hx: She is wearing regular shoes. She is not wearing diabetic shoes. She picked the right 2nd toenail off and now has a wound on the dorsal toe and redness.     Review of Systems: No n/v/d/f/c/ns/sob/cp    PMH:   Past Medical History:   Diagnosis Date     Diabetic foot ulcer (H)      Osteomyelitis (H)      Type 2 diabetes mellitus with diabetic polyneuropathy (H)        PSxH:   Past Surgical History:   Procedure Laterality Date     Amputation hallux Left      APPENDECTOMY  1992      SECTION      ,        Allergies: Piperacillin-tazobactam in dex, Cephalosporins, Statins, Cephalexin, Clindamycin, and Penicillins    SH:   Social History     Socioeconomic History     Marital status:      Spouse name: Not on file     Number of children: Not on file     Years of education: Not on file     Highest education level: Not on file   Occupational History     Not on file   Tobacco Use     Smoking status: Never     Smokeless tobacco: Never   Vaping Use     Vaping status: Never Used   Substance and Sexual Activity     Alcohol use: Not on file     Drug use: Not on file     Sexual activity: Not on file   Other Topics Concern     Not on file   Social History Narrative     Not on file     Social Drivers of Health     Financial  Resource Strain: Not on file   Food Insecurity: Not on file   Transportation Needs: Not on file   Physical Activity: Not on file   Stress: Not on file   Social Connections: Not on file   Interpersonal Safety: Not on file   Housing Stability: Not on file       FH:   Family History   Problem Relation Age of Onset     Colon Cancer Mother      Glaucoma Mother      Diabetes Type 2  Mother      Dementia Father      Heart Failure Maternal Grandmother      Cerebrovascular Disease Maternal Grandfather        Objective:            Hgb A1C  Order: [id number]  Component  Ref Range & Units 1 mo ago   Hemoglobin A1C (Rapid)  <=5.6 % 8.5 High    Performing Location [facility]   Estimated Average Glucose (Calc)  < 117 mg/dL 197   Comment: Estimated average glucose (eAG) converts A1c into glucose units (mg/dL) and estimates average glucose over the past approximately 3 months.  The eAG reference interval (<117 mg/dL) corresponds to an A1c of <5.7%.   Resulting Agency [facility]   Narrative  Performed by [facility]  For patients not previously diagnosed with diabetes:    DP and PT pulses 2/4. CRT 1 second. Diminished pedal hair.  Gross sensation is severely diminished.   Amp of the left hallux and partial 2nd digit.   ______      A wound is noted at right dorsal 5th  measuring 0.1cm x 0.1cm x 0.1cm.    Ross Classification: 2    Wound base: Red granulation    Edges: hyperkeratotic    Drainage: slight/serous    Odor: no    Undermining: no    Bone Exposure: No    Clinical Signs of Infection: No    After obtaining patient consent, the wound was irrigated with copious amounts of saline. A scalpel was then used to debride the wound into dermal tissue. The wound edges were debrided back to healthy, bleeding tissue. The wound base exhibited healthy bleeding. Given the patient's lack of sensation, no anesthesia was necessary for the procedure.          ____________  Wound #2        A wound is noted at right  medial  3rd digit measuring 1.5cm x 1cm x 0.1cm.    Ross Classification: 2    Wound base: Red/Granulation    Edges: hyperkeratotic    Drainage: slight/serous    Odor: no    Undermining: no    Bone Exposure: No    Clinical Signs of Infection: No  _____________________  Wound #2    A wound is noted at right  dorsal 2nd digit measuring 1cm x 1.2cm x 0.1cm.    Ross Classification: 2    Wound base: Red/Granulation    Edges: cellulitic    Drainage: small/serous    Odor: no    Undermining: no    Bone Exposure: No    Clinical Signs of Infection: Yes: cellulitis    After obtaining patient consent, the wound was irrigated with copious amounts of saline.           Barriers to Healing: Wound in an area of pressure. Previous amputations.     Treatment Plan: Iodofoam and Medipore tape.     Pt Ability to Follow Plan: Moderate.       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    Plan:  - Pt seen and evaluated.  - Start dressings as above.   - Rx sent for PO doxycycline for the cellulitis.   - See again in 1 week.              Again, thank you for allowing me to participate in the care of your patient.      Sincerely,    Tae Chavira DPM

## 2025-01-02 NOTE — PROGRESS NOTES
Chief Complaint   Patient presents with    RECHECK     2 week follow-up, R 3rd toe wound.           HPI: Glenny is a 59 year old female who presents today for further evaluation of right toe ulcer.  She got diabetic shoes a few weeks ago and has rubbing on the right 5th toe, which has opened a new ulcer. She is in diabetic shoes. She self avulsed the right 2nd toenail.      Interval hx: She is wearing regular shoes. She is not wearing diabetic shoes. She picked the right 2nd toenail off and now has a wound on the dorsal toe and redness.     Review of Systems: No n/v/d/f/c/ns/sob/cp    PMH:   Past Medical History:   Diagnosis Date    Diabetic foot ulcer (H)     Osteomyelitis (H)     Type 2 diabetes mellitus with diabetic polyneuropathy (H)        PSxH:   Past Surgical History:   Procedure Laterality Date    Amputation hallux Left     APPENDECTOMY  1992     SECTION      ,        Allergies: Piperacillin-tazobactam in dex, Cephalosporins, Statins, Cephalexin, Clindamycin, and Penicillins    SH:   Social History     Socioeconomic History    Marital status:      Spouse name: Not on file    Number of children: Not on file    Years of education: Not on file    Highest education level: Not on file   Occupational History    Not on file   Tobacco Use    Smoking status: Never    Smokeless tobacco: Never   Vaping Use    Vaping status: Never Used   Substance and Sexual Activity    Alcohol use: Not on file    Drug use: Not on file    Sexual activity: Not on file   Other Topics Concern    Not on file   Social History Narrative    Not on file     Social Drivers of Health     Financial Resource Strain: Not on file   Food Insecurity: Not on file   Transportation Needs: Not on file   Physical Activity: Not on file   Stress: Not on file   Social Connections: Not on file   Interpersonal Safety: Not on file   Housing Stability: Not on file       FH:   Family History   Problem Relation Age of Onset    Colon Cancer  Mother     Glaucoma Mother     Diabetes Type 2  Mother     Dementia Father     Heart Failure Maternal Grandmother     Cerebrovascular Disease Maternal Grandfather        Objective:            Hgb A1C  Order: [id number]  Component  Ref Range & Units 1 mo ago   Hemoglobin A1C (Rapid)  <=5.6 % 8.5 High    Performing Location [facility]   Estimated Average Glucose (Calc)  < 117 mg/dL 197   Comment: Estimated average glucose (eAG) converts A1c into glucose units (mg/dL) and estimates average glucose over the past approximately 3 months.  The eAG reference interval (<117 mg/dL) corresponds to an A1c of <5.7%.   Resulting Agency [facility]   Narrative  Performed by [facility]  For patients not previously diagnosed with diabetes:    DP and PT pulses 2/4. CRT 1 second. Diminished pedal hair.  Gross sensation is severely diminished.   Amp of the left hallux and partial 2nd digit.   ______      A wound is noted at right dorsal 5th  measuring 0.1cm x 0.1cm x 0.1cm.    Ross Classification: 2    Wound base: Red granulation    Edges: hyperkeratotic    Drainage: slight/serous    Odor: no    Undermining: no    Bone Exposure: No    Clinical Signs of Infection: No    After obtaining patient consent, the wound was irrigated with copious amounts of saline. A scalpel was then used to debride the wound into dermal tissue. The wound edges were debrided back to healthy, bleeding tissue. The wound base exhibited healthy bleeding. Given the patient's lack of sensation, no anesthesia was necessary for the procedure.          ____________  Wound #2        A wound is noted at right  medial 3rd digit measuring 1.5cm x 1cm x 0.1cm.    Ross Classification: 2    Wound base: Red/Granulation    Edges: hyperkeratotic    Drainage: slight/serous    Odor: no    Undermining: no    Bone Exposure: No    Clinical Signs of Infection: No  _____________________  Wound #2    A wound is noted at right  dorsal 2nd digit measuring 1cm x  1.2cm x 0.1cm.    Ross Classification: 2    Wound base: Red/Granulation    Edges: cellulitic    Drainage: small/serous    Odor: no    Undermining: no    Bone Exposure: No    Clinical Signs of Infection: Yes: cellulitis    After obtaining patient consent, the wound was irrigated with copious amounts of saline.           Barriers to Healing: Wound in an area of pressure. Previous amputations.     Treatment Plan: Iodofoam and Medipore tape.     Pt Ability to Follow Plan: Moderate.       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    Plan:  - Pt seen and evaluated.  - Start dressings as above.   - Rx sent for PO doxycycline for the cellulitis.   - See again in 1 week.

## 2025-01-16 ENCOUNTER — OFFICE VISIT (OUTPATIENT)
Dept: WOUND CARE | Facility: CLINIC | Age: 60
End: 2025-01-16
Payer: COMMERCIAL

## 2025-01-16 DIAGNOSIS — E11.49 TYPE II OR UNSPECIFIED TYPE DIABETES MELLITUS WITH NEUROLOGICAL MANIFESTATIONS, NOT STATED AS UNCONTROLLED(250.60) (H): Primary | ICD-10-CM

## 2025-01-16 DIAGNOSIS — M21.42 PES PLANUS OF BOTH FEET: ICD-10-CM

## 2025-01-16 DIAGNOSIS — M21.41 PES PLANUS OF BOTH FEET: ICD-10-CM

## 2025-01-16 ASSESSMENT — PAIN SCALES - GENERAL: PAINLEVEL_OUTOF10: NO PAIN (0)

## 2025-01-16 NOTE — LETTER
2025       RE: Glenny Campos  [address]  [facility] [address]     Dear Colleague,    Thank you for referring your patient, Glenny Campos, to the [facility] WOUND CLINIC [place] at [facility]. Please see a copy of my visit note below.    Chief Complaint   Patient presents with     RECHECK     R 3rd toe wound, 2 week follow-up.           HPI: Glenny is a 59 year old female who presents today for further evaluation of right toe ulcer.  She got diabetic shoes a few weeks ago and has rubbing on the right 5th toe, which has opened a new ulcer. She is in diabetic shoes. She self avulsed the right 2nd toenail.      Interval hx: She is wearing regular shoes. She is not wearing diabetic shoes. Wounds are healed.     Review of Systems: No n/v/d/f/c/ns/sob/cp    PMH:   Past Medical History:   Diagnosis Date     Diabetic foot ulcer (H)      Osteomyelitis (H)      Type 2 diabetes mellitus with diabetic polyneuropathy (H)        PSxH:   Past Surgical History:   Procedure Laterality Date     Amputation hallux Left      APPENDECTOMY  1992      SECTION      ,        Allergies: Piperacillin-tazobactam in dex, Cephalosporins, Statins, Cephalexin, Clindamycin, and Penicillins    SH:   Social History     Socioeconomic History     Marital status:      Spouse name: Not on file     Number of children: Not on file     Years of education: Not on file     Highest education level: Not on file   Occupational History     Not on file   Tobacco Use     Smoking status: Never     Smokeless tobacco: Never   Vaping Use     Vaping status: Never Used   Substance and Sexual Activity     Alcohol use: Not on file     Drug use: Not on file     Sexual activity: Not on file   Other Topics Concern     Not on file   Social History Narrative     Not on file     Social Drivers of Health     Financial Resource Strain: Not on file   Food Insecurity: Not on file    Transportation Needs: Not on file   Physical Activity: Not on file   Stress: Not on file   Social Connections: Not on file   Interpersonal Safety: Not on file   Housing Stability: Not on file       FH:   Family History   Problem Relation Age of Onset     Colon Cancer Mother      Glaucoma Mother      Diabetes Type 2  Mother      Dementia Father      Heart Failure Maternal Grandmother      Cerebrovascular Disease Maternal Grandfather        Objective:            Hgb A1C  Order: [id number]  Component  Ref Range & Units 1 mo ago   Hemoglobin A1C (Rapid)  <=5.6 % 8.5 High    Performing Location [facility]   Estimated Average Glucose (Calc)  < 117 mg/dL 197   Comment: Estimated average glucose (eAG) converts A1c into glucose units (mg/dL) and estimates average glucose over the past approximately 3 months.  The eAG reference interval (<117 mg/dL) corresponds to an A1c of <5.7%.   Resulting Agency [facility]   Narrative  Performed by [facility]  For patients not previously diagnosed with diabetes:    DP and PT pulses 2/4. CRT 1 second. Diminished pedal hair.  Gross sensation is severely diminished.   Amp of the left hallux and partial 2nd digit.   ______          ____________  Wound #2        A wound is noted at right  medial 3rd digit is healed.   _____________________  Wound #2    A wound is noted at right  dorsal 2nd digit is healed.           Assessment: Glenny is a 60 YO with right toe ulcerations and right 2nd toe  digital amputation with healed ulcers.     Plan:  - Pt seen and evaluated.  - Activity as tolerated,  - See again in 7 weeks.              Again, thank you for allowing me to participate in the care of your patient.      Sincerely,    Tae Chavira DPM

## 2025-01-16 NOTE — PROGRESS NOTES
Chief Complaint   Patient presents with    RECHECK     R 3rd toe wound, 2 week follow-up.           HPI: Glenny is a 59 year old female who presents today for further evaluation of right toe ulcer.  She got diabetic shoes a few weeks ago and has rubbing on the right 5th toe, which has opened a new ulcer. She is in diabetic shoes. She self avulsed the right 2nd toenail.      Interval hx: She is wearing regular shoes. She is not wearing diabetic shoes. Wounds are healed.     Review of Systems: No n/v/d/f/c/ns/sob/cp    PMH:   Past Medical History:   Diagnosis Date    Diabetic foot ulcer (H)     Osteomyelitis (H)     Type 2 diabetes mellitus with diabetic polyneuropathy (H)        PSxH:   Past Surgical History:   Procedure Laterality Date    Amputation hallux Left     APPENDECTOMY  1992     SECTION      ,        Allergies: Piperacillin-tazobactam in dex, Cephalosporins, Statins, Cephalexin, Clindamycin, and Penicillins    SH:   Social History     Socioeconomic History    Marital status:      Spouse name: Not on file    Number of children: Not on file    Years of education: Not on file    Highest education level: Not on file   Occupational History    Not on file   Tobacco Use    Smoking status: Never    Smokeless tobacco: Never   Vaping Use    Vaping status: Never Used   Substance and Sexual Activity    Alcohol use: Not on file    Drug use: Not on file    Sexual activity: Not on file   Other Topics Concern    Not on file   Social History Narrative    Not on file     Social Drivers of Health     Financial Resource Strain: Not on file   Food Insecurity: Not on file   Transportation Needs: Not on file   Physical Activity: Not on file   Stress: Not on file   Social Connections: Not on file   Interpersonal Safety: Not on file   Housing Stability: Not on file       FH:   Family History   Problem Relation Age of Onset    Colon Cancer Mother     Glaucoma Mother     Diabetes Type 2  Mother     Dementia  Father     Heart Failure Maternal Grandmother     Cerebrovascular Disease Maternal Grandfather        Objective:            Hgb A1C  Order: [id number]  Component  Ref Range & Units 1 mo ago   Hemoglobin A1C (Rapid)  <=5.6 % 8.5 High    Performing Location [facility]   Estimated Average Glucose (Calc)  < 117 mg/dL 197   Comment: Estimated average glucose (eAG) converts A1c into glucose units (mg/dL) and estimates average glucose over the past approximately 3 months.  The eAG reference interval (<117 mg/dL) corresponds to an A1c of <5.7%.   Resulting Agency [facility]   Narrative  Performed by [facility]  For patients not previously diagnosed with diabetes:    DP and PT pulses 2/4. CRT 1 second. Diminished pedal hair.  Gross sensation is severely diminished.   Amp of the left hallux and partial 2nd digit.   ______          ____________  Wound #2        A wound is noted at right  medial 3rd digit is healed.   _____________________  Wound #2    A wound is noted at right  dorsal 2nd digit is healed.           Assessment: Glenny is a 58 YO with right toe ulcerations and right 2nd toe  digital amputation with healed ulcers.     Plan:  - Pt seen and evaluated.  - Activity as tolerated,  - See again in 7 weeks.

## 2025-01-19 ENCOUNTER — HEALTH MAINTENANCE LETTER (OUTPATIENT)
Age: 60
End: 2025-01-19

## 2025-04-27 ENCOUNTER — HEALTH MAINTENANCE LETTER (OUTPATIENT)
Age: 60
End: 2025-04-27

## 2025-08-10 ENCOUNTER — HEALTH MAINTENANCE LETTER (OUTPATIENT)
Age: 60
End: 2025-08-10

## 2025-08-31 ENCOUNTER — HEALTH MAINTENANCE LETTER (OUTPATIENT)
Age: 60
End: 2025-08-31